# Patient Record
Sex: MALE | URBAN - METROPOLITAN AREA
[De-identification: names, ages, dates, MRNs, and addresses within clinical notes are randomized per-mention and may not be internally consistent; named-entity substitution may affect disease eponyms.]

---

## 2018-12-09 VITALS
OXYGEN SATURATION: 99 % | TEMPERATURE: 98 F | DIASTOLIC BLOOD PRESSURE: 87 MMHG | RESPIRATION RATE: 18 BRPM | SYSTOLIC BLOOD PRESSURE: 143 MMHG | HEART RATE: 96 BPM

## 2018-12-09 LAB
ANION GAP SERPL CALC-SCNC: 6 MMOL/L — LOW (ref 9–16)
APAP SERPL-MCNC: <2 UG/ML — LOW (ref 10–30)
APPEARANCE UR: CLEAR — SIGNIFICANT CHANGE UP
BASOPHILS NFR BLD AUTO: 0.3 % — SIGNIFICANT CHANGE UP (ref 0–2)
BILIRUB UR-MCNC: NEGATIVE — SIGNIFICANT CHANGE UP
BUN SERPL-MCNC: 19 MG/DL — SIGNIFICANT CHANGE UP (ref 7–23)
CALCIUM SERPL-MCNC: 9.5 MG/DL — SIGNIFICANT CHANGE UP (ref 8.5–10.5)
CHLORIDE SERPL-SCNC: 101 MMOL/L — SIGNIFICANT CHANGE UP (ref 96–108)
CO2 SERPL-SCNC: 32 MMOL/L — HIGH (ref 22–31)
COLOR SPEC: YELLOW — SIGNIFICANT CHANGE UP
CREAT SERPL-MCNC: 0.99 MG/DL — SIGNIFICANT CHANGE UP (ref 0.5–1.3)
DIFF PNL FLD: NEGATIVE — SIGNIFICANT CHANGE UP
EOSINOPHIL NFR BLD AUTO: 0.3 % — SIGNIFICANT CHANGE UP (ref 0–6)
ETHANOL SERPL-MCNC: <3 MG/DL — SIGNIFICANT CHANGE UP
GLUCOSE SERPL-MCNC: 110 MG/DL — HIGH (ref 70–99)
GLUCOSE UR QL: NEGATIVE — SIGNIFICANT CHANGE UP
HCT VFR BLD CALC: 40.4 % — SIGNIFICANT CHANGE UP (ref 39–50)
HGB BLD-MCNC: 13.6 G/DL — SIGNIFICANT CHANGE UP (ref 13–17)
IMM GRANULOCYTES NFR BLD AUTO: 0.2 % — SIGNIFICANT CHANGE UP (ref 0–1.5)
KETONES UR-MCNC: NEGATIVE — SIGNIFICANT CHANGE UP
LEUKOCYTE ESTERASE UR-ACNC: NEGATIVE — SIGNIFICANT CHANGE UP
LYMPHOCYTES # BLD AUTO: 20.4 % — SIGNIFICANT CHANGE UP (ref 13–44)
MCHC RBC-ENTMCNC: 27.6 PG — SIGNIFICANT CHANGE UP (ref 27–34)
MCHC RBC-ENTMCNC: 33.7 G/DL — SIGNIFICANT CHANGE UP (ref 32–36)
MCV RBC AUTO: 81.9 FL — SIGNIFICANT CHANGE UP (ref 80–100)
MONOCYTES NFR BLD AUTO: 6.1 % — SIGNIFICANT CHANGE UP (ref 2–14)
NEUTROPHILS NFR BLD AUTO: 72.7 % — SIGNIFICANT CHANGE UP (ref 43–77)
NITRITE UR-MCNC: NEGATIVE — SIGNIFICANT CHANGE UP
PCP SPEC-MCNC: SIGNIFICANT CHANGE UP
PH UR: 6 — SIGNIFICANT CHANGE UP (ref 5–8)
PLATELET # BLD AUTO: 326 K/UL — SIGNIFICANT CHANGE UP (ref 150–400)
POTASSIUM SERPL-MCNC: 3.6 MMOL/L — SIGNIFICANT CHANGE UP (ref 3.5–5.3)
POTASSIUM SERPL-SCNC: 3.6 MMOL/L — SIGNIFICANT CHANGE UP (ref 3.5–5.3)
PROT UR-MCNC: NEGATIVE MG/DL — SIGNIFICANT CHANGE UP
RBC # BLD: 4.93 M/UL — SIGNIFICANT CHANGE UP (ref 4.2–5.8)
RBC # FLD: 13.6 % — SIGNIFICANT CHANGE UP (ref 10.3–14.5)
SALICYLATES SERPL-MCNC: <0.2 MG/DL — LOW (ref 2.8–20)
SODIUM SERPL-SCNC: 139 MMOL/L — SIGNIFICANT CHANGE UP (ref 132–145)
SP GR SPEC: >=1.03 — SIGNIFICANT CHANGE UP (ref 1–1.03)
TSH SERPL-MCNC: 1.51 UIU/ML — SIGNIFICANT CHANGE UP (ref 0.36–3.74)
UROBILINOGEN FLD QL: 0.2 E.U./DL — SIGNIFICANT CHANGE UP
WBC # BLD: 9.6 K/UL — SIGNIFICANT CHANGE UP (ref 3.8–10.5)
WBC # FLD AUTO: 9.6 K/UL — SIGNIFICANT CHANGE UP (ref 3.8–10.5)

## 2018-12-09 PROCEDURE — 90792 PSYCH DIAG EVAL W/MED SRVCS: CPT | Mod: GT

## 2018-12-09 PROCEDURE — 71045 X-RAY EXAM CHEST 1 VIEW: CPT | Mod: 26

## 2018-12-09 NOTE — ED BEHAVIORAL HEALTH ASSESSMENT NOTE - SUBSTANCE ISSUES AND PLAN (INCLUDE STANDING AND PRN MEDICATION)
cannabis use, no other recent use disorders identified, denies recent use of benzodiazepines or alcohol
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
## 2018-12-09 NOTE — ED PROVIDER NOTE - MEDICAL DECISION MAKING DETAILS
Pt with psych history, recently discharged from [place] this morning coming in with persistent SI with plan. VS stable. Will do 1:1 psych work- up and re-evaluate. Pt with psych history, recently discharged from [place] this morning coming in with persistent SI with plan. VS stable. Will do 1:1 psych work- up and re-evaluate. see progress note

## 2018-12-09 NOTE — ED BEHAVIORAL HEALTH ASSESSMENT NOTE - PSYCHIATRIC ISSUES AND PLAN (INCLUDE STANDING AND PRN MEDICATION)
no PRN medications recommended at this time as patient's symptomatic presentation is inconsistent with report

## 2018-12-09 NOTE — ED PROVIDER NOTE - DIAGNOSTIC INTERPRETATION
Interpreted by nori DURAND chest  x-ray, 2  views  Lungs clear, heart shadow normal, bony structures normal, no free air under diaphragm, no PTX
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
## 2018-12-09 NOTE — ED BEHAVIORAL HEALTH ASSESSMENT NOTE - DIFFERENTIAL
adjustment disorder with disturbance of anxiety  cannabis use disorder adjustment disorder with disturbance of anxiety  cannabis use disorder  antisocial pers d/o
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
## 2018-12-09 NOTE — ED PROVIDER NOTE - ATTENDING CONTRIBUTION TO CARE
47 yo M hx of schizophrenia not compliant with medications, s/p recent discharge from inpt psych at [facility] presenting to ED for eval of SI, feels like jumping in front of train.  No drug use.  Denies attempt.  Pt aox3, VSS, looks well, stable mood, nl exam otherwise.  Plan med clearance, psych consult and assess need for inpt care.  Pt is voluntarily here.
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
## 2018-12-09 NOTE — ED BEHAVIORAL HEALTH ASSESSMENT NOTE - DESCRIPTION (FIRST USE, LAST USE, QUANTITY, FREQUENCY, DURATION)
per HPI maintained on suboxone high risk prescribing, likely history of abusing high risk prescribing pattern, stimulants
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
## 2018-12-09 NOTE — ED PROVIDER NOTE - PROGRESS NOTE DETAILS
note: pt. witness by staff/myself  attempting to hang himself with sheets. pt. moved to different room, discussed psych. will admit.

## 2018-12-09 NOTE — ED BEHAVIORAL HEALTH ASSESSMENT NOTE - RISK ASSESSMENT
Patient has several risk factors which are nonmodifiable by inpatient psychiatric admission such as male gender, single relationship status, unemployed, dependence social welfare for support, chronic substance use, and likely underlying anxiety or personality disorders. He presents reporting some psychiatric symptoms such as depressed mood, hallucinations and paranoia which are not consistent with his clinical presentation and arouse suspicion for malingering, given his  complaint that he is 'done with' the shelter system.

## 2018-12-10 ENCOUNTER — INPATIENT (INPATIENT)
Facility: HOSPITAL | Age: 48
LOS: 100 days | Discharge: NURSING, REHAB OR LONG-TERM CARE | DRG: 885 | End: 2019-03-21
Attending: PSYCHIATRY & NEUROLOGY | Admitting: PSYCHIATRY & NEUROLOGY
Payer: MEDICAID

## 2018-12-10 DIAGNOSIS — F13.20 SEDATIVE, HYPNOTIC OR ANXIOLYTIC DEPENDENCE, UNCOMPLICATED: ICD-10-CM

## 2018-12-10 DIAGNOSIS — F25.9 SCHIZOAFFECTIVE DISORDER, UNSPECIFIED: ICD-10-CM

## 2018-12-10 DIAGNOSIS — F32.89 OTHER SPECIFIED DEPRESSIVE EPISODES: ICD-10-CM

## 2018-12-10 DIAGNOSIS — T14.91XA SUICIDE ATTEMPT, INITIAL ENCOUNTER: ICD-10-CM

## 2018-12-10 DIAGNOSIS — F11.229 OPIOID DEPENDENCE WITH INTOXICATION, UNSPECIFIED: ICD-10-CM

## 2018-12-10 DIAGNOSIS — F12.10 CANNABIS ABUSE, UNCOMPLICATED: ICD-10-CM

## 2018-12-10 DIAGNOSIS — F19.10 OTHER PSYCHOACTIVE SUBSTANCE ABUSE, UNCOMPLICATED: ICD-10-CM

## 2018-12-10 DIAGNOSIS — R45.851 SUICIDAL IDEATIONS: ICD-10-CM

## 2018-12-10 DIAGNOSIS — F43.22 ADJUSTMENT DISORDER WITH ANXIETY: ICD-10-CM

## 2018-12-10 DIAGNOSIS — F15.90 OTHER STIMULANT USE, UNSPECIFIED, UNCOMPLICATED: ICD-10-CM

## 2018-12-10 LAB
CHOLEST SERPL-MCNC: 182 MG/DL — SIGNIFICANT CHANGE UP (ref 10–199)
HDLC SERPL-MCNC: 94 MG/DL — SIGNIFICANT CHANGE UP
LIPID PNL WITH DIRECT LDL SERPL: 77 MG/DL — SIGNIFICANT CHANGE UP
TOTAL CHOLESTEROL/HDL RATIO MEASUREMENT: 1.9 RATIO — LOW (ref 3.4–9.6)
TRIGL SERPL-MCNC: 57 MG/DL — SIGNIFICANT CHANGE UP (ref 10–149)

## 2018-12-10 PROCEDURE — 99233 SBSQ HOSP IP/OBS HIGH 50: CPT

## 2018-12-10 PROCEDURE — 71046 X-RAY EXAM CHEST 2 VIEWS: CPT | Mod: 26

## 2018-12-10 PROCEDURE — 99285 EMERGENCY DEPT VISIT HI MDM: CPT | Mod: 25

## 2018-12-10 PROCEDURE — 93010 ELECTROCARDIOGRAM REPORT: CPT

## 2018-12-10 RX ORDER — METHADONE HYDROCHLORIDE 40 MG/1
10 TABLET ORAL DAILY
Qty: 0 | Refills: 0 | Status: DISCONTINUED | OUTPATIENT
Start: 2018-12-10 | End: 2018-12-14

## 2018-12-10 RX ORDER — QUETIAPINE FUMARATE 200 MG/1
100 TABLET, FILM COATED ORAL AT BEDTIME
Qty: 0 | Refills: 0 | Status: DISCONTINUED | OUTPATIENT
Start: 2018-12-10 | End: 2018-12-11

## 2018-12-10 RX ORDER — HYDROXYZINE HCL 10 MG
50 TABLET ORAL AT BEDTIME
Qty: 0 | Refills: 0 | Status: DISCONTINUED | OUTPATIENT
Start: 2018-12-10 | End: 2018-12-13

## 2018-12-10 RX ORDER — DIPHENHYDRAMINE HCL 50 MG
50 CAPSULE ORAL EVERY 6 HOURS
Qty: 0 | Refills: 0 | Status: DISCONTINUED | OUTPATIENT
Start: 2018-12-10 | End: 2019-01-10

## 2018-12-10 RX ORDER — HALOPERIDOL DECANOATE 100 MG/ML
5 INJECTION INTRAMUSCULAR EVERY 6 HOURS
Qty: 0 | Refills: 0 | Status: DISCONTINUED | OUTPATIENT
Start: 2018-12-10 | End: 2019-01-11

## 2018-12-10 RX ADMIN — METHADONE HYDROCHLORIDE 10 MILLIGRAM(S): 40 TABLET ORAL at 15:13

## 2018-12-10 RX ADMIN — Medication 50 MILLIGRAM(S): at 03:37

## 2018-12-10 RX ADMIN — QUETIAPINE FUMARATE 100 MILLIGRAM(S): 200 TABLET, FILM COATED ORAL at 21:37
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
## 2018-12-10 NOTE — BEHAVIORAL HEALTH ASSESSMENT NOTE - NSBHCHARTREVIEWVS_PSY_A_CORE FT
Vital Signs Last 24 Hrs  T(C): 37.4 (10 Dec 2018 09:46), Max: 37.4 (10 Dec 2018 09:46)  T(F): 99.3 (10 Dec 2018 09:46), Max: 99.3 (10 Dec 2018 09:46)  HR: 76 (10 Dec 2018 09:46) (62 - 96)  BP: 144/92 (10 Dec 2018 09:46) (120/76 - 146/90)  BP(mean): --  RR: 20 (10 Dec 2018 09:46) (16 - 20)  SpO2: 100% (10 Dec 2018 01:25) (97% - 100%)

## 2018-12-10 NOTE — BEHAVIORAL HEALTH ASSESSMENT NOTE - RISK ASSESSMENT
Pt is high risk given multiple prior SA and Hx of substance abuse   Static: male gender, single relationship status, unemployed, dependence social welfare for support, substance use, underlying anxiety or personality disorder, Undomiciled, FmHx of substance abuse and SA (sister)   Modifiable: Substance use, non-compliance with meds, SI
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
## 2018-12-10 NOTE — BEHAVIORAL HEALTH ASSESSMENT NOTE - NSBHADMITIPSTRENGTH_PSY_A_CORE
Motivated and ready for change/Cooperative with treatment/In good physical health/Good impulse control/Intelligent/Able to set and pursue goals/Able to exercise self-direction

## 2018-12-10 NOTE — BEHAVIORAL HEALTH ASSESSMENT NOTE - NSBHSUICRISKFACTOR_PSY_A_CORE
Perceived burden on family and others/Global insomnia/History of abuse/trauma/Substance abuse/dependence/Chronic pain or acute medical issue/Hopelessness/Command hallucinations to hurt self/Family history of suicide

## 2018-12-10 NOTE — BEHAVIORAL HEALTH ASSESSMENT NOTE - NSBHVIOLRISKFACTORS_PSY_A_CORE
History of being victimized/traumatized/History of violation of a legal mandate (e.g., parole, probation, AOT)/Substance abuse

## 2018-12-11 PROCEDURE — 99232 SBSQ HOSP IP/OBS MODERATE 35: CPT

## 2018-12-11 RX ORDER — QUETIAPINE FUMARATE 200 MG/1
200 TABLET, FILM COATED ORAL AT BEDTIME
Qty: 0 | Refills: 0 | Status: DISCONTINUED | OUTPATIENT
Start: 2018-12-12 | End: 2018-12-13

## 2018-12-11 RX ADMIN — Medication 50 MILLIGRAM(S): at 21:22

## 2018-12-11 RX ADMIN — METHADONE HYDROCHLORIDE 10 MILLIGRAM(S): 40 TABLET ORAL at 09:38

## 2018-12-11 RX ADMIN — Medication 50 MILLIGRAM(S): at 03:50

## 2018-12-11 RX ADMIN — QUETIAPINE FUMARATE 100 MILLIGRAM(S): 200 TABLET, FILM COATED ORAL at 21:22

## 2018-12-11 RX ADMIN — Medication 50 MILLIGRAM(S): at 15:54

## 2018-12-11 NOTE — PROGRESS NOTE BEHAVIORAL HEALTH - NSBHCHARTREVIEWVS_PSY_A_CORE FT
Vital Signs Last 24 Hrs  T(C): 36.8 (11 Dec 2018 06:24), Max: 37.4 (10 Dec 2018 09:46)  T(F): 98.2 (11 Dec 2018 06:24), Max: 99.3 (10 Dec 2018 09:46)  HR: 58 (11 Dec 2018 06:24) (58 - 76)  BP: 127/84 (11 Dec 2018 06:24) (120/88 - 146/91)  BP(mean): --  RR: 16 (11 Dec 2018 06:24) (16 - 20)  SpO2: --

## 2018-12-11 NOTE — PROGRESS NOTE BEHAVIORAL HEALTH - NSBHFUPINTERVALHXFT_PSY_A_CORE
noted to be somewhat disorganized in groups but no behavioral issues; less anxious   MSE: attends group therapy.  in the dayroom sitting with others; clean.  denies AVH or s/h i/i/p.  speech soft; not spontaneous; unclear at times.   demonstrates normal gait;  no tremor or rigidity;   preoccupied and blocked; mood sad; affect sad and constricted; guarded and evasive; i&j: fair to poor; accepts treatment; however not reflective on sxs or situation.
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
## 2018-12-11 NOTE — PROGRESS NOTE BEHAVIORAL HEALTH - NSBHFUPINTERVALCCFT_PSY_A_CORE
willing  to take more Seroquel and not asking about Suboxone.  No sleep appetite or pain issues.   Noted to be odd in statements by other staff members. wants treatment in the hospital; no complaints of pain.
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
## 2018-12-12 DIAGNOSIS — F41.9 ANXIETY DISORDER, UNSPECIFIED: ICD-10-CM

## 2018-12-12 DIAGNOSIS — F11.20 OPIOID DEPENDENCE, UNCOMPLICATED: ICD-10-CM

## 2018-12-12 DIAGNOSIS — F22 DELUSIONAL DISORDERS: ICD-10-CM

## 2018-12-12 PROCEDURE — 99232 SBSQ HOSP IP/OBS MODERATE 35: CPT

## 2018-12-12 RX ADMIN — Medication 50 MILLIGRAM(S): at 00:04

## 2018-12-12 RX ADMIN — METHADONE HYDROCHLORIDE 10 MILLIGRAM(S): 40 TABLET ORAL at 10:00

## 2018-12-12 RX ADMIN — Medication 50 MILLIGRAM(S): at 09:59

## 2018-12-12 RX ADMIN — QUETIAPINE FUMARATE 200 MILLIGRAM(S): 200 TABLET, FILM COATED ORAL at 21:35

## 2018-12-12 RX ADMIN — Medication 50 MILLIGRAM(S): at 06:44

## 2018-12-12 NOTE — PROGRESS NOTE BEHAVIORAL HEALTH - NSBHFUPINTERVALHXFT_PSY_A_CORE
Pt approached in room and immediately appeared anxious and agitated. Pt also appears very fidgety and unable to sit still. Pt reports he woke up with a mild headache and requesting ibuprofen. Pt reports he called his  to set up a visit upon discharge and is worried that he will be charged with a violation because he has not maintained visits with him. Pt asks "why wont anyone tell me what's going on?", pt is clearly paranoid, also reports he feels that staff and other pts are "talking about me and laughing at me", also questions interviewer's sincerity during interview regarding pt's well-being. Reports feeling anxious and "my nerves are shot", requesting Atarax and reports he does not want Haldol. Pt denies SI, HI, AH, and VH at this time.
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
## 2018-12-12 NOTE — PROGRESS NOTE BEHAVIORAL HEALTH - NSBHCHARTREVIEWVS_PSY_A_CORE FT
Vital Signs Last 24 Hrs  T(C): 36.6 (12 Dec 2018 10:00), Max: 37.1 (12 Dec 2018 06:41)  T(F): 97.8 (12 Dec 2018 10:00), Max: 98.7 (12 Dec 2018 06:41)  HR: 65 (12 Dec 2018 10:00) (61 - 71)  BP: 142/84 (12 Dec 2018 10:00) (129/78 - 146/91)  BP(mean): --  RR: 18 (12 Dec 2018 10:00) (16 - 18)  SpO2: --

## 2018-12-13 DIAGNOSIS — F22 DELUSIONAL DISORDERS: ICD-10-CM

## 2018-12-13 PROCEDURE — 99232 SBSQ HOSP IP/OBS MODERATE 35: CPT

## 2018-12-13 RX ORDER — QUETIAPINE FUMARATE 200 MG/1
300 TABLET, FILM COATED ORAL AT BEDTIME
Qty: 0 | Refills: 0 | Status: DISCONTINUED | OUTPATIENT
Start: 2018-12-13 | End: 2018-12-18

## 2018-12-13 RX ORDER — NICOTINE POLACRILEX 2 MG
2 GUM BUCCAL
Qty: 0 | Refills: 0 | Status: DISCONTINUED | OUTPATIENT
Start: 2018-12-13 | End: 2019-01-14

## 2018-12-13 RX ORDER — HYDROXYZINE HCL 10 MG
50 TABLET ORAL EVERY 6 HOURS
Qty: 0 | Refills: 0 | Status: DISCONTINUED | OUTPATIENT
Start: 2018-12-13 | End: 2019-01-14

## 2018-12-13 RX ADMIN — QUETIAPINE FUMARATE 300 MILLIGRAM(S): 200 TABLET, FILM COATED ORAL at 21:25

## 2018-12-13 RX ADMIN — Medication 2 MILLIGRAM(S): at 12:35

## 2018-12-13 RX ADMIN — Medication 2 MILLIGRAM(S): at 21:25

## 2018-12-13 RX ADMIN — METHADONE HYDROCHLORIDE 10 MILLIGRAM(S): 40 TABLET ORAL at 10:31

## 2018-12-13 RX ADMIN — Medication 2 MILLIGRAM(S): at 18:37

## 2018-12-13 RX ADMIN — Medication 50 MILLIGRAM(S): at 12:35

## 2018-12-13 RX ADMIN — Medication 50 MILLIGRAM(S): at 18:37
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
## 2018-12-13 NOTE — PROGRESS NOTE BEHAVIORAL HEALTH - NSBHFUPINTERVALHXFT_PSY_A_CORE
Pt interviewed in room, Pt states "why do I feel like I can't trust anyone?" and then asks interviewer "are you related to me?". Pt still reports feeling anxious and that the Atarax is helping. Pt also reports that he feels his stay here is "a test to see if I'm violent" and when asked if pt has ever been violent, pt denies. Pt also requesting nicotine gum. Pt expresses interest in rehab and states "maybe when I leave, wherever I end up, maybe they will restart me on my suboxone, but I really would like to get off all medications". Also reports "Maybe I can change my life", and when interviewer expressed encouragement, pt questioned sincerity. Pt even expressed interest in possibility of returning to school to study linguistics. Pt reports "I cant blame anyone else for what's going on and I can't point my finger at anyone else, I have to fix myself". Additionally, pt reports believing in a "higher power" and "God who created everything".

## 2018-12-13 NOTE — PROGRESS NOTE BEHAVIORAL HEALTH - THOUGHT CONTENT
Other/Delusions/Preoccupations/Hopelessness Delusions/Ideas of reference/Hopelessness/Preoccupations/Other

## 2018-12-13 NOTE — PROGRESS NOTE BEHAVIORAL HEALTH - NSBHCHARTREVIEWVS_PSY_A_CORE FT
Vital Signs Last 24 Hrs  T(C): 37.6 (13 Dec 2018 06:06), Max: 37.6 (13 Dec 2018 06:06)  T(F): 99.6 (13 Dec 2018 06:06), Max: 99.6 (13 Dec 2018 06:06)  HR: 88 (13 Dec 2018 06:06) (65 - 88)  BP: 119/81 (13 Dec 2018 06:06) (119/81 - 142/84)  BP(mean): --  RR: 18 (13 Dec 2018 06:06) (18 - 18)  SpO2: --

## 2018-12-14 DIAGNOSIS — F17.210 NICOTINE DEPENDENCE, CIGARETTES, UNCOMPLICATED: ICD-10-CM

## 2018-12-14 PROCEDURE — 99232 SBSQ HOSP IP/OBS MODERATE 35: CPT

## 2018-12-14 RX ORDER — METHADONE HYDROCHLORIDE 40 MG/1
10 TABLET ORAL DAILY
Qty: 0 | Refills: 0 | Status: DISCONTINUED | OUTPATIENT
Start: 2018-12-14 | End: 2018-12-19

## 2018-12-14 RX ADMIN — Medication 50 MILLIGRAM(S): at 18:11

## 2018-12-14 RX ADMIN — Medication 2 MILLIGRAM(S): at 18:11

## 2018-12-14 RX ADMIN — QUETIAPINE FUMARATE 300 MILLIGRAM(S): 200 TABLET, FILM COATED ORAL at 22:09

## 2018-12-14 RX ADMIN — Medication 50 MILLIGRAM(S): at 10:11

## 2018-12-14 RX ADMIN — Medication 2 MILLIGRAM(S): at 12:02

## 2018-12-14 RX ADMIN — METHADONE HYDROCHLORIDE 10 MILLIGRAM(S): 40 TABLET ORAL at 10:09

## 2018-12-14 RX ADMIN — Medication 2 MILLIGRAM(S): at 22:08

## 2018-12-14 RX ADMIN — Medication 2 MILLIGRAM(S): at 10:11

## 2018-12-14 NOTE — PROGRESS NOTE BEHAVIORAL HEALTH - NSBHFUPINTERVALCCFT_PSY_A_CORE
"what is this ruse that's going on?" very paranoid response when asked about plans regarding leaving hospitaol and reports that interested in seeing ;  "I can'T make phone calls!"  "you psychiartis know minds!"  other staff also reports thought disorder and paranoid thinking.  "What about Suboxone"  (hadn't mentioned Suboxone for days and no return  call from clinic which patient provided phone number.  )

## 2018-12-14 NOTE — PROGRESS NOTE BEHAVIORAL HEALTH - NSBHCHARTREVIEWVS_PSY_A_CORE FT
Vital Signs Last 24 Hrs  T(C): 36.9 (14 Dec 2018 06:20), Max: 37.5 (13 Dec 2018 17:00)  T(F): 98.5 (14 Dec 2018 06:20), Max: 99.5 (13 Dec 2018 17:00)  HR: 75 (14 Dec 2018 06:20) (70 - 84)  BP: 123/80 (14 Dec 2018 06:20) (123/80 - 131/87)  BP(mean): --  RR: 18 (14 Dec 2018 06:20) (18 - 18)  SpO2: --
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
## 2018-12-14 NOTE — PROGRESS NOTE BEHAVIORAL HEALTH - NSBHFUPINTERVALHXFT_PSY_A_CORE
Pt interviewed in room. Pt reports he is "hanging in there" and then very quickly transitions into paranoid ideations, pt asks "what is this ruse that's going on here and why can't I get through to my ?". Pt tells interviewer that he remembers him but refused to elaborate how or from where. Pt also states "I don't want any problems" and "I won't act out so you guys can medicate me and kill me and transfer me to another units for medical observation". Pt repetitively responds with "It doesn't matter" when asked to elaborate about ideas of reference and paranoia. Pt also states "I don't know what they are trying to make me out to be" and reports hearing pts and staff making comments about him but refuses to elaborate or specify what he hears them saying. Pt reports he is sleeping well and his appetite is improving, also that nicotine gum is helping. Denies SI,HI,AH,VH at this time. MSE:  anxous spending much of time near the telephone ;  ; clean.  denies AVH or s/h i/i/p.  speech soft; not spontaneous; unclear at times.   demonstrates normal gait;  no tremor or rigidity;   preoccupied and blocked; mood sad; affect sad and constricted; guarded and evasive; i&j: fair to poor; accepts treatment; however not reflective on sxs or situation.

## 2018-12-15 RX ADMIN — Medication 50 MILLIGRAM(S): at 18:37

## 2018-12-15 RX ADMIN — METHADONE HYDROCHLORIDE 10 MILLIGRAM(S): 40 TABLET ORAL at 08:01

## 2018-12-15 RX ADMIN — Medication 2 MILLIGRAM(S): at 21:32

## 2018-12-15 RX ADMIN — Medication 2 MILLIGRAM(S): at 08:02

## 2018-12-15 RX ADMIN — Medication 50 MILLIGRAM(S): at 08:02

## 2018-12-15 RX ADMIN — QUETIAPINE FUMARATE 300 MILLIGRAM(S): 200 TABLET, FILM COATED ORAL at 21:33

## 2018-12-15 RX ADMIN — Medication 2 MILLIGRAM(S): at 10:30

## 2018-12-15 RX ADMIN — Medication 50 MILLIGRAM(S): at 03:45

## 2018-12-15 RX ADMIN — Medication 2 MILLIGRAM(S): at 18:38

## 2018-12-16 RX ADMIN — METHADONE HYDROCHLORIDE 10 MILLIGRAM(S): 40 TABLET ORAL at 09:32

## 2018-12-16 RX ADMIN — Medication 50 MILLIGRAM(S): at 16:01

## 2018-12-16 RX ADMIN — Medication 2 MILLIGRAM(S): at 09:32

## 2018-12-16 RX ADMIN — Medication 50 MILLIGRAM(S): at 08:06

## 2018-12-16 RX ADMIN — Medication 2 MILLIGRAM(S): at 16:01

## 2018-12-16 RX ADMIN — Medication 2 MILLIGRAM(S): at 21:06

## 2018-12-16 RX ADMIN — QUETIAPINE FUMARATE 300 MILLIGRAM(S): 200 TABLET, FILM COATED ORAL at 21:05

## 2018-12-17 PROCEDURE — 90853 GROUP PSYCHOTHERAPY: CPT

## 2018-12-17 PROCEDURE — 99232 SBSQ HOSP IP/OBS MODERATE 35: CPT

## 2018-12-17 RX ORDER — OLANZAPINE 15 MG/1
5 TABLET, FILM COATED ORAL ONCE
Qty: 0 | Refills: 0 | Status: COMPLETED | OUTPATIENT
Start: 2018-12-17 | End: 2018-12-17

## 2018-12-17 RX ADMIN — Medication 2 MILLIGRAM(S): at 10:24

## 2018-12-17 RX ADMIN — METHADONE HYDROCHLORIDE 10 MILLIGRAM(S): 40 TABLET ORAL at 10:21

## 2018-12-17 RX ADMIN — Medication 2 MILLIGRAM(S): at 12:45

## 2018-12-17 RX ADMIN — Medication 2 MILLIGRAM(S): at 22:10

## 2018-12-17 RX ADMIN — Medication 50 MILLIGRAM(S): at 12:44

## 2018-12-17 RX ADMIN — QUETIAPINE FUMARATE 300 MILLIGRAM(S): 200 TABLET, FILM COATED ORAL at 22:10

## 2018-12-17 RX ADMIN — Medication 50 MILLIGRAM(S): at 10:23

## 2018-12-17 NOTE — PROGRESS NOTE BEHAVIORAL HEALTH - NSBHFUPINTERVALCCFT_PSY_A_CORE
irene very paranoid  "What's wrong with you?"   wants to go to rehab.  Not able to tolerate structured interview.
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
## 2018-12-17 NOTE — CHART NOTE - NSCHARTNOTEFT_GEN_A_CORE
Group Name: Self-Other Group	  DOCUMENTATION OF PARTICIPATION AND RESPONSE OF INDIVIDUAL TO GROUP TREATMENT  Behavior in Group (check all that apply):  ? Showed insight             ? Active in discussion            ? Offered constructive input         ? No apparent interest  X Showed interest           X Quietly engaged                ? Supportive to others                  ? Appeared distracted  ? Showed leadership        Withdrawn                       ?Not supportive to others            ? Disruptive  Individual’s Mood:    ?Stable     XDepressed/Sad     ?Anxious     ?Angry    XOther Paranoid aboutbeing poisoned  Risk Assessment  XNone Reported or Observed   ? Danger to Self:  ? Ideation ? Plan  ? Intent  ? Attempt       ? Danger to Others:  ? Ideation ? Plan  ? Intent  ? Attempt   Describe in detail (cont. below as needed):      Goal(s)/Objective(s) Addressed  Positive and negative aspects of relationships    Intervention(s) / Method(s) Provided:   Self and Others Group  Relationship worksheet    Response to Intervention(s) and Progress Toward Goals and Objectives: PT engaged and discussed positive and negative aspects of relationships    Plan/Additional Information (any recommendations or determinations for continued or revised treatment): Continue attending group   Completed By - Print Staff Name/Credentials:  Lev Atwood, PhD / Staff Psychologist	  CPT Code 96577 	  Start Time 10:45	  Stop Time 11:30	  Duration in Minutes 45
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
## 2018-12-17 NOTE — PROGRESS NOTE BEHAVIORAL HEALTH - NSBHCHARTREVIEWVS_PSY_A_CORE FT
Vital Signs Last 24 Hrs  T(C): 37.1 (16 Dec 2018 17:00), Max: 37.2 (16 Dec 2018 09:39)  T(F): 98.7 (16 Dec 2018 17:00), Max: 99 (16 Dec 2018 09:39)  HR: 84 (16 Dec 2018 17:00) (80 - 84)  BP: 128/78 (16 Dec 2018 17:00) (116/79 - 128/78)  BP(mean): --  RR: 18 (16 Dec 2018 17:00) (18 - 20)  SpO2: --

## 2018-12-18 DIAGNOSIS — F20.0 PARANOID SCHIZOPHRENIA: ICD-10-CM

## 2018-12-18 PROCEDURE — 99232 SBSQ HOSP IP/OBS MODERATE 35: CPT

## 2018-12-18 RX ORDER — CLONAZEPAM 1 MG
1 TABLET ORAL EVERY 6 HOURS
Qty: 0 | Refills: 0 | Status: DISCONTINUED | OUTPATIENT
Start: 2018-12-18 | End: 2018-12-25

## 2018-12-18 RX ORDER — OLANZAPINE 15 MG/1
5 TABLET, FILM COATED ORAL
Qty: 0 | Refills: 0 | Status: DISCONTINUED | OUTPATIENT
Start: 2018-12-18 | End: 2018-12-21

## 2018-12-18 RX ORDER — QUETIAPINE FUMARATE 200 MG/1
100 TABLET, FILM COATED ORAL AT BEDTIME
Qty: 0 | Refills: 0 | Status: DISCONTINUED | OUTPATIENT
Start: 2018-12-18 | End: 2018-12-24

## 2018-12-18 RX ADMIN — OLANZAPINE 5 MILLIGRAM(S): 15 TABLET, FILM COATED ORAL at 21:36

## 2018-12-18 RX ADMIN — METHADONE HYDROCHLORIDE 10 MILLIGRAM(S): 40 TABLET ORAL at 09:41

## 2018-12-18 RX ADMIN — Medication 50 MILLIGRAM(S): at 09:41

## 2018-12-18 RX ADMIN — Medication 1 MILLIGRAM(S): at 13:26

## 2018-12-18 RX ADMIN — QUETIAPINE FUMARATE 100 MILLIGRAM(S): 200 TABLET, FILM COATED ORAL at 21:36

## 2018-12-18 RX ADMIN — Medication 50 MILLIGRAM(S): at 11:02

## 2018-12-18 RX ADMIN — Medication 2 MILLIGRAM(S): at 09:40

## 2018-12-18 RX ADMIN — Medication 2 MILLIGRAM(S): at 21:37
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
## 2018-12-18 NOTE — PROGRESS NOTE BEHAVIORAL HEALTH - THOUGHT CONTENT
Delusions/Other/Ideas of reference/Preoccupations/Hopelessness Delusions/Other/Ideas of reference/Preoccupations/Suicidality/Hopelessness

## 2018-12-18 NOTE — PROGRESS NOTE BEHAVIORAL HEALTH - OTHER
fidgety, lays down, sits up, stands up and then lays down again paranoid disorganized Aggressive paranoid, aggressive

## 2018-12-18 NOTE — PROGRESS NOTE BEHAVIORAL HEALTH - NSBHFUPINTERVALHXFT_PSY_A_CORE
Pt approached for interview in room. Reports he is not doing well. Pt appears severely paranoid and psychotic. Pt states "why is pressure being put on my family?, this is not a joke, I can tell by their voices it seems like they are being choked". Pt also reports he believes his phone calls are "being manipulated and recorded". Pt then states "why is the lady who works for my aunt here?" but refused to elaborate. When asked regarding SI pt replied "you think I don't have the balls to do it?" and then gestured in the bathroom and stated "I could just put this around my neck" and walked back to bed. Reports current meds are making his arm feel like it's burning.

## 2018-12-18 NOTE — PROGRESS NOTE BEHAVIORAL HEALTH - NSBHCHARTREVIEWVS_PSY_A_CORE FT
Vital Signs Last 24 Hrs  T(C): 36.6 (17 Dec 2018 17:00), Max: 37.4 (17 Dec 2018 09:27)  T(F): 97.9 (17 Dec 2018 17:00), Max: 99.3 (17 Dec 2018 09:27)  HR: 64 (17 Dec 2018 17:00) (64 - 84)  BP: 111/77 (17 Dec 2018 17:00) (111/77 - 115/81)  BP(mean): --  RR: 17 (17 Dec 2018 17:00) (17 - 20)  SpO2: --

## 2018-12-19 DIAGNOSIS — F60.2 ANTISOCIAL PERSONALITY DISORDER: ICD-10-CM

## 2018-12-19 PROCEDURE — 99232 SBSQ HOSP IP/OBS MODERATE 35: CPT

## 2018-12-19 RX ORDER — METHADONE HYDROCHLORIDE 40 MG/1
10 TABLET ORAL DAILY
Qty: 0 | Refills: 0 | Status: DISCONTINUED | OUTPATIENT
Start: 2018-12-19 | End: 2018-12-25

## 2018-12-19 RX ADMIN — Medication 50 MILLIGRAM(S): at 21:27

## 2018-12-19 RX ADMIN — Medication 2 MILLIGRAM(S): at 13:09

## 2018-12-19 RX ADMIN — Medication 2 MILLIGRAM(S): at 21:27

## 2018-12-19 RX ADMIN — METHADONE HYDROCHLORIDE 10 MILLIGRAM(S): 40 TABLET ORAL at 11:15

## 2018-12-19 RX ADMIN — OLANZAPINE 5 MILLIGRAM(S): 15 TABLET, FILM COATED ORAL at 21:26

## 2018-12-19 RX ADMIN — QUETIAPINE FUMARATE 100 MILLIGRAM(S): 200 TABLET, FILM COATED ORAL at 21:26

## 2018-12-19 RX ADMIN — OLANZAPINE 5 MILLIGRAM(S): 15 TABLET, FILM COATED ORAL at 11:13

## 2018-12-19 RX ADMIN — Medication 50 MILLIGRAM(S): at 13:09

## 2018-12-19 NOTE — PROGRESS NOTE BEHAVIORAL HEALTH - PROBLEM SELECTOR PLAN 1
Olanzapine 5mg BID  Quetiapine 100 HS  Klonopin 1mg PRN Q6 hours  Diphenhydramine 50mg PRN Q6 hours  Atarax 50mg PRN Q6 hours

## 2018-12-19 NOTE — PROGRESS NOTE BEHAVIORAL HEALTH - NSBHFUPINTERVALHXFT_PSY_A_CORE
Pt approached while in bed, under constant observation since making threat of suicide yesterday with plan and intent. Pt refuses to communicate much but appears less aggressive than previous encounter. Pt still endorses paranoid ideation, reports he believes that his phone calls are being monitored and manipulated. Also reports he cannot get in contact with his . Requesting to being transferred to "[facility]". Pt approached while in bed, under constant observation since making threat of suicide yesterday with plan and intent. Pt refuses to communicate much but appears less aggressive than previous encounter. Pt still endorses paranoid ideation, reports he believes that his phone calls are being monitored and manipulated. Also reports he cannot get in contact with his . Requesting to being transferred to "[facility]". Pt later observed sitting in glass next to phone and appears visibly agitated, when asked why, pt responded that his phone calls are not going through and refused to elaborate.
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
## 2018-12-19 NOTE — PROGRESS NOTE BEHAVIORAL HEALTH - NSBHCHARTREVIEWVS_PSY_A_CORE FT
Vital Signs Last 24 Hrs  T(C): 36.8 (18 Dec 2018 16:16), Max: 37.2 (18 Dec 2018 10:00)  T(F): 98.2 (18 Dec 2018 16:16), Max: 98.9 (18 Dec 2018 10:00)  HR: 71 (18 Dec 2018 16:16) (71 - 82)  BP: 128/80 (18 Dec 2018 16:16) (120/78 - 128/80)  BP(mean): --  RR: 20 (18 Dec 2018 16:16) (20 - 20)  SpO2: --

## 2018-12-19 NOTE — PROGRESS NOTE BEHAVIORAL HEALTH - PRN MEDS
Atarax 50mg for anxiety  Diphenhydramine 50mg for agitation  Klonopin 1mg for panic/anxiety  Nicotine Gum 2mg for nicotine dependence

## 2018-12-20 PROCEDURE — 99232 SBSQ HOSP IP/OBS MODERATE 35: CPT

## 2018-12-20 RX ADMIN — OLANZAPINE 5 MILLIGRAM(S): 15 TABLET, FILM COATED ORAL at 21:49

## 2018-12-20 RX ADMIN — OLANZAPINE 5 MILLIGRAM(S): 15 TABLET, FILM COATED ORAL at 10:59

## 2018-12-20 RX ADMIN — Medication 2 MILLIGRAM(S): at 11:02

## 2018-12-20 RX ADMIN — Medication 2 MILLIGRAM(S): at 21:49

## 2018-12-20 RX ADMIN — Medication 50 MILLIGRAM(S): at 21:49

## 2018-12-20 RX ADMIN — QUETIAPINE FUMARATE 100 MILLIGRAM(S): 200 TABLET, FILM COATED ORAL at 21:49

## 2018-12-20 RX ADMIN — METHADONE HYDROCHLORIDE 10 MILLIGRAM(S): 40 TABLET ORAL at 10:59

## 2018-12-20 RX ADMIN — Medication 1 MILLIGRAM(S): at 22:35
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
## 2018-12-20 NOTE — PROGRESS NOTE BEHAVIORAL HEALTH - NSBHFUPINTERVALHXFT_PSY_A_CORE
Pt approached in room, under constant observation, observed laying down in bed, awake. Pt reports feeling depressed, slept approximately 4-5 hours and appetite is ok. Pt also appears anxious and irritable, but much less aggressive and agitated than the previous 2 days. Pt continues to complain that he feels his arms are on fire. Pt also requesting transfer to [facility]. Pt still reporting paranoid ideation and reports it has not improved yet.

## 2018-12-20 NOTE — PROGRESS NOTE BEHAVIORAL HEALTH - PROBLEM SELECTOR PLAN 2
Zyprexa 5mg BID  Seroquel 100mg HS  Benadryl 50mg PRN Q6 hours  Klonopin 1mg PRN Q6 hours  Haldol 5mg PRN Q6 hours  Atarax 50mg PRN Q6 hours

## 2018-12-20 NOTE — PROGRESS NOTE BEHAVIORAL HEALTH - NSBHCHARTREVIEWVS_PSY_A_CORE FT
Vital Signs Last 24 Hrs  T(C): 36.9 (20 Dec 2018 08:40), Max: 36.9 (20 Dec 2018 08:40)  T(F): 98.4 (20 Dec 2018 08:40), Max: 98.4 (20 Dec 2018 08:40)  HR: 81 (20 Dec 2018 08:40) (69 - 81)  BP: 116/75 (20 Dec 2018 08:40) (112/74 - 116/75)  BP(mean): --  RR: 20 (20 Dec 2018 08:40) (17 - 20)  SpO2: --

## 2018-12-20 NOTE — PROGRESS NOTE BEHAVIORAL HEALTH - NSBHFUPINTERVALCCFT_PSY_A_CORE
"I'm not doing too well" "I'm not doing too well";  however told the writer that he wants to go to long term rehab.  Sleep is ok.  His arms feel "funny";  Still has SI but appears less intense;  still suspicious and makes challenging statements "What do you mean ... ?" but less so.

## 2018-12-21 PROCEDURE — 99232 SBSQ HOSP IP/OBS MODERATE 35: CPT

## 2018-12-21 RX ORDER — OLANZAPINE 15 MG/1
10 TABLET, FILM COATED ORAL AT BEDTIME
Qty: 0 | Refills: 0 | Status: DISCONTINUED | OUTPATIENT
Start: 2018-12-21 | End: 2018-12-26

## 2018-12-21 RX ORDER — OLANZAPINE 15 MG/1
5 TABLET, FILM COATED ORAL DAILY
Qty: 0 | Refills: 0 | Status: DISCONTINUED | OUTPATIENT
Start: 2018-12-21 | End: 2018-12-24

## 2018-12-21 RX ADMIN — Medication 2 MILLIGRAM(S): at 12:18

## 2018-12-21 RX ADMIN — Medication 2 MILLIGRAM(S): at 10:40

## 2018-12-21 RX ADMIN — HALOPERIDOL DECANOATE 5 MILLIGRAM(S): 100 INJECTION INTRAMUSCULAR at 19:59

## 2018-12-21 RX ADMIN — OLANZAPINE 10 MILLIGRAM(S): 15 TABLET, FILM COATED ORAL at 21:23

## 2018-12-21 RX ADMIN — Medication 50 MILLIGRAM(S): at 19:59

## 2018-12-21 RX ADMIN — Medication 50 MILLIGRAM(S): at 11:21

## 2018-12-21 RX ADMIN — METHADONE HYDROCHLORIDE 10 MILLIGRAM(S): 40 TABLET ORAL at 10:38

## 2018-12-21 RX ADMIN — Medication 1 MILLIGRAM(S): at 12:16

## 2018-12-21 RX ADMIN — Medication 2 MILLIGRAM(S): at 21:23

## 2018-12-21 RX ADMIN — QUETIAPINE FUMARATE 100 MILLIGRAM(S): 200 TABLET, FILM COATED ORAL at 21:22

## 2018-12-21 RX ADMIN — OLANZAPINE 5 MILLIGRAM(S): 15 TABLET, FILM COATED ORAL at 10:38

## 2018-12-21 NOTE — PROGRESS NOTE BEHAVIORAL HEALTH - NSBHCHARTREVIEWVS_PSY_A_CORE FT
Vital Signs Last 24 Hrs  T(C): 37.2 (20 Dec 2018 16:29), Max: 37.2 (20 Dec 2018 16:29)  T(F): 98.9 (20 Dec 2018 16:29), Max: 98.9 (20 Dec 2018 16:29)  HR: 66 (20 Dec 2018 16:29) (66 - 81)  BP: 117/75 (20 Dec 2018 16:29) (116/75 - 117/75)  BP(mean): --  RR: 18 (20 Dec 2018 16:29) (18 - 20)  SpO2: --
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
## 2018-12-21 NOTE — PROGRESS NOTE BEHAVIORAL HEALTH - PROBLEM SELECTOR PLAN 2
Zyprexa 5mg BID  Seroquel 100mg HS  Benadryl 50mg PRN Q6 hours  Klonopin 1mg PRN Q6 hours  Haldol 5mg PRN Q6 hours  Atarax 50mg PRN Q6 hours.

## 2018-12-21 NOTE — PROGRESS NOTE BEHAVIORAL HEALTH - NSBHFUPINTERVALCCFT_PSY_A_CORE
"Not feeling too good" "Why are you afraid of me?"  "You are reading my mind"   Sleep is ok.  Not responsive to other questions.

## 2018-12-22 LAB — HIV 1+2 AB+HIV1 P24 AG SERPL QL IA: SIGNIFICANT CHANGE UP

## 2018-12-22 RX ADMIN — Medication 50 MILLIGRAM(S): at 10:06

## 2018-12-22 RX ADMIN — OLANZAPINE 5 MILLIGRAM(S): 15 TABLET, FILM COATED ORAL at 09:40

## 2018-12-22 RX ADMIN — Medication 2 MILLIGRAM(S): at 21:54

## 2018-12-22 RX ADMIN — Medication 50 MILLIGRAM(S): at 21:54

## 2018-12-22 RX ADMIN — HALOPERIDOL DECANOATE 5 MILLIGRAM(S): 100 INJECTION INTRAMUSCULAR at 13:08

## 2018-12-22 RX ADMIN — Medication 2 MILLIGRAM(S): at 11:22

## 2018-12-22 RX ADMIN — OLANZAPINE 10 MILLIGRAM(S): 15 TABLET, FILM COATED ORAL at 21:53

## 2018-12-22 RX ADMIN — QUETIAPINE FUMARATE 100 MILLIGRAM(S): 200 TABLET, FILM COATED ORAL at 21:53

## 2018-12-22 RX ADMIN — Medication 1 MILLIGRAM(S): at 11:21

## 2018-12-22 RX ADMIN — Medication 50 MILLIGRAM(S): at 13:08

## 2018-12-22 RX ADMIN — METHADONE HYDROCHLORIDE 10 MILLIGRAM(S): 40 TABLET ORAL at 09:40

## 2018-12-23 RX ADMIN — Medication 2 MILLIGRAM(S): at 16:15

## 2018-12-23 RX ADMIN — Medication 2 MILLIGRAM(S): at 11:31

## 2018-12-23 RX ADMIN — Medication 50 MILLIGRAM(S): at 11:29

## 2018-12-23 RX ADMIN — OLANZAPINE 5 MILLIGRAM(S): 15 TABLET, FILM COATED ORAL at 10:10

## 2018-12-23 RX ADMIN — OLANZAPINE 10 MILLIGRAM(S): 15 TABLET, FILM COATED ORAL at 21:36

## 2018-12-23 RX ADMIN — Medication 50 MILLIGRAM(S): at 16:14

## 2018-12-23 RX ADMIN — Medication 50 MILLIGRAM(S): at 17:29

## 2018-12-23 RX ADMIN — HALOPERIDOL DECANOATE 5 MILLIGRAM(S): 100 INJECTION INTRAMUSCULAR at 22:18

## 2018-12-23 RX ADMIN — Medication 1 MILLIGRAM(S): at 21:36

## 2018-12-23 RX ADMIN — QUETIAPINE FUMARATE 100 MILLIGRAM(S): 200 TABLET, FILM COATED ORAL at 21:36

## 2018-12-23 RX ADMIN — METHADONE HYDROCHLORIDE 10 MILLIGRAM(S): 40 TABLET ORAL at 10:10

## 2018-12-23 RX ADMIN — HALOPERIDOL DECANOATE 5 MILLIGRAM(S): 100 INJECTION INTRAMUSCULAR at 11:29

## 2018-12-24 PROCEDURE — 99232 SBSQ HOSP IP/OBS MODERATE 35: CPT

## 2018-12-24 RX ORDER — OLANZAPINE 15 MG/1
10 TABLET, FILM COATED ORAL DAILY
Qty: 0 | Refills: 0 | Status: DISCONTINUED | OUTPATIENT
Start: 2018-12-24 | End: 2018-12-27

## 2018-12-24 RX ORDER — QUETIAPINE FUMARATE 200 MG/1
50 TABLET, FILM COATED ORAL AT BEDTIME
Qty: 0 | Refills: 0 | Status: DISCONTINUED | OUTPATIENT
Start: 2018-12-24 | End: 2019-01-04

## 2018-12-24 RX ADMIN — OLANZAPINE 5 MILLIGRAM(S): 15 TABLET, FILM COATED ORAL at 10:24

## 2018-12-24 RX ADMIN — Medication 2 MILLIGRAM(S): at 21:15

## 2018-12-24 RX ADMIN — QUETIAPINE FUMARATE 50 MILLIGRAM(S): 200 TABLET, FILM COATED ORAL at 21:15

## 2018-12-24 RX ADMIN — HALOPERIDOL DECANOATE 5 MILLIGRAM(S): 100 INJECTION INTRAMUSCULAR at 17:37

## 2018-12-24 RX ADMIN — METHADONE HYDROCHLORIDE 10 MILLIGRAM(S): 40 TABLET ORAL at 10:24

## 2018-12-24 RX ADMIN — Medication 50 MILLIGRAM(S): at 17:37

## 2018-12-24 RX ADMIN — Medication 1 MILLIGRAM(S): at 21:15

## 2018-12-24 RX ADMIN — OLANZAPINE 10 MILLIGRAM(S): 15 TABLET, FILM COATED ORAL at 21:15

## 2018-12-24 RX ADMIN — Medication 2 MILLIGRAM(S): at 17:37

## 2018-12-24 NOTE — PROGRESS NOTE BEHAVIORAL HEALTH - NSBHFUPINTERVALHXFT_PSY_A_CORE
Pt approached and interviewed in room, pt still under constant observation for continuing active SI and reports "of course I would act on it". Pt also reports paranoid thinking and reports it is always there. Reports sleeping 5-6 hours last night and feeling depressed over the weekend. Also reports thinking about "after care" and brings up the possibility of attending rehab. Appetite and sleep continually improving. Reports hearing a voice in his head and when asked whose voice it is, pt responded "it must be mine, who else's voice could it be?". Pt also supports that meds are helping for the first time since admission. Pt denies HI and VH at this time. Pt observed on unit, mostly isolated, attends some groups and compliant with meds. Per staff, pt is compliant with meds, pacing, coopearaitve, paranoid, anxious, received haldol/benadryl as prns.  Pt approached and interviewed in room, pt still under constant observation for continuing active SI and reports "of course I would act on it". Pt also reports paranoid thinking and reports it is always there. Reports sleeping 5-6 hours last night and feeling depressed over the weekend. Also reports thinking about "after care" and brings up the possibility of attending rehab. Appetite and sleep continually improving. Reports hearing a voice in his head and when asked whose voice it is, pt responded "it must be mine, who else's voice could it be?". Pt also supports that meds are helping for the first time since admission. Pt denies HI and VH at this time. +paranoia about being followed, phone calls monitored, and cameras on the unit.  Pt observed on unit, mostly isolated, attends some groups and compliant with meds. uninterested in changing meds at this time.

## 2018-12-24 NOTE — PROGRESS NOTE BEHAVIORAL HEALTH - PRN MEDS
Klonopin 1mg x 2 doses  Benadryl 50mg x 3 doses  Haldol 5mg x 3 doses  Atarax 50mg x 3 doses  Nicotine Gum 2 mg haldol, benadryl, atarax

## 2018-12-24 NOTE — PROGRESS NOTE BEHAVIORAL HEALTH - PROBLEM SELECTOR PLAN 2
Zyprexa 5mg BID  *considering changing to haldol instead of zyprexa  Seroquel 100mg HS  Benadryl 50mg PRN Q6 hours  Klonopin 1mg PRN Q6 hours  Haldol 5mg PRN Q6 hours  Atarax 50mg PRN Q6 hours.

## 2018-12-24 NOTE — PROGRESS NOTE BEHAVIORAL HEALTH - NSBHCHARTREVIEWVS_PSY_A_CORE FT
Vital Signs Last 24 Hrs  T(C): 36.9 (24 Dec 2018 10:21), Max: 36.9 (24 Dec 2018 10:21)  T(F): 98.4 (24 Dec 2018 10:21), Max: 98.4 (24 Dec 2018 10:21)  HR: 83 (24 Dec 2018 10:21) (83 - 83)  BP: 123/84 (24 Dec 2018 10:21) (123/84 - 125/80)  BP(mean): --  RR: 18 (23 Dec 2018 16:27) (18 - 18)  SpO2: --
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
## 2018-12-24 NOTE — PROGRESS NOTE BEHAVIORAL HEALTH - PROBLEM SELECTOR PLAN 1
Zyprexa 5mg BID  *considering changing to haldol instead of zyprexa  Seroquel 100mg HS  Benadryl 50mg PRN Q6 hours  Klonopin 1mg PRN Q6 hours  Haldol 5mg PRN Q6 hours  Atarax 50mg PRN Q6 hours. will increase zyprexa to 10mg bid to address continued psychosis.   decrease to 50mg HS with plan to taper off.  Benadryl 50mg PRN Q6 hours  Klonopin 1mg PRN Q6 hours  Haldol 5mg PRN Q6 hours  Atarax 50mg PRN Q6 hours.

## 2018-12-25 RX ORDER — CLONAZEPAM 1 MG
1 TABLET ORAL EVERY 6 HOURS
Qty: 0 | Refills: 0 | Status: DISCONTINUED | OUTPATIENT
Start: 2018-12-25 | End: 2018-12-31

## 2018-12-25 RX ORDER — METHADONE HYDROCHLORIDE 40 MG/1
10 TABLET ORAL DAILY
Qty: 0 | Refills: 0 | Status: DISCONTINUED | OUTPATIENT
Start: 2018-12-25 | End: 2018-12-31

## 2018-12-25 RX ORDER — BACITRACIN ZINC 500 UNIT/G
1 OINTMENT IN PACKET (EA) TOPICAL
Qty: 0 | Refills: 0 | Status: DISCONTINUED | OUTPATIENT
Start: 2018-12-25 | End: 2019-01-02

## 2018-12-25 RX ADMIN — Medication 50 MILLIGRAM(S): at 00:59

## 2018-12-25 RX ADMIN — Medication 1 MILLIGRAM(S): at 12:00

## 2018-12-25 RX ADMIN — HALOPERIDOL DECANOATE 5 MILLIGRAM(S): 100 INJECTION INTRAMUSCULAR at 19:32

## 2018-12-25 RX ADMIN — QUETIAPINE FUMARATE 50 MILLIGRAM(S): 200 TABLET, FILM COATED ORAL at 22:22

## 2018-12-25 RX ADMIN — Medication 1 APPLICATION(S): at 22:22

## 2018-12-25 RX ADMIN — OLANZAPINE 10 MILLIGRAM(S): 15 TABLET, FILM COATED ORAL at 09:37

## 2018-12-25 RX ADMIN — HALOPERIDOL DECANOATE 5 MILLIGRAM(S): 100 INJECTION INTRAMUSCULAR at 00:59

## 2018-12-25 RX ADMIN — OLANZAPINE 10 MILLIGRAM(S): 15 TABLET, FILM COATED ORAL at 22:22

## 2018-12-25 RX ADMIN — Medication 50 MILLIGRAM(S): at 19:32

## 2018-12-25 RX ADMIN — METHADONE HYDROCHLORIDE 10 MILLIGRAM(S): 40 TABLET ORAL at 09:37

## 2018-12-25 RX ADMIN — Medication 1 MILLIGRAM(S): at 22:23
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
## 2018-12-26 PROCEDURE — 99232 SBSQ HOSP IP/OBS MODERATE 35: CPT

## 2018-12-26 RX ORDER — OLANZAPINE 15 MG/1
15 TABLET, FILM COATED ORAL AT BEDTIME
Qty: 0 | Refills: 0 | Status: DISCONTINUED | OUTPATIENT
Start: 2018-12-26 | End: 2019-01-25

## 2018-12-26 RX ADMIN — Medication 1 APPLICATION(S): at 10:17

## 2018-12-26 RX ADMIN — Medication 2 MILLIGRAM(S): at 10:10

## 2018-12-26 RX ADMIN — Medication 1 MILLIGRAM(S): at 21:46

## 2018-12-26 RX ADMIN — METHADONE HYDROCHLORIDE 10 MILLIGRAM(S): 40 TABLET ORAL at 10:10

## 2018-12-26 RX ADMIN — Medication 50 MILLIGRAM(S): at 10:09

## 2018-12-26 RX ADMIN — Medication 2 MILLIGRAM(S): at 21:46

## 2018-12-26 RX ADMIN — HALOPERIDOL DECANOATE 5 MILLIGRAM(S): 100 INJECTION INTRAMUSCULAR at 19:34

## 2018-12-26 RX ADMIN — OLANZAPINE 10 MILLIGRAM(S): 15 TABLET, FILM COATED ORAL at 10:10

## 2018-12-26 RX ADMIN — Medication 50 MILLIGRAM(S): at 19:34

## 2018-12-26 RX ADMIN — QUETIAPINE FUMARATE 50 MILLIGRAM(S): 200 TABLET, FILM COATED ORAL at 21:45

## 2018-12-26 RX ADMIN — OLANZAPINE 15 MILLIGRAM(S): 15 TABLET, FILM COATED ORAL at 21:45

## 2018-12-26 NOTE — PROGRESS NOTE BEHAVIORAL HEALTH - PROBLEM SELECTOR PLAN 2
will increase zyprexa to 10mg QD and 15 QHS to address continued psychosis.   Seroquel 50mg HS  *considering changing to haldol instead of zyprexa  Benadryl 50mg PRN Q6 hours  Klonopin 1mg PRN Q6 hours  Haldol 5mg PRN Q6 hours  Atarax 50mg PRN Q6 hours. will increase zyprexa to 10mg QDaily and 15 QHS to address continued psychosis.   Seroquel 50mg HS  *considering changing to haldol instead of zyprexa  Benadryl 50mg PRN Q6 hours  Klonopin 1mg PRN Q6 hours  Haldol 5mg PRN Q6 hours  Atarax 50mg PRN Q6 hours.

## 2018-12-26 NOTE — PROGRESS NOTE BEHAVIORAL HEALTH - NSBHCHARTREVIEWVS_PSY_A_CORE FT
Vital Signs Last 24 Hrs  T(C): 37 (25 Dec 2018 17:00), Max: 37 (25 Dec 2018 17:00)  T(F): 98.6 (25 Dec 2018 17:00), Max: 98.6 (25 Dec 2018 17:00)  HR: 78 (25 Dec 2018 17:00) (78 - 78)  BP: 109/72 (26 Dec 2018 09:31) (106/66 - 109/72)  BP(mean): --  RR: 17 (25 Dec 2018 17:00) (17 - 17)  SpO2: -- Vital Signs Last 24 Hrs  T(C): 37.1 (26 Dec 2018 16:18), Max: 37.1 (26 Dec 2018 16:18)  T(F): 98.7 (26 Dec 2018 16:18), Max: 98.7 (26 Dec 2018 16:18)  HR: 85 (26 Dec 2018 16:18) (85 - 85)  BP: 114/73 (26 Dec 2018 16:18) (109/72 - 114/73)  BP(mean): --  RR: 18 (26 Dec 2018 16:18) (18 - 18)  SpO2: --
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
## 2018-12-26 NOTE — PROGRESS NOTE BEHAVIORAL HEALTH - PROBLEM SELECTOR PLAN 1
will increase zyprexa to 10mg QD and 15 QHS to address continued psychosis.   Seroquel 50mg HS  Benadryl 50mg PRN Q6 hours  Klonopin 1mg PRN Q6 hours  Haldol 5mg PRN Q6 hours  Atarax 50mg PRN Q6 hours. continue 1:1 observation at this juliana.e  will increase zyprexa to 10mg QDaily and 15 QHS to address continued psychosis.   Seroquel 50mg HS  Benadryl 50mg PRN Q6 hours  Klonopin 1mg PRN Q6 hours  Haldol 5mg PRN Q6 hours  Atarax 50mg PRN Q6 hours.

## 2018-12-27 PROCEDURE — 99233 SBSQ HOSP IP/OBS HIGH 50: CPT

## 2018-12-27 RX ORDER — OLANZAPINE 15 MG/1
15 TABLET, FILM COATED ORAL DAILY
Qty: 0 | Refills: 0 | Status: DISCONTINUED | OUTPATIENT
Start: 2018-12-27 | End: 2019-01-25

## 2018-12-27 RX ADMIN — HALOPERIDOL DECANOATE 5 MILLIGRAM(S): 100 INJECTION INTRAMUSCULAR at 17:06

## 2018-12-27 RX ADMIN — Medication 1 APPLICATION(S): at 11:08

## 2018-12-27 RX ADMIN — OLANZAPINE 15 MILLIGRAM(S): 15 TABLET, FILM COATED ORAL at 21:37

## 2018-12-27 RX ADMIN — QUETIAPINE FUMARATE 50 MILLIGRAM(S): 200 TABLET, FILM COATED ORAL at 21:38

## 2018-12-27 RX ADMIN — Medication 2 MILLIGRAM(S): at 17:06

## 2018-12-27 RX ADMIN — Medication 1 APPLICATION(S): at 21:38

## 2018-12-27 RX ADMIN — METHADONE HYDROCHLORIDE 10 MILLIGRAM(S): 40 TABLET ORAL at 11:08

## 2018-12-27 RX ADMIN — Medication 50 MILLIGRAM(S): at 17:06

## 2018-12-27 RX ADMIN — OLANZAPINE 10 MILLIGRAM(S): 15 TABLET, FILM COATED ORAL at 11:07
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
## 2018-12-27 NOTE — PROGRESS NOTE BEHAVIORAL HEALTH - PROBLEM SELECTOR PLAN 2
will increase zyprexa to 15mg BID to address continued psychosis.   Seroquel 50mg HS  *considering changing to haldol instead of zyprexa  Benadryl 50mg PRN Q6 hours  Klonopin 1mg PRN Q6 hours  Haldol 5mg PRN Q6 hours  Atarax 50mg PRN Q6 hours.

## 2018-12-27 NOTE — PROGRESS NOTE BEHAVIORAL HEALTH - PROBLEM SELECTOR PLAN 1
continue 1:1 observation at this juliana.e  will increase zyprexa to 15mg BID to address continued psychosis.   Seroquel 50mg HS  Benadryl 50mg PRN Q6 hours  Klonopin 1mg PRN Q6 hours  Haldol 5mg PRN Q6 hours  Atarax 50mg PRN Q6 hours.

## 2018-12-27 NOTE — PROGRESS NOTE BEHAVIORAL HEALTH - OTHER
fidgety, lays down, sits up, stands up and then lays down again paranoid disorganized Own voice telling him to "go through with it" referring to committing suicide

## 2018-12-27 NOTE — PROGRESS NOTE BEHAVIORAL HEALTH - NSBHCHARTREVIEWVS_PSY_A_CORE FT
Vital Signs Last 24 Hrs  T(C): 36.7 (27 Dec 2018 09:14), Max: 37.1 (26 Dec 2018 16:18)  T(F): 98.1 (27 Dec 2018 09:14), Max: 98.7 (26 Dec 2018 16:18)  HR: 78 (27 Dec 2018 09:14) (78 - 85)  BP: 119/83 (27 Dec 2018 09:14) (114/73 - 119/83)  BP(mean): --  RR: 18 (26 Dec 2018 16:18) (18 - 18)  SpO2: --
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
## 2018-12-28 PROCEDURE — 99233 SBSQ HOSP IP/OBS HIGH 50: CPT

## 2018-12-28 RX ADMIN — Medication 1 MILLIGRAM(S): at 21:33

## 2018-12-28 RX ADMIN — Medication 50 MILLIGRAM(S): at 22:56

## 2018-12-28 RX ADMIN — OLANZAPINE 15 MILLIGRAM(S): 15 TABLET, FILM COATED ORAL at 21:33

## 2018-12-28 RX ADMIN — Medication 1 APPLICATION(S): at 07:13

## 2018-12-28 RX ADMIN — METHADONE HYDROCHLORIDE 10 MILLIGRAM(S): 40 TABLET ORAL at 10:13

## 2018-12-28 RX ADMIN — Medication 1 MILLIGRAM(S): at 12:24

## 2018-12-28 RX ADMIN — OLANZAPINE 15 MILLIGRAM(S): 15 TABLET, FILM COATED ORAL at 10:13

## 2018-12-28 RX ADMIN — HALOPERIDOL DECANOATE 5 MILLIGRAM(S): 100 INJECTION INTRAMUSCULAR at 22:57

## 2018-12-28 RX ADMIN — Medication 2 MILLIGRAM(S): at 19:06

## 2018-12-28 RX ADMIN — Medication 2 MILLIGRAM(S): at 21:33

## 2018-12-28 RX ADMIN — QUETIAPINE FUMARATE 50 MILLIGRAM(S): 200 TABLET, FILM COATED ORAL at 21:32

## 2018-12-28 RX ADMIN — Medication 50 MILLIGRAM(S): at 19:06
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
## 2018-12-28 NOTE — PROGRESS NOTE BEHAVIORAL HEALTH - NSBHCHARTREVIEWVS_PSY_A_CORE FT
Vital Signs Last 24 Hrs  T(C): 36.9 (28 Dec 2018 09:43), Max: 36.9 (28 Dec 2018 09:43)  T(F): 98.5 (28 Dec 2018 09:43), Max: 98.5 (28 Dec 2018 09:43)  HR: 73 (28 Dec 2018 09:43) (73 - 75)  BP: 122/79 (28 Dec 2018 09:43) (112/74 - 122/79)  BP(mean): --  RR: 16 (27 Dec 2018 16:42) (16 - 16)  SpO2: --

## 2018-12-28 NOTE — PROGRESS NOTE BEHAVIORAL HEALTH - PROBLEM SELECTOR PLAN 1
continue 1:1 observation at this juliana.e  will increase zyprexa to 15mg BID to address continued psychosis.   Seroquel 50mg HS  Benadryl 50mg PRN Q6 hours  Klonopin 1mg PRN Q6 hours  Haldol 5mg PRN Q6 hours  Atarax 50mg PRN Q6 hours.  Considering Clozaril trial and state hospitalization continue 1:1 observation at this time  will cont zyprexa to 15mg BID to address continued psychosis.   Seroquel 50mg HS  Benadryl 50mg PRN Q6 hours  Klonopin 1mg PRN Q6 hours  Haldol 5mg PRN Q6 hours  Atarax 50mg PRN Q6 hours.  Considering Clozaril trial, ECT or state hospitalization

## 2018-12-28 NOTE — PROGRESS NOTE BEHAVIORAL HEALTH - NSBHFUPINTERVALHXFT_PSY_A_CORE
Pt interviewed in room, remains under constant observation for continuing suicidal ideation with intent and plan. Pt reports overall he feels minimally better but still appears very constricted and irritable. Pt also continues to be highly paranoid, "theres a plot against me", "why can't I get in touch with my ?", pt reassured that no one is tampering with phones. Pt also states "I know people think Im acting, but Im not". When asked about suicidality, pt endorses active suicidality and says "I'd rather cut myself than let someone else hurt me". Still hears his own voice in his own head. Denies HI, VH. Pt remains on CO.  Per staff, pt remains depressed with suicidal ideation and slept well last night.  This morning, pt endorses paranoid ideation of being watched, he reports voices within his head now, and remains in behavioral control.  Pt interviewed in room, remains under constant observation for continuing suicidal ideation with intent and plan. Pt reports overall he feels minimally better but still appears very constricted and irritable. Pt also continues to be highly paranoid, "theres a plot against me", "why can't I get in touch with my ?", pt reassured that no one is tampering with phones. Pt also states "I know people think Im acting, but Im not". When asked about suicidality, pt endorses active suicidality and says "I'd rather cut myself than let someone else hurt me". Still hears his own voice in his own head. Denies DAVIN LAZO.

## 2018-12-28 NOTE — PROGRESS NOTE BEHAVIORAL HEALTH - PROBLEM SELECTOR PLAN 2
will increase zyprexa to 15mg BID to address continued psychosis.   Seroquel 50mg HS  *considering changing to haldol instead of zyprexa  Benadryl 50mg PRN Q6 hours  Klonopin 1mg PRN Q6 hours  Haldol 5mg PRN Q6 hours  Atarax 50mg PRN Q6 hours.  Considering Clozaril trial and state hospitalization will cont zyprexa to 15mg BID to address continued psychosis.   Seroquel 50mg HS  *considering changing to haldol instead of zyprexa  Benadryl 50mg PRN Q6 hours  Klonopin 1mg PRN Q6 hours  Haldol 5mg PRN Q6 hours  Atarax 50mg PRN Q6 hours.  Considering Clozaril trial and state hospitalization
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
## 2018-12-28 NOTE — PROGRESS NOTE BEHAVIORAL HEALTH - THOUGHT PROCESS
Tangential/Impaired reasoning/Disorganized/Illogical/Perseverative/Other Tangential/Impaired reasoning/Other/Illogical/Perseverative

## 2018-12-29 RX ADMIN — OLANZAPINE 15 MILLIGRAM(S): 15 TABLET, FILM COATED ORAL at 21:27

## 2018-12-29 RX ADMIN — OLANZAPINE 15 MILLIGRAM(S): 15 TABLET, FILM COATED ORAL at 09:37

## 2018-12-29 RX ADMIN — METHADONE HYDROCHLORIDE 10 MILLIGRAM(S): 40 TABLET ORAL at 09:37

## 2018-12-29 RX ADMIN — HALOPERIDOL DECANOATE 5 MILLIGRAM(S): 100 INJECTION INTRAMUSCULAR at 14:30

## 2018-12-29 RX ADMIN — Medication 1 MILLIGRAM(S): at 21:27

## 2018-12-29 RX ADMIN — QUETIAPINE FUMARATE 50 MILLIGRAM(S): 200 TABLET, FILM COATED ORAL at 21:27

## 2018-12-29 RX ADMIN — Medication 2 MILLIGRAM(S): at 21:27

## 2018-12-29 RX ADMIN — Medication 50 MILLIGRAM(S): at 14:30

## 2018-12-29 NOTE — PROGRESS NOTE BEHAVIORAL HEALTH - NSBHFUPINTERVALHXFT_PSY_A_CORE
No acute events overnight. Patient assessed at bedside with 1:1 present. Patient says he finally had a good night sleep last night. He feels ''much better'' than yesterday, although still feels depressed and anxious. Denies hearing any voices currently. Says he still has suicidal thoughts but does not plan or intend to act on them currently. Ate breakfast this morning, going to groups, compliant with all medications. Says he thinks he needs longterm state hospitalization and does not feel he has had all questions answered. Medications help him relax.

## 2018-12-29 NOTE — PROGRESS NOTE BEHAVIORAL HEALTH - PROBLEM SELECTOR PLAN 1
continue 1:1 observation at this time  will cont zyprexa to 15mg BID to address continued psychosis.   Seroquel 50mg HS  Benadryl 50mg PRN Q6 hours  Klonopin 1mg PRN Q6 hours  Haldol 5mg PRN Q6 hours  Atarax 50mg PRN Q6 hours.  Considering Clozaril trial, ECT or state hospitalization
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
## 2018-12-29 NOTE — PROGRESS NOTE BEHAVIORAL HEALTH - NSBHCHARTREVIEWVS_PSY_A_CORE FT
Vital Signs Last 24 Hrs  T(C): 36.4 (29 Dec 2018 08:35), Max: 37.2 (28 Dec 2018 17:00)  T(F): 97.6 (29 Dec 2018 08:35), Max: 98.9 (28 Dec 2018 17:00)  HR: 71 (29 Dec 2018 08:35) (71 - 75)  BP: 134/86 (29 Dec 2018 08:35) (104/71 - 134/86)  BP(mean): --  RR: 20 (29 Dec 2018 08:35) (17 - 20)  SpO2: --

## 2018-12-29 NOTE — PROGRESS NOTE BEHAVIORAL HEALTH - PROBLEM SELECTOR PLAN 2
will cont zyprexa to 15mg BID to address continued psychosis.   Seroquel 50mg HS  *considering changing to haldol instead of zyprexa  Benadryl 50mg PRN Q6 hours  Klonopin 1mg PRN Q6 hours  Haldol 5mg PRN Q6 hours  Atarax 50mg PRN Q6 hours.  Considering Clozaril trial and state hospitalization

## 2018-12-30 RX ORDER — ACETAMINOPHEN 500 MG
650 TABLET ORAL EVERY 6 HOURS
Qty: 0 | Refills: 0 | Status: DISCONTINUED | OUTPATIENT
Start: 2018-12-30 | End: 2019-01-29

## 2018-12-30 RX ADMIN — Medication 650 MILLIGRAM(S): at 22:31

## 2018-12-30 RX ADMIN — Medication 1 MILLIGRAM(S): at 10:41

## 2018-12-30 RX ADMIN — Medication 650 MILLIGRAM(S): at 23:30

## 2018-12-30 RX ADMIN — Medication 50 MILLIGRAM(S): at 10:40

## 2018-12-30 RX ADMIN — METHADONE HYDROCHLORIDE 10 MILLIGRAM(S): 40 TABLET ORAL at 09:43

## 2018-12-30 RX ADMIN — HALOPERIDOL DECANOATE 5 MILLIGRAM(S): 100 INJECTION INTRAMUSCULAR at 16:42

## 2018-12-30 RX ADMIN — OLANZAPINE 15 MILLIGRAM(S): 15 TABLET, FILM COATED ORAL at 20:59

## 2018-12-30 RX ADMIN — Medication 1 APPLICATION(S): at 10:43

## 2018-12-30 RX ADMIN — Medication 1 APPLICATION(S): at 20:57

## 2018-12-30 RX ADMIN — Medication 1 APPLICATION(S): at 09:43

## 2018-12-30 RX ADMIN — Medication 2 MILLIGRAM(S): at 20:57

## 2018-12-30 RX ADMIN — Medication 50 MILLIGRAM(S): at 16:42

## 2018-12-30 RX ADMIN — OLANZAPINE 15 MILLIGRAM(S): 15 TABLET, FILM COATED ORAL at 09:43

## 2018-12-30 RX ADMIN — QUETIAPINE FUMARATE 50 MILLIGRAM(S): 200 TABLET, FILM COATED ORAL at 20:56
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
## 2018-12-30 NOTE — PROGRESS NOTE BEHAVIORAL HEALTH - NSBHFUPINTERVALHXFT_PSY_A_CORE
No acute events overnight. Patient assessed at bedside with 1:1 present. Patient reports that he has been feeling a little better, however still reports feeling depressed and reports having discussed with team about possibly going to another psychiatric facility after leaving [facility]. He denies HI/AVH, however still endorse some PI of feeling that people are always taking about him. He is attending groups, sleep is improved, and eating meals without issues. He is compliant with medications, denies medication side effects. He still endorses SI and doesn't contract for safety and thus would benefit from continued 1:1 observation by hospital staff.    Patient says he finally had a good night sleep last night. He feels ''much better'' than yesterday, although still feels depressed and anxious. Denies hearing any voices currently. Says he still has suicidal thoughts but does not plan or intend to act on them currently. Ate breakfast this morning, going to groups, compliant with all medications. Says he thinks he needs longterm state hospitalization and does not feel he has had all questions answered. Medications help him relax.

## 2018-12-31 PROCEDURE — 99232 SBSQ HOSP IP/OBS MODERATE 35: CPT

## 2018-12-31 RX ORDER — METHADONE HYDROCHLORIDE 40 MG/1
10 TABLET ORAL DAILY
Qty: 0 | Refills: 0 | Status: DISCONTINUED | OUTPATIENT
Start: 2018-12-31 | End: 2019-01-07

## 2018-12-31 RX ORDER — CLONAZEPAM 1 MG
1 TABLET ORAL EVERY 6 HOURS
Qty: 0 | Refills: 0 | Status: DISCONTINUED | OUTPATIENT
Start: 2018-12-31 | End: 2019-01-07

## 2018-12-31 RX ADMIN — Medication 1 MILLIGRAM(S): at 21:34

## 2018-12-31 RX ADMIN — HALOPERIDOL DECANOATE 5 MILLIGRAM(S): 100 INJECTION INTRAMUSCULAR at 22:33

## 2018-12-31 RX ADMIN — HALOPERIDOL DECANOATE 5 MILLIGRAM(S): 100 INJECTION INTRAMUSCULAR at 14:06

## 2018-12-31 RX ADMIN — Medication 1 APPLICATION(S): at 09:57

## 2018-12-31 RX ADMIN — OLANZAPINE 15 MILLIGRAM(S): 15 TABLET, FILM COATED ORAL at 09:54

## 2018-12-31 RX ADMIN — Medication 2 MILLIGRAM(S): at 21:35

## 2018-12-31 RX ADMIN — Medication 50 MILLIGRAM(S): at 14:06

## 2018-12-31 RX ADMIN — METHADONE HYDROCHLORIDE 10 MILLIGRAM(S): 40 TABLET ORAL at 09:54

## 2018-12-31 RX ADMIN — Medication 50 MILLIGRAM(S): at 22:33

## 2018-12-31 RX ADMIN — Medication 50 MILLIGRAM(S): at 17:37

## 2018-12-31 RX ADMIN — Medication 2 MILLIGRAM(S): at 17:37

## 2018-12-31 RX ADMIN — OLANZAPINE 15 MILLIGRAM(S): 15 TABLET, FILM COATED ORAL at 21:34

## 2018-12-31 RX ADMIN — QUETIAPINE FUMARATE 50 MILLIGRAM(S): 200 TABLET, FILM COATED ORAL at 21:34
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
## 2018-12-31 NOTE — PROGRESS NOTE BEHAVIORAL HEALTH - NSBHCHARTREVIEWVS_PSY_A_CORE FT
Vital Signs Last 24 Hrs  T(C): 36.4 (31 Dec 2018 08:53), Max: 36.9 (30 Dec 2018 16:26)  T(F): 97.5 (31 Dec 2018 08:53), Max: 98.5 (30 Dec 2018 16:26)  HR: 71 (31 Dec 2018 08:53) (71 - 77)  BP: 117/79 (31 Dec 2018 08:53) (104/85 - 123/86)  BP(mean): --  RR: 20 (31 Dec 2018 08:53) (18 - 20)  SpO2: --

## 2018-12-31 NOTE — PROGRESS NOTE BEHAVIORAL HEALTH - NSBHFUPINTERVALHXFT_PSY_A_CORE
Pt interviewed in room. Reports feeling very anxious, and brings up the idea of a long term care facility, requesting more info. Still reports he cannot get in touch with  and certain family members such as his uncle. Interviewer offered to contact uncle on behalf of pt and pt declined. Pt reassured that phones are not being tampered with or manipulated in any way besides the off-hours. Continues to reports paranoid thoughts and ideations, reports "why are there always people listening?". Pt also reports that his suicidality is not improving much but reports the meds are helping a little overall. Reports sleeping well over the weekend and a good appetite. Continues to report that he hears voices in his head, however now they are offering positive words such as "try to stay focused" and "don't lose control" as opposed to negative commands to harm himself in previous days. Denies HI and VH. Pt interviewed in room. Reports feeling very anxious, and brings up with staff  the idea of a long term care facility, requesting more info. Still reports to staff that he cannot get in touch with  and certain family members such as his uncle. Interviewer offered to contact uncle on behalf of pt and pt declined. Pt reassured that phones are not being tampered with or manipulated in any way besides the off-hours. Continues to reports paranoid thoughts and ideations, reports "why are there always people listening?". Pt also reports that his suicidality is not improving much but reports the meds are helping a little overall. Reports sleeping well over the weekend and a good appetite. Continues to report that he hears voices in his head, however now they are offering positive words such as "try to stay focused" and "don't lose control" as opposed to negative commands to harm himself in previous days. Denies HI and VH.

## 2018-12-31 NOTE — PROGRESS NOTE BEHAVIORAL HEALTH - THOUGHT PROCESS
Tangential/Perseverative/Linear/Impaired reasoning Linear/Impaired reasoning/Tangential/Other/Perseverative

## 2018-12-31 NOTE — PROGRESS NOTE BEHAVIORAL HEALTH - NSBHFUPINTERVALCCFT_PSY_A_CORE
"I feel like shit" Tells the writer (RR) that he feels a little better but tells others he feels awful.  Almost smiles when speaking to writer.

## 2019-01-01 RX ADMIN — METHADONE HYDROCHLORIDE 10 MILLIGRAM(S): 40 TABLET ORAL at 10:34

## 2019-01-01 RX ADMIN — OLANZAPINE 15 MILLIGRAM(S): 15 TABLET, FILM COATED ORAL at 20:53

## 2019-01-01 RX ADMIN — Medication 1 APPLICATION(S): at 20:53

## 2019-01-01 RX ADMIN — Medication 50 MILLIGRAM(S): at 22:17

## 2019-01-01 RX ADMIN — Medication 2 MILLIGRAM(S): at 15:53

## 2019-01-01 RX ADMIN — Medication 50 MILLIGRAM(S): at 20:53

## 2019-01-01 RX ADMIN — OLANZAPINE 15 MILLIGRAM(S): 15 TABLET, FILM COATED ORAL at 10:33

## 2019-01-01 RX ADMIN — HALOPERIDOL DECANOATE 5 MILLIGRAM(S): 100 INJECTION INTRAMUSCULAR at 22:17

## 2019-01-01 RX ADMIN — QUETIAPINE FUMARATE 50 MILLIGRAM(S): 200 TABLET, FILM COATED ORAL at 20:53

## 2019-01-01 RX ADMIN — Medication 1 MILLIGRAM(S): at 23:45

## 2019-01-01 RX ADMIN — Medication 1 MILLIGRAM(S): at 10:34

## 2019-01-02 PROCEDURE — 99232 SBSQ HOSP IP/OBS MODERATE 35: CPT

## 2019-01-02 RX ORDER — BACITRACIN ZINC 500 UNIT/G
1 OINTMENT IN PACKET (EA) TOPICAL
Qty: 0 | Refills: 0 | Status: DISCONTINUED | OUTPATIENT
Start: 2019-01-02 | End: 2019-01-10

## 2019-01-02 RX ADMIN — Medication 2 MILLIGRAM(S): at 10:13

## 2019-01-02 RX ADMIN — Medication 1 MILLIGRAM(S): at 21:15

## 2019-01-02 RX ADMIN — Medication 1 APPLICATION(S): at 21:17

## 2019-01-02 RX ADMIN — QUETIAPINE FUMARATE 50 MILLIGRAM(S): 200 TABLET, FILM COATED ORAL at 21:14

## 2019-01-02 RX ADMIN — Medication 1 APPLICATION(S): at 10:11

## 2019-01-02 RX ADMIN — OLANZAPINE 15 MILLIGRAM(S): 15 TABLET, FILM COATED ORAL at 10:11

## 2019-01-02 RX ADMIN — HALOPERIDOL DECANOATE 5 MILLIGRAM(S): 100 INJECTION INTRAMUSCULAR at 19:13

## 2019-01-02 RX ADMIN — OLANZAPINE 15 MILLIGRAM(S): 15 TABLET, FILM COATED ORAL at 21:17

## 2019-01-02 RX ADMIN — Medication 2 MILLIGRAM(S): at 21:15

## 2019-01-02 RX ADMIN — METHADONE HYDROCHLORIDE 10 MILLIGRAM(S): 40 TABLET ORAL at 10:12

## 2019-01-02 RX ADMIN — Medication 50 MILLIGRAM(S): at 19:13
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
## 2019-01-02 NOTE — PROGRESS NOTE BEHAVIORAL HEALTH - NSBHFUPINTERVALCCFT_PSY_A_CORE
Continues to endorse feeling better and will allow trial of d/c of constant observation;  superficial replies ; not endorsing SI at this time.

## 2019-01-02 NOTE — PROGRESS NOTE BEHAVIORAL HEALTH - NSBHFUPINTERVALHXFT_PSY_A_CORE
Had visitors;  staff notes some slight improvement but still rather guraeded.  Interviewer offered to contact uncle on behalf of pt and pt declined. Pt reassured that phones are not being tampered with or manipulated in any way besides the off-hours. Continues to reports paranoid thoughts and ideations, reports "why are there always people listening?". Pt also reports that his suicidality is not improving much but reports the meds are helping a little overall. Reports sleeping well over the weekend and a good appetite. Continues to report that he hears voices in his head, however now they are offering positive words such as "try to stay focused" and "don't lose control" as opposed to negative commands to harm himself in previous days. Denies HI and VH.

## 2019-01-03 PROCEDURE — 99232 SBSQ HOSP IP/OBS MODERATE 35: CPT

## 2019-01-03 RX ADMIN — Medication 2 MILLIGRAM(S): at 17:33

## 2019-01-03 RX ADMIN — OLANZAPINE 15 MILLIGRAM(S): 15 TABLET, FILM COATED ORAL at 10:00

## 2019-01-03 RX ADMIN — Medication 2 MILLIGRAM(S): at 10:00

## 2019-01-03 RX ADMIN — METHADONE HYDROCHLORIDE 10 MILLIGRAM(S): 40 TABLET ORAL at 10:00

## 2019-01-03 RX ADMIN — HALOPERIDOL DECANOATE 5 MILLIGRAM(S): 100 INJECTION INTRAMUSCULAR at 17:33

## 2019-01-03 RX ADMIN — Medication 2 MILLIGRAM(S): at 22:13

## 2019-01-03 RX ADMIN — Medication 1 MILLIGRAM(S): at 10:34

## 2019-01-03 RX ADMIN — Medication 1 MILLIGRAM(S): at 22:13

## 2019-01-03 RX ADMIN — Medication 1 APPLICATION(S): at 10:00

## 2019-01-03 RX ADMIN — Medication 50 MILLIGRAM(S): at 23:47

## 2019-01-03 RX ADMIN — QUETIAPINE FUMARATE 50 MILLIGRAM(S): 200 TABLET, FILM COATED ORAL at 22:13

## 2019-01-03 RX ADMIN — OLANZAPINE 15 MILLIGRAM(S): 15 TABLET, FILM COATED ORAL at 22:13

## 2019-01-03 RX ADMIN — Medication 50 MILLIGRAM(S): at 17:33

## 2019-01-03 NOTE — PROGRESS NOTE BEHAVIORAL HEALTH - NSBHFUPINTERVALCCFT_PSY_A_CORE
More verbal but stands very close;  wants to know why people are spying on him and why they have cameras ;  interested in long term hospitalization.
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
## 2019-01-03 NOTE — PROGRESS NOTE BEHAVIORAL HEALTH - NSBHCHARTREVIEWVS_PSY_A_CORE FT
Vital Signs Last 24 Hrs  T(C): 36.5 (02 Jan 2019 16:41), Max: 36.5 (02 Jan 2019 16:41)  T(F): 97.7 (02 Jan 2019 16:41), Max: 97.7 (02 Jan 2019 16:41)  HR: 90 (02 Jan 2019 16:41) (90 - 90)  BP: 115/76 (02 Jan 2019 16:41) (115/76 - 115/76)  BP(mean): --  RR: 18 (02 Jan 2019 16:41) (18 - 18)  SpO2: -- Vital Signs Last 24 Hrs  T(C): 36.9 (03 Jan 2019 16:01), Max: 36.9 (03 Jan 2019 16:01)  T(F): 98.4 (03 Jan 2019 16:01), Max: 98.4 (03 Jan 2019 16:01)  HR: 86 (03 Jan 2019 16:01) (81 - 90)  BP: 126/79 (03 Jan 2019 16:01) (111/76 - 126/79)  BP(mean): --  RR: 18 (03 Jan 2019 16:01) (18 - 18)  SpO2: --

## 2019-01-03 NOTE — PROGRESS NOTE BEHAVIORAL HEALTH - NSBHFUPINTERVALHXFT_PSY_A_CORE
continues to have paranoid delusions; MSE: alert; anxious; spends much time on the phone; no tremor; normal gait; fair eye contact; stands to close and whispers; internally  preoccupied; paranoid delusions about staff and cameras etc.  speech soft spontaneous; ij: poor but accepts treatement .  cognition intact.

## 2019-01-04 PROCEDURE — 90853 GROUP PSYCHOTHERAPY: CPT

## 2019-01-04 PROCEDURE — 99232 SBSQ HOSP IP/OBS MODERATE 35: CPT | Mod: 25

## 2019-01-04 RX ORDER — OMEGA-3 ACID ETHYL ESTERS 1 G
2 CAPSULE ORAL
Qty: 0 | Refills: 0 | Status: DISCONTINUED | OUTPATIENT
Start: 2019-01-04 | End: 2019-02-04

## 2019-01-04 RX ADMIN — Medication 50 MILLIGRAM(S): at 07:47

## 2019-01-04 RX ADMIN — Medication 1 APPLICATION(S): at 21:46

## 2019-01-04 RX ADMIN — METHADONE HYDROCHLORIDE 10 MILLIGRAM(S): 40 TABLET ORAL at 10:35

## 2019-01-04 RX ADMIN — Medication 1 MILLIGRAM(S): at 21:43

## 2019-01-04 RX ADMIN — Medication 1 GRAM(S): at 21:43

## 2019-01-04 RX ADMIN — Medication 1 APPLICATION(S): at 21:43

## 2019-01-04 RX ADMIN — Medication 2 MILLIGRAM(S): at 14:11

## 2019-01-04 RX ADMIN — Medication 2 MILLIGRAM(S): at 21:44

## 2019-01-04 RX ADMIN — HALOPERIDOL DECANOATE 5 MILLIGRAM(S): 100 INJECTION INTRAMUSCULAR at 21:43

## 2019-01-04 RX ADMIN — Medication 1 MILLIGRAM(S): at 14:11

## 2019-01-04 RX ADMIN — OLANZAPINE 15 MILLIGRAM(S): 15 TABLET, FILM COATED ORAL at 21:42

## 2019-01-04 RX ADMIN — HALOPERIDOL DECANOATE 5 MILLIGRAM(S): 100 INJECTION INTRAMUSCULAR at 07:43

## 2019-01-04 RX ADMIN — OLANZAPINE 15 MILLIGRAM(S): 15 TABLET, FILM COATED ORAL at 10:34

## 2019-01-04 RX ADMIN — Medication 50 MILLIGRAM(S): at 21:44
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
## 2019-01-04 NOTE — PROGRESS NOTE BEHAVIORAL HEALTH - NSBHFUPINTERVALCCFT_PSY_A_CORE
willing to take fish oil for mood stabilization ; willing to have addition lab work;  able to converse with writer without making suspcioius or paranoid remark.  no sleep appetite or pain issues.

## 2019-01-04 NOTE — CHART NOTE - NSCHARTNOTEFT_GEN_A_CORE
Jose Sánchez  Date of Service  4/1/2019  Group Name: Self-Other Group                     	Number of Attendees: 6  Duration 45 minutes  Diagnosis Code: F22  DOCUMENTATION OF PARTICIPATION AND RESPONSE OF INDIVIDUAL TO GROUP TREATMENT  Behavior in Group (check all that apply):  ?X Showed insight             ? XActive in discussion            ? Offered constructive input         ? No apparent interest  ?X Showed interest           ? Quietly engaged                ? Supportive to others                  ? Appeared distracted  ? Showed leadership        ? Withdrawn                       ?Not supportive to others            ? Disruptive  Individual’s Mood:    ?Stable     ?XDepressed/Sad     ?Anxious     ?Angry     ?Other:   Risk Assessment  	X? None Reported or Observed   ? Danger to Self:  ? Ideation ? Plan  ? Intent  ? Attempt       ? Danger to Others:  ? Ideation ? Plan  ? Intent  ? Attempt   Describe in detail (cont. below as needed):    Goal(s)/Objective(s) Addressed:  Assessment, Decrease symptoms, Improve level of independent functioning, improve social/vocational/coping skills, Improve interpersonal relationships  Intervention(s) / Method(s) Provided: Stress management and coping skills, social skills was addressed  Self and Others Group:  “How I see myself vs how others see me” activity  Response to Intervention(s) and Progress Toward Goals and Objectives: Pt engaged and discussed that he has a family, however, inside, he feels like a failure, and relies on external objects for comfort, either healthy like exercise or unhealthy like heroin.   Plan/Additional Information (any recommendations or determinations for continued or revised treatment): Continue attending group on Mondays and Fridays   Completed By - Print Staff Name/Credentials:  Lev Atwood, PhD / Staff Psychologist	CPT Code 33074  Start Time 10:45		  Stop Time  11:30	   Duration in Minutes 45 minutes

## 2019-01-04 NOTE — PROGRESS NOTE BEHAVIORAL HEALTH - NSBHFUPINTERVALHXFT_PSY_A_CORE
able to participate in a meaningful way in psycchodrama therapy;  continues to have paranoid delusions; MSE: alert; anxious; spends attends art group;  no tremor; normal gait; fair eye contact; stands to close and whispers; internally  preoccupied; paranoid delusions about staff and cameras etc.  speech soft spontaneous; ij: poor but accepts treatement .  cognition intact.

## 2019-01-04 NOTE — PROGRESS NOTE BEHAVIORAL HEALTH - NSBHCHARTREVIEWVS_PSY_A_CORE FT
Vital Signs Last 24 Hrs  T(C): 36.9 (03 Jan 2019 16:01), Max: 36.9 (03 Jan 2019 16:01)  T(F): 98.4 (03 Jan 2019 16:01), Max: 98.4 (03 Jan 2019 16:01)  HR: 86 (03 Jan 2019 16:01) (86 - 86)  BP: 126/79 (03 Jan 2019 16:01) (126/79 - 126/79)  BP(mean): --  RR: 18 (03 Jan 2019 16:01) (18 - 18)  SpO2: -- Vital Signs Last 24 Hrs  T(C): 36.9 (04 Jan 2019 16:33), Max: 36.9 (04 Jan 2019 16:33)  T(F): 98.5 (04 Jan 2019 16:33), Max: 98.5 (04 Jan 2019 16:33)  HR: 80 (04 Jan 2019 16:33) (78 - 80)  BP: 127/78 (04 Jan 2019 16:33) (113/75 - 127/78)  BP(mean): --  RR: 18 (04 Jan 2019 16:33) (18 - 20)  SpO2: --

## 2019-01-04 NOTE — PROGRESS NOTE BEHAVIORAL HEALTH - PROBLEM SELECTOR PLAN 1
continue 1:1 observation at this time  will cont zyprexa to 15mg BID to address continued psychosis.   Seroquel 50mg HS  Benadryl 50mg PRN Q6 hours  Constant Observation at night for suicidal thinking.

## 2019-01-05 LAB
FOLATE SERPL-MCNC: 16.1 NG/ML — SIGNIFICANT CHANGE UP
VIT B12 SERPL-MCNC: 559 PG/ML — SIGNIFICANT CHANGE UP (ref 232–1245)

## 2019-01-05 RX ADMIN — Medication 2 MILLIGRAM(S): at 21:35

## 2019-01-05 RX ADMIN — METHADONE HYDROCHLORIDE 10 MILLIGRAM(S): 40 TABLET ORAL at 10:43

## 2019-01-05 RX ADMIN — Medication 1 MILLIGRAM(S): at 21:34

## 2019-01-05 RX ADMIN — Medication 50 MILLIGRAM(S): at 12:52

## 2019-01-05 RX ADMIN — OLANZAPINE 15 MILLIGRAM(S): 15 TABLET, FILM COATED ORAL at 21:34

## 2019-01-05 RX ADMIN — HALOPERIDOL DECANOATE 5 MILLIGRAM(S): 100 INJECTION INTRAMUSCULAR at 21:34

## 2019-01-05 RX ADMIN — Medication 2 GRAM(S): at 21:34

## 2019-01-05 RX ADMIN — Medication 2 GRAM(S): at 10:40

## 2019-01-05 RX ADMIN — Medication 50 MILLIGRAM(S): at 21:34

## 2019-01-05 RX ADMIN — HALOPERIDOL DECANOATE 5 MILLIGRAM(S): 100 INJECTION INTRAMUSCULAR at 12:52

## 2019-01-05 RX ADMIN — OLANZAPINE 15 MILLIGRAM(S): 15 TABLET, FILM COATED ORAL at 10:43

## 2019-01-06 LAB
T PALLIDUM AB TITR SER: NEGATIVE — SIGNIFICANT CHANGE UP
VIT D25+D1,25 OH+D1,25 PNL SERPL-MCNC: 25.5 PG/ML — SIGNIFICANT CHANGE UP (ref 19.9–79.3)

## 2019-01-06 RX ADMIN — Medication 2 GRAM(S): at 21:36

## 2019-01-06 RX ADMIN — Medication 1 MILLIGRAM(S): at 15:48

## 2019-01-06 RX ADMIN — METHADONE HYDROCHLORIDE 10 MILLIGRAM(S): 40 TABLET ORAL at 10:15

## 2019-01-06 RX ADMIN — OLANZAPINE 15 MILLIGRAM(S): 15 TABLET, FILM COATED ORAL at 10:16

## 2019-01-06 RX ADMIN — Medication 2 GRAM(S): at 10:16

## 2019-01-06 RX ADMIN — OLANZAPINE 15 MILLIGRAM(S): 15 TABLET, FILM COATED ORAL at 21:36

## 2019-01-06 RX ADMIN — Medication 2 MILLIGRAM(S): at 15:49

## 2019-01-06 RX ADMIN — Medication 50 MILLIGRAM(S): at 21:38

## 2019-01-06 RX ADMIN — Medication 1 APPLICATION(S): at 12:38

## 2019-01-06 RX ADMIN — Medication 2 MILLIGRAM(S): at 21:37

## 2019-01-06 RX ADMIN — HALOPERIDOL DECANOATE 5 MILLIGRAM(S): 100 INJECTION INTRAMUSCULAR at 12:49

## 2019-01-07 PROCEDURE — 90853 GROUP PSYCHOTHERAPY: CPT

## 2019-01-07 PROCEDURE — 99232 SBSQ HOSP IP/OBS MODERATE 35: CPT

## 2019-01-07 RX ORDER — METHADONE HYDROCHLORIDE 40 MG/1
10 TABLET ORAL DAILY
Qty: 0 | Refills: 0 | Status: DISCONTINUED | OUTPATIENT
Start: 2019-01-07 | End: 2019-01-11

## 2019-01-07 RX ADMIN — Medication 1 MILLIGRAM(S): at 14:00

## 2019-01-07 RX ADMIN — Medication 2 GRAM(S): at 10:44

## 2019-01-07 RX ADMIN — HALOPERIDOL DECANOATE 5 MILLIGRAM(S): 100 INJECTION INTRAMUSCULAR at 00:02

## 2019-01-07 RX ADMIN — OLANZAPINE 15 MILLIGRAM(S): 15 TABLET, FILM COATED ORAL at 10:42

## 2019-01-07 RX ADMIN — OLANZAPINE 15 MILLIGRAM(S): 15 TABLET, FILM COATED ORAL at 20:59

## 2019-01-07 RX ADMIN — Medication 50 MILLIGRAM(S): at 00:02

## 2019-01-07 RX ADMIN — Medication 1 MILLIGRAM(S): at 20:59

## 2019-01-07 RX ADMIN — Medication 2 GRAM(S): at 20:59

## 2019-01-07 RX ADMIN — Medication 2 MILLIGRAM(S): at 14:00

## 2019-01-07 RX ADMIN — Medication 2 MILLIGRAM(S): at 21:01

## 2019-01-07 RX ADMIN — Medication 1 MILLIGRAM(S): at 00:02

## 2019-01-07 RX ADMIN — Medication 50 MILLIGRAM(S): at 20:59

## 2019-01-07 RX ADMIN — METHADONE HYDROCHLORIDE 10 MILLIGRAM(S): 40 TABLET ORAL at 10:42
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
## 2019-01-07 NOTE — PROGRESS NOTE BEHAVIORAL HEALTH - NSBHFUPINTERVALHXFT_PSY_A_CORE
per staff, pt is hopeful, attends groups, took klonopin amongst other prns and slept last night.  this am, he states that continues to feel depressed, he denies si on the unit and feels safe on the unit. +paranoid ideation.  no sleep appetite or pain issues.  able to participate in a meaningful way in psychodrama therapy;  continues to have paranoid delusions; MSE: alert; anxious; spends attends art group;  no tremor; normal gait; fair eye contact; internally  preoccupied; paranoid delusions about staff and cameras etc.  speech soft spontaneous; ij: poor but accepts treatement .  cognition intact.

## 2019-01-07 NOTE — CHART NOTE - NSCHARTNOTEFT_GEN_A_CORE
Name: oJse Sánchez  Date of Service  1/8/2019  Group Name: Self-Other Group                     	Number of Attendees: 10  Duration 45 minutes  Diagnosis Code: F25.1  DOCUMENTATION OF PARTICIPATION AND RESPONSE OF INDIVIDUAL TO GROUP TREATMENT  Behavior in Group (check all that apply):  ? XShowed insight             ? XActive in discussion            ? XOffered constructive input         ? No apparent interest  ?X Showed interest           ? Quietly engaged                ? XSupportive to others                  ? Appeared distracted  ? Showed leadership        ? Withdrawn                       ?Not supportive to others            ? Disruptive  Individual’s Mood:    ?Stable     ?XDepressed/Sad     ?XAnxious     ?Angry     ?Other  Risk Assessment  	X? None Reported or Observed   ? Danger to Self:  ? Ideation ? Plan  ? Intent  ? Attempt       ? Danger to Others:  ? Ideation ? Plan  ? Intent  ? Attempt   Describe in detail (cont. below as needed):    Goal(s)/Objective(s) Addressed:  Assessment, Decrease symptoms, Improve level of independent functioning, improve social/vocational/coping skills, Improve interpersonal relationships  Intervention(s) / Method(s) Provided: Stress management and coping skills, interpersonal effectiveness skills were addressed  Self and Others Group:  “Positive and Negative relationships” activity.  Response to Intervention(s) and Progress Toward Goals and Objectives: Pt engaged and discussed two relationships with self and sister and reflected on how he contributed to the positive and negative aspects of the relationship. Pt discussed his relationship with his sister as supportive while discussed his struggles with drug addiction.   Plan/Additional Information (any recommendations or determinations for continued or revised treatment): Continue attending group on Mondays and Fridays   Completed By - Print Staff Name/Credentials:  Lev Atwood, PhD / Staff Psychologist	CPT Code 77918  Start Time 10:45		  Stop Time  11:30	   Duration in Minutes 45 minutes

## 2019-01-07 NOTE — PROGRESS NOTE BEHAVIORAL HEALTH - NSBHCHARTREVIEWVS_PSY_A_CORE FT
Vital Signs Last 24 Hrs  T(C): 36.3 (07 Jan 2019 08:40), Max: 37.2 (06 Jan 2019 16:20)  T(F): 97.3 (07 Jan 2019 08:40), Max: 98.9 (06 Jan 2019 16:20)  HR: 80 (07 Jan 2019 08:40) (80 - 90)  BP: 115/77 (07 Jan 2019 08:40) (115/77 - 118/75)  BP(mean): --  RR: 20 (07 Jan 2019 08:40) (18 - 20)  SpO2: --

## 2019-01-08 PROCEDURE — 99232 SBSQ HOSP IP/OBS MODERATE 35: CPT

## 2019-01-08 RX ORDER — CLONAZEPAM 1 MG
1 TABLET ORAL EVERY 6 HOURS
Qty: 0 | Refills: 0 | Status: DISCONTINUED | OUTPATIENT
Start: 2019-01-08 | End: 2019-01-08

## 2019-01-08 RX ORDER — SERTRALINE 25 MG/1
25 TABLET, FILM COATED ORAL DAILY
Qty: 0 | Refills: 0 | Status: DISCONTINUED | OUTPATIENT
Start: 2019-01-08 | End: 2019-01-10

## 2019-01-08 RX ORDER — CLONAZEPAM 1 MG
1 TABLET ORAL EVERY 8 HOURS
Qty: 0 | Refills: 0 | Status: DISCONTINUED | OUTPATIENT
Start: 2019-01-08 | End: 2019-01-11

## 2019-01-08 RX ADMIN — Medication 2 MILLIGRAM(S): at 21:45

## 2019-01-08 RX ADMIN — Medication 50 MILLIGRAM(S): at 21:45

## 2019-01-08 RX ADMIN — Medication 1 MILLIGRAM(S): at 21:45

## 2019-01-08 RX ADMIN — HALOPERIDOL DECANOATE 5 MILLIGRAM(S): 100 INJECTION INTRAMUSCULAR at 10:22

## 2019-01-08 RX ADMIN — OLANZAPINE 15 MILLIGRAM(S): 15 TABLET, FILM COATED ORAL at 21:45

## 2019-01-08 RX ADMIN — METHADONE HYDROCHLORIDE 10 MILLIGRAM(S): 40 TABLET ORAL at 10:13

## 2019-01-08 RX ADMIN — Medication 2 GRAM(S): at 21:45

## 2019-01-08 RX ADMIN — Medication 1 MILLIGRAM(S): at 13:14

## 2019-01-08 RX ADMIN — Medication 1 APPLICATION(S): at 10:14

## 2019-01-08 RX ADMIN — OLANZAPINE 15 MILLIGRAM(S): 15 TABLET, FILM COATED ORAL at 11:03

## 2019-01-08 RX ADMIN — Medication 2 GRAM(S): at 10:13

## 2019-01-08 RX ADMIN — Medication 50 MILLIGRAM(S): at 10:22

## 2019-01-08 NOTE — PROGRESS NOTE BEHAVIORAL HEALTH - NSBHFUPINTERVALHXFT_PSY_A_CORE
staff reports some improved relatedness ; however outpatient plan complicated by patient being on CO at night for SI   MSE: more conversational but focused on drugs and meds.  speech clear; gait wnl; no tremor; unclear about SI or AH but better eye contact; more fluent conversation with less blocking.  oriented; cog intact. ij: poor drug focused  ; vague and evasive about sxs.

## 2019-01-08 NOTE — PROGRESS NOTE BEHAVIORAL HEALTH - NSBHCHARTREVIEWVS_PSY_A_CORE FT
Vital Signs Last 24 Hrs  T(C): 37.2 (07 Jan 2019 17:00), Max: 37.2 (07 Jan 2019 17:00)  T(F): 98.9 (07 Jan 2019 17:00), Max: 98.9 (07 Jan 2019 17:00)  HR: 81 (07 Jan 2019 17:00) (81 - 81)  BP: 114/71 (07 Jan 2019 17:00) (114/71 - 114/71)  BP(mean): --  RR: 18 (07 Jan 2019 17:00) (18 - 18)  SpO2: -- Vital Signs Last 24 Hrs  T(C): 37.4 (08 Jan 2019 09:34), Max: 37.4 (08 Jan 2019 09:34)  T(F): 99.4 (08 Jan 2019 09:34), Max: 99.4 (08 Jan 2019 09:34)  HR: 88 (08 Jan 2019 09:34) (81 - 88)  BP: 124/75 (08 Jan 2019 09:34) (114/71 - 124/75)  BP(mean): --  RR: 20 (08 Jan 2019 09:34) (18 - 20)  SpO2: --

## 2019-01-08 NOTE — PROGRESS NOTE BEHAVIORAL HEALTH - NSBHFUPSUICINTERVALFT_PSY_A_CORE
"choke on paper or marbles in the game room"
"I would rather cut myself than let someone else hurt me"
When asked regarding SI pt replied "you think I don't have the balls to do it?" and then gestured in the bathroom and stated "I could just put this around my neck" and walked back to bed.
"I would rather cut myself than let someone else hurt me"
Own voice telling him to "go through with it" referring to committing suicide
"I would rather cut myself than let someone else hurt me"

## 2019-01-08 NOTE — PROGRESS NOTE BEHAVIORAL HEALTH - NSBHFUPINTERVALCCFT_PSY_A_CORE
"Can I have something for my ADHD?"  Feels too drowsy.  "How about Welbutrin?"  discussed how that medication can have SEs of psychosis.  "How about Suboxone?"
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
## 2019-01-09 PROCEDURE — 99231 SBSQ HOSP IP/OBS SF/LOW 25: CPT

## 2019-01-09 RX ADMIN — Medication 1 MILLIGRAM(S): at 22:09

## 2019-01-09 RX ADMIN — Medication 2 GRAM(S): at 10:37

## 2019-01-09 RX ADMIN — Medication 1 MILLIGRAM(S): at 14:14

## 2019-01-09 RX ADMIN — OLANZAPINE 15 MILLIGRAM(S): 15 TABLET, FILM COATED ORAL at 10:36

## 2019-01-09 RX ADMIN — SERTRALINE 25 MILLIGRAM(S): 25 TABLET, FILM COATED ORAL at 10:36

## 2019-01-09 RX ADMIN — OLANZAPINE 15 MILLIGRAM(S): 15 TABLET, FILM COATED ORAL at 22:09

## 2019-01-09 RX ADMIN — Medication 2 MILLIGRAM(S): at 22:08

## 2019-01-09 RX ADMIN — Medication 50 MILLIGRAM(S): at 22:09

## 2019-01-09 RX ADMIN — Medication 50 MILLIGRAM(S): at 17:35

## 2019-01-09 RX ADMIN — Medication 2 GRAM(S): at 22:09

## 2019-01-09 RX ADMIN — METHADONE HYDROCHLORIDE 10 MILLIGRAM(S): 40 TABLET ORAL at 10:36

## 2019-01-09 RX ADMIN — HALOPERIDOL DECANOATE 5 MILLIGRAM(S): 100 INJECTION INTRAMUSCULAR at 17:35

## 2019-01-09 NOTE — PROGRESS NOTE BEHAVIORAL HEALTH - NSBHCHARTREVIEWVS_PSY_A_CORE FT
Vital Signs Last 24 Hrs  T(C): 36.7 (09 Jan 2019 08:53), Max: 36.7 (09 Jan 2019 08:53)  T(F): 98.1 (09 Jan 2019 08:53), Max: 98.1 (09 Jan 2019 08:53)  HR: 981 (09 Jan 2019 08:53) (981 - 981)  BP: 124/82 (09 Jan 2019 08:53) (124/82 - 124/82)  BP(mean): --  RR: --  SpO2: --
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
## 2019-01-09 NOTE — PROGRESS NOTE BEHAVIORAL HEALTH - NSBHFUPINTERVALHXFT_PSY_A_CORE
per staff, pt is focused on discharge bc he cfontinues to worry that staff is listening to his conversations.  he is paranoiced with constricted affect.  he adds to udnersigned that he is interested in going to state hospitalization so that he can feel safe there.  denies si on the unit and feels safe on the unit. +paranoid ideation.  no sleep appetite or pain issues.  cooperates with individual sessions with therapists.  continues to have paranoid delusions; MSE: alert; anxious; spends attends art group;  no tremor; normal gait; fair eye contact; internally  preoccupied; paranoid delusions about staff and cameras etc.  speech soft spontaneous; ij: poor but accepts treatement .  cognition intact.

## 2019-01-10 PROCEDURE — 99232 SBSQ HOSP IP/OBS MODERATE 35: CPT

## 2019-01-10 RX ORDER — SERTRALINE 25 MG/1
50 TABLET, FILM COATED ORAL DAILY
Qty: 0 | Refills: 0 | Status: DISCONTINUED | OUTPATIENT
Start: 2019-01-10 | End: 2019-02-12

## 2019-01-10 RX ORDER — BACITRACIN ZINC 500 UNIT/G
1 OINTMENT IN PACKET (EA) TOPICAL
Qty: 0 | Refills: 0 | Status: DISCONTINUED | OUTPATIENT
Start: 2019-01-10 | End: 2019-01-17

## 2019-01-10 RX ORDER — DIPHENHYDRAMINE HCL 50 MG
50 CAPSULE ORAL EVERY 6 HOURS
Qty: 0 | Refills: 0 | Status: DISCONTINUED | OUTPATIENT
Start: 2019-01-10 | End: 2019-02-12

## 2019-01-10 RX ADMIN — Medication 1 MILLIGRAM(S): at 22:14

## 2019-01-10 RX ADMIN — OLANZAPINE 15 MILLIGRAM(S): 15 TABLET, FILM COATED ORAL at 22:13

## 2019-01-10 RX ADMIN — Medication 50 MILLIGRAM(S): at 15:41

## 2019-01-10 RX ADMIN — METHADONE HYDROCHLORIDE 10 MILLIGRAM(S): 40 TABLET ORAL at 10:47

## 2019-01-10 RX ADMIN — OLANZAPINE 15 MILLIGRAM(S): 15 TABLET, FILM COATED ORAL at 10:47

## 2019-01-10 RX ADMIN — Medication 2 GRAM(S): at 10:47

## 2019-01-10 RX ADMIN — Medication 1 APPLICATION(S): at 10:49

## 2019-01-10 RX ADMIN — HALOPERIDOL DECANOATE 5 MILLIGRAM(S): 100 INJECTION INTRAMUSCULAR at 15:41

## 2019-01-10 RX ADMIN — SERTRALINE 25 MILLIGRAM(S): 25 TABLET, FILM COATED ORAL at 10:47

## 2019-01-10 RX ADMIN — Medication 2 MILLIGRAM(S): at 22:17

## 2019-01-10 RX ADMIN — Medication 1 MILLIGRAM(S): at 10:46

## 2019-01-10 RX ADMIN — Medication 2 MILLIGRAM(S): at 10:49

## 2019-01-10 RX ADMIN — Medication 2 GRAM(S): at 22:13
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
## 2019-01-10 NOTE — PROGRESS NOTE BEHAVIORAL HEALTH - NSBHFUPINTERVALHXFT_PSY_A_CORE
per staff, pt is focused on discharge bc he continues to worry that staff is listening to his conversations.  mood is less constricted and dialogue is now possible without eruption of paranoid thinking.    he adds to some staff that he is interested in going to state hospitalization so that he can feel safe there.  denies si on the unit and feels safe on the unit. +paranoid ideation.  no sleep appetite or pain issues.  cooperates with individual sessions with therapists.  continues to have paranoid delusions; MSE: alert; anxious; spends attends art group;  no tremor; normal gait; fair eye contact; internally  preoccupied; paranoid delusions about staff and cameras etc.  speech soft spontaneous; ij: poor but accepts treatement .  cognition intact.

## 2019-01-10 NOTE — PROGRESS NOTE BEHAVIORAL HEALTH - NSBHFUPINTERVALCCFT_PSY_A_CORE
focused on Suboxone;  discussed issue of CO as a problem regarding aftercare planning yet does not endorse not needing CO for suicide.  Med focused.  No sleep appetite or pain issues.

## 2019-01-10 NOTE — PROGRESS NOTE BEHAVIORAL HEALTH - NSBHCHARTREVIEWVS_PSY_A_CORE FT
Vital Signs Last 24 Hrs  T(C): 36.8 (09 Jan 2019 16:25), Max: 36.8 (09 Jan 2019 16:25)  T(F): 98.3 (09 Jan 2019 16:25), Max: 98.3 (09 Jan 2019 16:25)  HR: 84 (09 Jan 2019 16:25) (84 - 981)  BP: 119/81 (09 Jan 2019 16:25) (119/81 - 124/82)  BP(mean): --  RR: 18 (09 Jan 2019 16:25) (18 - 18)  SpO2: --

## 2019-01-11 PROCEDURE — 90853 GROUP PSYCHOTHERAPY: CPT

## 2019-01-11 PROCEDURE — 99232 SBSQ HOSP IP/OBS MODERATE 35: CPT | Mod: 25

## 2019-01-11 RX ORDER — CLONAZEPAM 1 MG
0.5 TABLET ORAL EVERY 8 HOURS
Qty: 0 | Refills: 0 | Status: DISCONTINUED | OUTPATIENT
Start: 2019-01-11 | End: 2019-01-16

## 2019-01-11 RX ORDER — HALOPERIDOL DECANOATE 100 MG/ML
5 INJECTION INTRAMUSCULAR EVERY 6 HOURS
Qty: 0 | Refills: 0 | Status: DISCONTINUED | OUTPATIENT
Start: 2019-01-11 | End: 2019-01-17

## 2019-01-11 RX ORDER — METHADONE HYDROCHLORIDE 40 MG/1
20 TABLET ORAL DAILY
Qty: 0 | Refills: 0 | Status: DISCONTINUED | OUTPATIENT
Start: 2019-01-11 | End: 2019-01-16

## 2019-01-11 RX ADMIN — Medication 2 MILLIGRAM(S): at 13:30

## 2019-01-11 RX ADMIN — Medication 2 GRAM(S): at 11:12

## 2019-01-11 RX ADMIN — Medication 0.5 MILLIGRAM(S): at 13:30

## 2019-01-11 RX ADMIN — OLANZAPINE 15 MILLIGRAM(S): 15 TABLET, FILM COATED ORAL at 22:07

## 2019-01-11 RX ADMIN — Medication 0.5 MILLIGRAM(S): at 21:58

## 2019-01-11 RX ADMIN — Medication 2 GRAM(S): at 21:59

## 2019-01-11 RX ADMIN — OLANZAPINE 15 MILLIGRAM(S): 15 TABLET, FILM COATED ORAL at 11:11

## 2019-01-11 RX ADMIN — METHADONE HYDROCHLORIDE 20 MILLIGRAM(S): 40 TABLET ORAL at 13:30

## 2019-01-11 RX ADMIN — SERTRALINE 50 MILLIGRAM(S): 25 TABLET, FILM COATED ORAL at 11:12

## 2019-01-11 RX ADMIN — HALOPERIDOL DECANOATE 5 MILLIGRAM(S): 100 INJECTION INTRAMUSCULAR at 18:20

## 2019-01-11 RX ADMIN — Medication 1 APPLICATION(S): at 21:59

## 2019-01-11 NOTE — PROGRESS NOTE BEHAVIORAL HEALTH - NSBHCHARTREVIEWVS_PSY_A_CORE FT
Vital Signs Last 24 Hrs  T(C): 36.7 (11 Jan 2019 09:33), Max: 36.8 (10 Garry 2019 16:02)  T(F): 98.1 (11 Jan 2019 09:33), Max: 98.3 (10 Garry 2019 16:02)  HR: 86 (11 Jan 2019 09:33) (86 - 91)  BP: 131/81 (11 Jan 2019 09:33) (118/77 - 131/81)  BP(mean): --  RR: 18 (10 Garry 2019 16:02) (18 - 18)  SpO2: --

## 2019-01-11 NOTE — PROGRESS NOTE BEHAVIORAL HEALTH - NSBHFUPINTERVALCCFT_PSY_A_CORE
wants Suboxone;  states categorically that he is no longer suicidal.  Contacted Steph Smyth counsellor (consented) at [facility]; patient gets Suboxone 8/2 because of poor attendance and co-abuse of benzos  (Partial agonist is safer).    Patient continues to make paranoid statekents but wants more Methadone for "pain" (spine and neck).   Claims he sought treatment for sub abuse to get opioids for pain (but counsellor states patient used to shoot heroin).

## 2019-01-11 NOTE — CHART NOTE - NSCHARTNOTEFT_GEN_A_CORE
Name: Jose Sánchez  Date of Service  1/11/2019  Group Name: Self-Other Group                     	Number of Attendees: 6  Duration 55 minutes  Diagnosis Code: F25.1  DOCUMENTATION OF PARTICIPATION AND RESPONSE OF INDIVIDUAL TO GROUP TREATMENT  Behavior in Group (check all that apply):  ? Showed insight             ? XActive in discussion            ? Offered constructive input         ? No apparent interest  ?X Showed interest           ? Quietly engaged                ?X Supportive to others                  ? Appeared distracted  ? Showed leadership        ? Withdrawn                       ?Not supportive to others            ? Disruptive  Individual’s Mood:    ?Stable     ?XDepressed/Sad     ?Anxious     ?Angry     ?Other  Risk Assessment  	X? None Reported or Observed   ? Danger to Self:  ? Ideation ? Plan  ? Intent  ? Attempt       ? Danger to Others:  ? Ideation ? Plan  ? Intent  ? Attempt   Describe in detail (cont. below as needed):    Goal(s)/Objective(s) Addressed:  Assessment, Decrease symptoms, Improve level of independent functioning, improve social/vocational/coping skills, Improve interpersonal relationships  Intervention(s) / Method(s) Provided: Stress management and coping skills, interpersonal effectiveness skills were addressed  Self and Others Group:  “What makes a relationship healthy?” activity.  Response to Intervention(s) and Progress Toward Goals and Objectives: Pt engaged and discussed his views on what constitutes a healthy relationship, and what are some of his non-negotiables. Including how he at times was unable to see that a rltn was unhealthy for him until his doctor commented on his relationship.   Plan/Additional Information (any recommendations or determinations for continued or revised treatment): Continue attending group on Mondays and Fridays   Completed By - Print Staff Name/Credentials:  Lev Atwood, PhD / Staff Psychologist	CPT Code 73812  Start Time 10:45		  Stop Time  11:40	   Duration in Minutes 45 minutes Name: Jose Sánchez  Date of Service  1/11/2019  Group Name: Self-Other Group                     	Number of Attendees: 6  Duration 45 minutes  Diagnosis Code: F25.1  DOCUMENTATION OF PARTICIPATION AND RESPONSE OF INDIVIDUAL TO GROUP TREATMENT  Behavior in Group (check all that apply):  ? Showed insight             ? XActive in discussion            ? Offered constructive input         ? No apparent interest  ?X Showed interest           ? Quietly engaged                ?X Supportive to others                  ? Appeared distracted  ? Showed leadership        ? Withdrawn                       ?Not supportive to others            ? Disruptive  Individual’s Mood:    ?Stable     ?XDepressed/Sad     ?Anxious     ?Angry     ?Other  Risk Assessment  	X? None Reported or Observed   ? Danger to Self:  ? Ideation ? Plan  ? Intent  ? Attempt       ? Danger to Others:  ? Ideation ? Plan  ? Intent  ? Attempt   Describe in detail (cont. below as needed):    Goal(s)/Objective(s) Addressed:  Assessment, Decrease symptoms, Improve level of independent functioning, improve social/vocational/coping skills, Improve interpersonal relationships  Intervention(s) / Method(s) Provided: Stress management and coping skills, interpersonal effectiveness skills were addressed  Self and Others Group:  “What makes a relationship healthy?” activity.  Response to Intervention(s) and Progress Toward Goals and Objectives: Pt engaged and discussed his views on what constitutes a healthy relationship, and what are some of his non-negotiables. Including how he at times was unable to see that a rltn was unhealthy for him until his doctor commented on his relationship.   Plan/Additional Information (any recommendations or determinations for continued or revised treatment): Continue attending group on Mondays and Fridays   Completed By - Print Staff Name/Credentials:  Lev Atwood, PhD / Staff Psychologist	CPT Code 68560  Start Time 10:45		  Stop Time  11:30	   Duration in Minutes 45 minutes
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
## 2019-01-12 RX ADMIN — Medication 1 APPLICATION(S): at 12:50

## 2019-01-12 RX ADMIN — OLANZAPINE 15 MILLIGRAM(S): 15 TABLET, FILM COATED ORAL at 21:42

## 2019-01-12 RX ADMIN — HALOPERIDOL DECANOATE 5 MILLIGRAM(S): 100 INJECTION INTRAMUSCULAR at 11:48

## 2019-01-12 RX ADMIN — Medication 2 GRAM(S): at 10:08

## 2019-01-12 RX ADMIN — OLANZAPINE 15 MILLIGRAM(S): 15 TABLET, FILM COATED ORAL at 10:08

## 2019-01-12 RX ADMIN — Medication 2 GRAM(S): at 21:41

## 2019-01-12 RX ADMIN — METHADONE HYDROCHLORIDE 20 MILLIGRAM(S): 40 TABLET ORAL at 10:08

## 2019-01-12 RX ADMIN — SERTRALINE 50 MILLIGRAM(S): 25 TABLET, FILM COATED ORAL at 10:09

## 2019-01-12 RX ADMIN — Medication 2 MILLIGRAM(S): at 21:41

## 2019-01-12 RX ADMIN — Medication 0.5 MILLIGRAM(S): at 23:39

## 2019-01-12 RX ADMIN — Medication 0.5 MILLIGRAM(S): at 07:42

## 2019-01-12 RX ADMIN — Medication 50 MILLIGRAM(S): at 11:48

## 2019-01-13 RX ADMIN — HALOPERIDOL DECANOATE 5 MILLIGRAM(S): 100 INJECTION INTRAMUSCULAR at 11:06

## 2019-01-13 RX ADMIN — SERTRALINE 50 MILLIGRAM(S): 25 TABLET, FILM COATED ORAL at 10:16

## 2019-01-13 RX ADMIN — Medication 2 GRAM(S): at 10:15

## 2019-01-13 RX ADMIN — OLANZAPINE 15 MILLIGRAM(S): 15 TABLET, FILM COATED ORAL at 22:01

## 2019-01-13 RX ADMIN — Medication 2 MILLIGRAM(S): at 10:17

## 2019-01-13 RX ADMIN — Medication 0.5 MILLIGRAM(S): at 22:01

## 2019-01-13 RX ADMIN — OLANZAPINE 15 MILLIGRAM(S): 15 TABLET, FILM COATED ORAL at 10:16

## 2019-01-13 RX ADMIN — Medication 0.5 MILLIGRAM(S): at 14:36

## 2019-01-13 RX ADMIN — Medication 2 GRAM(S): at 22:01

## 2019-01-13 RX ADMIN — METHADONE HYDROCHLORIDE 20 MILLIGRAM(S): 40 TABLET ORAL at 10:16

## 2019-01-13 RX ADMIN — Medication 0.5 MILLIGRAM(S): at 07:16

## 2019-01-13 RX ADMIN — HALOPERIDOL DECANOATE 5 MILLIGRAM(S): 100 INJECTION INTRAMUSCULAR at 22:36

## 2019-01-13 RX ADMIN — Medication 2 MILLIGRAM(S): at 22:03

## 2019-01-14 PROCEDURE — 99232 SBSQ HOSP IP/OBS MODERATE 35: CPT

## 2019-01-14 PROCEDURE — 90853 GROUP PSYCHOTHERAPY: CPT

## 2019-01-14 RX ORDER — HYDROXYZINE HCL 10 MG
50 TABLET ORAL EVERY 6 HOURS
Qty: 0 | Refills: 0 | Status: DISCONTINUED | OUTPATIENT
Start: 2019-01-14 | End: 2019-02-13

## 2019-01-14 RX ORDER — NICOTINE POLACRILEX 2 MG
2 GUM BUCCAL
Qty: 0 | Refills: 0 | Status: DISCONTINUED | OUTPATIENT
Start: 2019-01-14 | End: 2019-02-12

## 2019-01-14 RX ADMIN — METHADONE HYDROCHLORIDE 20 MILLIGRAM(S): 40 TABLET ORAL at 11:14

## 2019-01-14 RX ADMIN — HALOPERIDOL DECANOATE 5 MILLIGRAM(S): 100 INJECTION INTRAMUSCULAR at 18:32

## 2019-01-14 RX ADMIN — Medication 2 GRAM(S): at 11:15

## 2019-01-14 RX ADMIN — OLANZAPINE 15 MILLIGRAM(S): 15 TABLET, FILM COATED ORAL at 11:14

## 2019-01-14 RX ADMIN — Medication 2 MILLIGRAM(S): at 06:37

## 2019-01-14 RX ADMIN — Medication 0.5 MILLIGRAM(S): at 16:49

## 2019-01-14 RX ADMIN — Medication 2 GRAM(S): at 21:25

## 2019-01-14 RX ADMIN — Medication 0.5 MILLIGRAM(S): at 21:39

## 2019-01-14 RX ADMIN — SERTRALINE 50 MILLIGRAM(S): 25 TABLET, FILM COATED ORAL at 11:14

## 2019-01-14 RX ADMIN — Medication 0.5 MILLIGRAM(S): at 06:36

## 2019-01-14 RX ADMIN — Medication 50 MILLIGRAM(S): at 18:33

## 2019-01-14 RX ADMIN — Medication 50 MILLIGRAM(S): at 21:25

## 2019-01-14 RX ADMIN — HALOPERIDOL DECANOATE 5 MILLIGRAM(S): 100 INJECTION INTRAMUSCULAR at 11:15

## 2019-01-14 RX ADMIN — OLANZAPINE 15 MILLIGRAM(S): 15 TABLET, FILM COATED ORAL at 21:25

## 2019-01-14 RX ADMIN — Medication 2 MILLIGRAM(S): at 16:49

## 2019-01-14 RX ADMIN — Medication 2 MILLIGRAM(S): at 21:25
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
## 2019-01-14 NOTE — PROGRESS NOTE BEHAVIORAL HEALTH - NSBHCHARTREVIEWVS_PSY_A_CORE FT
Vital Signs Last 24 Hrs  T(C): 36.7 (14 Jan 2019 09:24), Max: 36.7 (14 Jan 2019 09:24)  T(F): 98 (14 Jan 2019 09:24), Max: 98 (14 Jan 2019 09:24)  HR: 76 (14 Jan 2019 09:24) (76 - 99)  BP: 116/68 (14 Jan 2019 09:24) (116/68 - 125/76)  BP(mean): --  RR: 17 (13 Jan 2019 16:15) (17 - 17)  SpO2: --

## 2019-01-14 NOTE — PROGRESS NOTE BEHAVIORAL HEALTH - NSBHFUPINTERVALCCFT_PSY_A_CORE
continues to state that Suboxone is needed for pain despite clinic's assertion that it has to do with benzo abuse.   limited conversation; suspicious and guarded but endorses long term care.

## 2019-01-15 PROCEDURE — 99232 SBSQ HOSP IP/OBS MODERATE 35: CPT

## 2019-01-15 RX ADMIN — Medication 0.5 MILLIGRAM(S): at 14:56

## 2019-01-15 RX ADMIN — Medication 2 MILLIGRAM(S): at 06:10

## 2019-01-15 RX ADMIN — METHADONE HYDROCHLORIDE 20 MILLIGRAM(S): 40 TABLET ORAL at 09:36

## 2019-01-15 RX ADMIN — OLANZAPINE 15 MILLIGRAM(S): 15 TABLET, FILM COATED ORAL at 09:36

## 2019-01-15 RX ADMIN — HALOPERIDOL DECANOATE 5 MILLIGRAM(S): 100 INJECTION INTRAMUSCULAR at 18:30

## 2019-01-15 RX ADMIN — Medication 2 GRAM(S): at 21:32

## 2019-01-15 RX ADMIN — OLANZAPINE 15 MILLIGRAM(S): 15 TABLET, FILM COATED ORAL at 21:32

## 2019-01-15 RX ADMIN — Medication 0.5 MILLIGRAM(S): at 22:07

## 2019-01-15 RX ADMIN — Medication 2 GRAM(S): at 09:36

## 2019-01-15 RX ADMIN — Medication 2 MILLIGRAM(S): at 18:29

## 2019-01-15 RX ADMIN — Medication 2 MILLIGRAM(S): at 21:32

## 2019-01-15 RX ADMIN — Medication 2 MILLIGRAM(S): at 09:40

## 2019-01-15 RX ADMIN — Medication 0.5 MILLIGRAM(S): at 06:10

## 2019-01-15 RX ADMIN — HALOPERIDOL DECANOATE 5 MILLIGRAM(S): 100 INJECTION INTRAMUSCULAR at 11:38

## 2019-01-15 RX ADMIN — SERTRALINE 50 MILLIGRAM(S): 25 TABLET, FILM COATED ORAL at 09:36

## 2019-01-15 NOTE — PROGRESS NOTE BEHAVIORAL HEALTH - NSBHFUPINTERVALHXFT_PSY_A_CORE
patient continues with patanoid themes to other staff but only says few items to the writer (RR) but indicates need for more treatment; off of CO ; not acutely suicidal.  patient off of CO; mood slightly improved but anxious and paranoid;  Liat Ro MSW at [place] confirms dx of Schizoaffective illness and Multiple Substance Addictions.   Patient  continues paranoid but at times more focused.  Plan for longterm hospitlaization in view of fragility and chronic paranoid ideation and depressed mood  with significant anxiety.

## 2019-01-15 NOTE — PROGRESS NOTE BEHAVIORAL HEALTH - NSBHFUPINTERVALCCFT_PSY_A_CORE
Makes little comments to the writer; does attend some groups; anxious and suspicious when talking to the writer.  However no sleep appetite or pain issues.

## 2019-01-15 NOTE — PROGRESS NOTE BEHAVIORAL HEALTH - NSBHCHARTREVIEWVS_PSY_A_CORE FT
Vital Signs Last 24 Hrs  T(C): 37.1 (14 Jan 2019 17:00), Max: 37.1 (14 Jan 2019 17:00)  T(F): 98.7 (14 Jan 2019 17:00), Max: 98.7 (14 Jan 2019 17:00)  HR: 84 (14 Jan 2019 17:00) (76 - 84)  BP: 14/76 (14 Jan 2019 17:00) (14/76 - 116/68)  BP(mean): --  RR: 18 (14 Jan 2019 17:00) (18 - 18)  SpO2: --
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
## 2019-01-16 PROCEDURE — 99232 SBSQ HOSP IP/OBS MODERATE 35: CPT

## 2019-01-16 RX ORDER — METHADONE HYDROCHLORIDE 40 MG/1
20 TABLET ORAL DAILY
Qty: 0 | Refills: 0 | Status: DISCONTINUED | OUTPATIENT
Start: 2019-01-16 | End: 2019-01-22

## 2019-01-16 RX ORDER — CLONAZEPAM 1 MG
0.5 TABLET ORAL EVERY 8 HOURS
Qty: 0 | Refills: 0 | Status: DISCONTINUED | OUTPATIENT
Start: 2019-01-16 | End: 2019-01-22

## 2019-01-16 RX ADMIN — OLANZAPINE 15 MILLIGRAM(S): 15 TABLET, FILM COATED ORAL at 21:43

## 2019-01-16 RX ADMIN — Medication 0.5 MILLIGRAM(S): at 21:42

## 2019-01-16 RX ADMIN — Medication 2 MILLIGRAM(S): at 09:54

## 2019-01-16 RX ADMIN — Medication 0.5 MILLIGRAM(S): at 07:31

## 2019-01-16 RX ADMIN — Medication 50 MILLIGRAM(S): at 17:28

## 2019-01-16 RX ADMIN — METHADONE HYDROCHLORIDE 20 MILLIGRAM(S): 40 TABLET ORAL at 09:54

## 2019-01-16 RX ADMIN — OLANZAPINE 15 MILLIGRAM(S): 15 TABLET, FILM COATED ORAL at 09:54

## 2019-01-16 RX ADMIN — HALOPERIDOL DECANOATE 5 MILLIGRAM(S): 100 INJECTION INTRAMUSCULAR at 12:10

## 2019-01-16 RX ADMIN — Medication 0.5 MILLIGRAM(S): at 15:16

## 2019-01-16 RX ADMIN — METHADONE HYDROCHLORIDE 20 MILLIGRAM(S): 40 TABLET ORAL at 21:43

## 2019-01-16 RX ADMIN — Medication 2 GRAM(S): at 21:42

## 2019-01-16 RX ADMIN — Medication 2 GRAM(S): at 09:54

## 2019-01-16 RX ADMIN — Medication 50 MILLIGRAM(S): at 23:46

## 2019-01-16 RX ADMIN — SERTRALINE 50 MILLIGRAM(S): 25 TABLET, FILM COATED ORAL at 09:54

## 2019-01-16 RX ADMIN — Medication 1 APPLICATION(S): at 21:43

## 2019-01-16 RX ADMIN — Medication 2 MILLIGRAM(S): at 21:42

## 2019-01-16 RX ADMIN — Medication 1 APPLICATION(S): at 21:42

## 2019-01-16 NOTE — PROGRESS NOTE BEHAVIORAL HEALTH - NSBHFUPINTERVALCCFT_PSY_A_CORE
approaches the writer leaving group;  "What about my Suboxone";  not responsive to discussion that Suboxone used as a Methadone substitute by clinic because of codependence on benzodiazepines;  "Will I get it where I am going?"   Despite med seeking focus continues to be disorganized and paranoid but mood is better; difficult to discuss issues with.
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
## 2019-01-16 NOTE — PROGRESS NOTE BEHAVIORAL HEALTH - NSBHCHARTREVIEWVS_PSY_A_CORE FT
Vital Signs Last 24 Hrs  T(C): 36.4 (15 Garry 2019 09:41), Max: 36.4 (15 Garry 2019 09:41)  T(F): 97.5 (15 Garry 2019 09:41), Max: 97.5 (15 Garry 2019 09:41)  HR: 69 (15 Garry 2019 09:41) (69 - 69)  BP: 109/73 (15 Garry 2019 09:41) (109/73 - 109/73)  BP(mean): --  RR: --  SpO2: --

## 2019-01-16 NOTE — CHART NOTE - NSCHARTNOTEFT_GEN_A_CORE
Name: Jose Sánchez  Date of Service  1/14/2019  Group Name: Self-Other Group                     	Number of Attendees: 8  Duration 45 minutes  Diagnosis Code: F25.1  DOCUMENTATION OF PARTICIPATION AND RESPONSE OF INDIVIDUAL TO GROUP TREATMENT  Behavior in Group (check all that apply):  ? Showed insight             ? XActive in discussion            ? XOffered constructive input         ? No apparent interest  ? Showed interest           ? Quietly engaged                ? Supportive to others                  ? Appeared distracted  ? Showed leadership        ? Withdrawn                       ?Not supportive to others            ? Disruptive  Individual’s Mood:    ?Stable     ?Depressed/Sad     ?Anxious     ?Angry     ?XOther Elevated  Risk Assessment  	X? None Reported or Observed   ? Danger to Self:  ? Ideation ? Plan  ? Intent  ? Attempt       ? Danger to Others:  ? Ideation ? Plan  ? Intent  ? Attempt   Describe in detail (cont. below as needed):    Goal(s)/Objective(s) Addressed:  Assessment, Decrease symptoms, Improve level of independent functioning, improve social/vocational/coping skills, Improve interpersonal relationships  Intervention(s) / Method(s) Provided: Stress management and coping skills, interpersonal effectiveness skills were addressed  Self and Others Group:  “Getting to know your partner” activity in pairs and then as a whole group.   Response to Intervention(s) and Progress Toward Goals and Objectives: Pt engaged with his partner and discussed that he likes solitude.     Plan/Additional Information (any recommendations or determinations for continued or revised treatment): Continue attending group on Mondays and Fridays   Completed By - Print Staff Name/Credentials:  Lev Atwood, PhD / Staff Psychologist	CPT Code 26167  Start Time 10:45		  Stop Time  11:30	   Duration in Minutes 45 minutes

## 2019-01-17 PROCEDURE — 99232 SBSQ HOSP IP/OBS MODERATE 35: CPT

## 2019-01-17 RX ORDER — HALOPERIDOL DECANOATE 100 MG/ML
5 INJECTION INTRAMUSCULAR EVERY 6 HOURS
Qty: 0 | Refills: 0 | Status: DISCONTINUED | OUTPATIENT
Start: 2019-01-17 | End: 2019-02-19

## 2019-01-17 RX ADMIN — Medication 50 MILLIGRAM(S): at 12:26

## 2019-01-17 RX ADMIN — OLANZAPINE 15 MILLIGRAM(S): 15 TABLET, FILM COATED ORAL at 10:38

## 2019-01-17 RX ADMIN — Medication 2 MILLIGRAM(S): at 10:36

## 2019-01-17 RX ADMIN — Medication 0.5 MILLIGRAM(S): at 07:29

## 2019-01-17 RX ADMIN — OLANZAPINE 15 MILLIGRAM(S): 15 TABLET, FILM COATED ORAL at 22:28

## 2019-01-17 RX ADMIN — Medication 50 MILLIGRAM(S): at 22:00

## 2019-01-17 RX ADMIN — HALOPERIDOL DECANOATE 5 MILLIGRAM(S): 100 INJECTION INTRAMUSCULAR at 12:26

## 2019-01-17 RX ADMIN — Medication 2 GRAM(S): at 22:28

## 2019-01-17 RX ADMIN — HALOPERIDOL DECANOATE 5 MILLIGRAM(S): 100 INJECTION INTRAMUSCULAR at 01:50

## 2019-01-17 RX ADMIN — Medication 2 GRAM(S): at 10:37

## 2019-01-17 RX ADMIN — Medication 0.5 MILLIGRAM(S): at 15:03

## 2019-01-17 RX ADMIN — HALOPERIDOL DECANOATE 5 MILLIGRAM(S): 100 INJECTION INTRAMUSCULAR at 22:30

## 2019-01-17 RX ADMIN — METHADONE HYDROCHLORIDE 20 MILLIGRAM(S): 40 TABLET ORAL at 10:38

## 2019-01-17 RX ADMIN — Medication 0.5 MILLIGRAM(S): at 22:30

## 2019-01-17 RX ADMIN — Medication 2 MILLIGRAM(S): at 12:28

## 2019-01-17 RX ADMIN — SERTRALINE 50 MILLIGRAM(S): 25 TABLET, FILM COATED ORAL at 10:37
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
## 2019-01-17 NOTE — PROGRESS NOTE BEHAVIORAL HEALTH - NSBHFUPINTERVALCCFT_PSY_A_CORE
"Okay" when mentioning that writer would take direction from long term care facility.   Not conversational.  smiles and walks away.

## 2019-01-17 NOTE — PROGRESS NOTE BEHAVIORAL HEALTH - NSBHFUPINTERVALHXFT_PSY_A_CORE
patient continues with paranoid themes to other staff but only says few items to the writer (RR) but indicates need for more treatment; off of CO ; not acutely suicidal.  patient off of CO; mood slightly improved but anxious and paranoid;  .   Patient  continues paranoid but at times more focused.  Plan for long-term hospitalization in view of fragility and chronic paranoid ideation and depressed mood  with significant anxiety.

## 2019-01-18 PROCEDURE — 99232 SBSQ HOSP IP/OBS MODERATE 35: CPT

## 2019-01-18 RX ADMIN — OLANZAPINE 15 MILLIGRAM(S): 15 TABLET, FILM COATED ORAL at 10:21

## 2019-01-18 RX ADMIN — METHADONE HYDROCHLORIDE 20 MILLIGRAM(S): 40 TABLET ORAL at 10:21

## 2019-01-18 RX ADMIN — Medication 2 GRAM(S): at 22:20

## 2019-01-18 RX ADMIN — Medication 2 GRAM(S): at 10:22

## 2019-01-18 RX ADMIN — Medication 2 MILLIGRAM(S): at 22:20

## 2019-01-18 RX ADMIN — HALOPERIDOL DECANOATE 5 MILLIGRAM(S): 100 INJECTION INTRAMUSCULAR at 14:09

## 2019-01-18 RX ADMIN — Medication 2 MILLIGRAM(S): at 10:22

## 2019-01-18 RX ADMIN — OLANZAPINE 15 MILLIGRAM(S): 15 TABLET, FILM COATED ORAL at 22:20

## 2019-01-18 RX ADMIN — Medication 0.5 MILLIGRAM(S): at 22:20

## 2019-01-18 RX ADMIN — SERTRALINE 50 MILLIGRAM(S): 25 TABLET, FILM COATED ORAL at 10:21

## 2019-01-18 RX ADMIN — HALOPERIDOL DECANOATE 5 MILLIGRAM(S): 100 INJECTION INTRAMUSCULAR at 22:21

## 2019-01-18 RX ADMIN — Medication 0.5 MILLIGRAM(S): at 06:24

## 2019-01-18 NOTE — PROGRESS NOTE BEHAVIORAL HEALTH - NSBHCHARTREVIEWVS_PSY_A_CORE FT
Vital Signs Last 24 Hrs  T(C): 37.1 (17 Jan 2019 16:22), Max: 37.1 (17 Jan 2019 16:22)  T(F): 98.8 (17 Jan 2019 16:22), Max: 98.8 (17 Jan 2019 16:22)  HR: 82 (17 Jan 2019 16:22) (67 - 82)  BP: 115/75 (17 Jan 2019 16:22) (113/81 - 115/75)  BP(mean): --  RR: 18 (17 Jan 2019 16:22) (18 - 18)  SpO2: --

## 2019-01-18 NOTE — PROGRESS NOTE BEHAVIORAL HEALTH - NSBHFUPINTERVALCCFT_PSY_A_CORE
"When you talked about my 'legal problems' are you talking about my parole?"  "What do you know?"  "I am going to get killed here."  .
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
## 2019-01-19 RX ADMIN — OLANZAPINE 15 MILLIGRAM(S): 15 TABLET, FILM COATED ORAL at 09:32

## 2019-01-19 RX ADMIN — HALOPERIDOL DECANOATE 5 MILLIGRAM(S): 100 INJECTION INTRAMUSCULAR at 17:57

## 2019-01-19 RX ADMIN — OLANZAPINE 15 MILLIGRAM(S): 15 TABLET, FILM COATED ORAL at 22:06

## 2019-01-19 RX ADMIN — Medication 0.5 MILLIGRAM(S): at 06:38

## 2019-01-19 RX ADMIN — Medication 0.5 MILLIGRAM(S): at 12:31

## 2019-01-19 RX ADMIN — Medication 2 GRAM(S): at 09:32

## 2019-01-19 RX ADMIN — Medication 2 MILLIGRAM(S): at 09:34

## 2019-01-19 RX ADMIN — Medication 2 MILLIGRAM(S): at 22:06

## 2019-01-19 RX ADMIN — METHADONE HYDROCHLORIDE 20 MILLIGRAM(S): 40 TABLET ORAL at 09:32

## 2019-01-19 RX ADMIN — Medication 2 MILLIGRAM(S): at 16:15

## 2019-01-19 RX ADMIN — Medication 0.5 MILLIGRAM(S): at 22:06

## 2019-01-19 RX ADMIN — Medication 2 GRAM(S): at 22:06

## 2019-01-19 RX ADMIN — Medication 50 MILLIGRAM(S): at 17:58

## 2019-01-19 RX ADMIN — Medication 2 MILLIGRAM(S): at 12:32

## 2019-01-19 RX ADMIN — SERTRALINE 50 MILLIGRAM(S): 25 TABLET, FILM COATED ORAL at 09:32

## 2019-01-19 RX ADMIN — Medication 50 MILLIGRAM(S): at 16:14

## 2019-01-20 RX ADMIN — OLANZAPINE 15 MILLIGRAM(S): 15 TABLET, FILM COATED ORAL at 22:49

## 2019-01-20 RX ADMIN — Medication 2 MILLIGRAM(S): at 10:02

## 2019-01-20 RX ADMIN — Medication 2 MILLIGRAM(S): at 22:49

## 2019-01-20 RX ADMIN — OLANZAPINE 15 MILLIGRAM(S): 15 TABLET, FILM COATED ORAL at 10:01

## 2019-01-20 RX ADMIN — Medication 0.5 MILLIGRAM(S): at 22:49

## 2019-01-20 RX ADMIN — Medication 0.5 MILLIGRAM(S): at 06:16

## 2019-01-20 RX ADMIN — Medication 50 MILLIGRAM(S): at 22:49

## 2019-01-20 RX ADMIN — Medication 2 GRAM(S): at 22:49

## 2019-01-20 RX ADMIN — Medication 2 GRAM(S): at 10:01

## 2019-01-20 RX ADMIN — Medication 50 MILLIGRAM(S): at 16:45

## 2019-01-20 RX ADMIN — HALOPERIDOL DECANOATE 5 MILLIGRAM(S): 100 INJECTION INTRAMUSCULAR at 11:52

## 2019-01-20 RX ADMIN — METHADONE HYDROCHLORIDE 20 MILLIGRAM(S): 40 TABLET ORAL at 10:01

## 2019-01-20 RX ADMIN — Medication 0.5 MILLIGRAM(S): at 13:40

## 2019-01-20 RX ADMIN — SERTRALINE 50 MILLIGRAM(S): 25 TABLET, FILM COATED ORAL at 10:01

## 2019-01-20 RX ADMIN — Medication 2 MILLIGRAM(S): at 16:45

## 2019-01-21 RX ADMIN — Medication 2 GRAM(S): at 10:24

## 2019-01-21 RX ADMIN — Medication 2 MILLIGRAM(S): at 10:31

## 2019-01-21 RX ADMIN — Medication 2 MILLIGRAM(S): at 22:37

## 2019-01-21 RX ADMIN — Medication 0.5 MILLIGRAM(S): at 22:35

## 2019-01-21 RX ADMIN — Medication 0.5 MILLIGRAM(S): at 07:51

## 2019-01-21 RX ADMIN — Medication 2 GRAM(S): at 22:35

## 2019-01-21 RX ADMIN — Medication 0.5 MILLIGRAM(S): at 16:14

## 2019-01-21 RX ADMIN — SERTRALINE 50 MILLIGRAM(S): 25 TABLET, FILM COATED ORAL at 10:25

## 2019-01-21 RX ADMIN — HALOPERIDOL DECANOATE 5 MILLIGRAM(S): 100 INJECTION INTRAMUSCULAR at 11:04

## 2019-01-21 RX ADMIN — OLANZAPINE 15 MILLIGRAM(S): 15 TABLET, FILM COATED ORAL at 10:25

## 2019-01-21 RX ADMIN — OLANZAPINE 15 MILLIGRAM(S): 15 TABLET, FILM COATED ORAL at 22:36

## 2019-01-21 RX ADMIN — METHADONE HYDROCHLORIDE 20 MILLIGRAM(S): 40 TABLET ORAL at 10:25

## 2019-01-22 PROCEDURE — 99232 SBSQ HOSP IP/OBS MODERATE 35: CPT

## 2019-01-22 RX ORDER — METHADONE HYDROCHLORIDE 40 MG/1
20 TABLET ORAL DAILY
Qty: 0 | Refills: 0 | Status: DISCONTINUED | OUTPATIENT
Start: 2019-01-22 | End: 2019-01-29

## 2019-01-22 RX ORDER — CLONAZEPAM 1 MG
0.5 TABLET ORAL EVERY 8 HOURS
Qty: 0 | Refills: 0 | Status: DISCONTINUED | OUTPATIENT
Start: 2019-01-22 | End: 2019-01-29

## 2019-01-22 RX ADMIN — Medication 0.5 MILLIGRAM(S): at 06:32

## 2019-01-22 RX ADMIN — HALOPERIDOL DECANOATE 5 MILLIGRAM(S): 100 INJECTION INTRAMUSCULAR at 19:28

## 2019-01-22 RX ADMIN — Medication 2 MILLIGRAM(S): at 19:29

## 2019-01-22 RX ADMIN — Medication 50 MILLIGRAM(S): at 22:45

## 2019-01-22 RX ADMIN — OLANZAPINE 15 MILLIGRAM(S): 15 TABLET, FILM COATED ORAL at 22:44

## 2019-01-22 RX ADMIN — Medication 2 GRAM(S): at 22:44

## 2019-01-22 RX ADMIN — Medication 50 MILLIGRAM(S): at 19:28

## 2019-01-22 RX ADMIN — Medication 2 MILLIGRAM(S): at 11:02

## 2019-01-22 RX ADMIN — Medication 2 GRAM(S): at 10:59

## 2019-01-22 RX ADMIN — Medication 0.5 MILLIGRAM(S): at 22:44

## 2019-01-22 RX ADMIN — OLANZAPINE 15 MILLIGRAM(S): 15 TABLET, FILM COATED ORAL at 10:59

## 2019-01-22 RX ADMIN — Medication 0.5 MILLIGRAM(S): at 14:20

## 2019-01-22 RX ADMIN — SERTRALINE 50 MILLIGRAM(S): 25 TABLET, FILM COATED ORAL at 11:00

## 2019-01-22 RX ADMIN — HALOPERIDOL DECANOATE 5 MILLIGRAM(S): 100 INJECTION INTRAMUSCULAR at 00:54

## 2019-01-22 RX ADMIN — METHADONE HYDROCHLORIDE 20 MILLIGRAM(S): 40 TABLET ORAL at 10:59

## 2019-01-22 RX ADMIN — Medication 2 MILLIGRAM(S): at 22:45

## 2019-01-22 RX ADMIN — Medication 50 MILLIGRAM(S): at 00:54
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
## 2019-01-22 NOTE — PROGRESS NOTE BEHAVIORAL HEALTH - NSBHFUPINTERVALCCFT_PSY_A_CORE
Now talks about his left knee bothering him and swollen but continues to walk and run on both legs with no issues; later asks about Suboxone;  a little giddy but somewhat inappropriate.  .

## 2019-01-22 NOTE — PROGRESS NOTE BEHAVIORAL HEALTH - NSBHCHARTREVIEWVS_PSY_A_CORE FT
Vital Signs Last 24 Hrs  T(C): 36.3 (22 Jan 2019 09:00), Max: 36.5 (21 Jan 2019 16:16)  T(F): 97.3 (22 Jan 2019 09:00), Max: 97.7 (21 Jan 2019 16:16)  HR: 71 (22 Jan 2019 09:00) (71 - 73)  BP: 137/89 (22 Jan 2019 09:00) (119/75 - 137/89)  BP(mean): --  RR: 18 (22 Jan 2019 09:00) (16 - 18)  SpO2: --

## 2019-01-23 PROCEDURE — 99231 SBSQ HOSP IP/OBS SF/LOW 25: CPT

## 2019-01-23 RX ADMIN — Medication 2 MILLIGRAM(S): at 11:22

## 2019-01-23 RX ADMIN — HALOPERIDOL DECANOATE 5 MILLIGRAM(S): 100 INJECTION INTRAMUSCULAR at 17:23

## 2019-01-23 RX ADMIN — METHADONE HYDROCHLORIDE 20 MILLIGRAM(S): 40 TABLET ORAL at 10:09

## 2019-01-23 RX ADMIN — HALOPERIDOL DECANOATE 5 MILLIGRAM(S): 100 INJECTION INTRAMUSCULAR at 11:21

## 2019-01-23 RX ADMIN — Medication 50 MILLIGRAM(S): at 11:20

## 2019-01-23 RX ADMIN — Medication 0.5 MILLIGRAM(S): at 07:11

## 2019-01-23 RX ADMIN — SERTRALINE 50 MILLIGRAM(S): 25 TABLET, FILM COATED ORAL at 10:09

## 2019-01-23 RX ADMIN — OLANZAPINE 15 MILLIGRAM(S): 15 TABLET, FILM COATED ORAL at 10:09

## 2019-01-23 RX ADMIN — Medication 0.5 MILLIGRAM(S): at 14:18

## 2019-01-23 RX ADMIN — Medication 2 GRAM(S): at 10:09
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
## 2019-01-23 NOTE — PROGRESS NOTE BEHAVIORAL HEALTH - NSBHCHARTREVIEWVS_PSY_A_CORE FT
Vital Signs Last 24 Hrs  T(C): 36.8 (23 Jan 2019 09:50), Max: 37 (22 Jan 2019 16:16)  T(F): 98.2 (23 Jan 2019 09:50), Max: 98.6 (22 Jan 2019 16:16)  HR: 79 (23 Jan 2019 09:50) (79 - 82)  BP: 121/84 (23 Jan 2019 09:50) (118/76 - 121/84)  BP(mean): --  RR: 18 (22 Jan 2019 16:16) (18 - 18)  SpO2: --

## 2019-01-23 NOTE — PROGRESS NOTE BEHAVIORAL HEALTH - PROBLEM SELECTOR PLAN 1
will cont zyprexa to 15mg BID to address continued psychosis.   Seroquel 50mg HS  Benadryl 50mg PRN Q6 hours

## 2019-01-23 NOTE — PROGRESS NOTE BEHAVIORAL HEALTH - NSBHFUPINTERVALHXFT_PSY_A_CORE
Per staff, pt is visible, in good behavioral control, is requesting suboxone, and has left knee xray pending.  This morning, pt is visible on the unit, socializing with peers, asks about knee xray, stating that he has had discomfort for over a year, no acute changes in pain.  No evidence of pt responding to internal stimuli.  Attends select groups. No evidence of irritability.  No acute suicidality at this time. +paranoid ideation  Plan for long-term hospitalization in view of fragility and chronic paranoid ideation and depressed mood  with significant anxiety.

## 2019-01-24 PROCEDURE — 73560 X-RAY EXAM OF KNEE 1 OR 2: CPT | Mod: 26,LT

## 2019-01-24 PROCEDURE — 99232 SBSQ HOSP IP/OBS MODERATE 35: CPT

## 2019-01-24 RX ADMIN — SERTRALINE 50 MILLIGRAM(S): 25 TABLET, FILM COATED ORAL at 09:55

## 2019-01-24 RX ADMIN — Medication 50 MILLIGRAM(S): at 21:36

## 2019-01-24 RX ADMIN — METHADONE HYDROCHLORIDE 20 MILLIGRAM(S): 40 TABLET ORAL at 09:55

## 2019-01-24 RX ADMIN — HALOPERIDOL DECANOATE 5 MILLIGRAM(S): 100 INJECTION INTRAMUSCULAR at 02:04

## 2019-01-24 RX ADMIN — Medication 2 GRAM(S): at 21:36

## 2019-01-24 RX ADMIN — OLANZAPINE 15 MILLIGRAM(S): 15 TABLET, FILM COATED ORAL at 21:38

## 2019-01-24 RX ADMIN — Medication 2 MILLIGRAM(S): at 09:56

## 2019-01-24 RX ADMIN — HALOPERIDOL DECANOATE 5 MILLIGRAM(S): 100 INJECTION INTRAMUSCULAR at 13:01

## 2019-01-24 RX ADMIN — HALOPERIDOL DECANOATE 5 MILLIGRAM(S): 100 INJECTION INTRAMUSCULAR at 21:37

## 2019-01-24 RX ADMIN — Medication 0.5 MILLIGRAM(S): at 13:01

## 2019-01-24 RX ADMIN — OLANZAPINE 15 MILLIGRAM(S): 15 TABLET, FILM COATED ORAL at 09:55

## 2019-01-24 RX ADMIN — Medication 50 MILLIGRAM(S): at 02:04

## 2019-01-24 RX ADMIN — Medication 2 GRAM(S): at 09:55

## 2019-01-24 RX ADMIN — Medication 2 MILLIGRAM(S): at 21:36

## 2019-01-24 RX ADMIN — Medication 50 MILLIGRAM(S): at 17:52

## 2019-01-24 RX ADMIN — Medication 2 MILLIGRAM(S): at 17:52

## 2019-01-24 RX ADMIN — Medication 0.5 MILLIGRAM(S): at 21:36

## 2019-01-24 RX ADMIN — Medication 2 MILLIGRAM(S): at 06:44

## 2019-01-24 RX ADMIN — Medication 0.5 MILLIGRAM(S): at 06:44

## 2019-01-24 RX ADMIN — Medication 50 MILLIGRAM(S): at 03:25
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
## 2019-01-24 NOTE — PROGRESS NOTE BEHAVIORAL HEALTH - NSBHFUPINTERVALCCFT_PSY_A_CORE
"I still think about going for long treatment"   Not focused on Suboxone.  Anxious but guarded and vague.

## 2019-01-24 NOTE — PROGRESS NOTE BEHAVIORAL HEALTH - NSBHCHARTREVIEWVS_PSY_A_CORE FT
Vital Signs Last 24 Hrs  T(C): 36.8 (23 Jan 2019 16:19), Max: 36.8 (23 Jan 2019 09:50)  T(F): 98.2 (23 Jan 2019 16:19), Max: 98.2 (23 Jan 2019 09:50)  HR: 69 (23 Jan 2019 16:19) (69 - 79)  BP: 113/75 (23 Jan 2019 16:19) (113/75 - 121/84)  BP(mean): --  RR: 18 (23 Jan 2019 16:19) (18 - 18)  SpO2: --

## 2019-01-25 PROCEDURE — 99232 SBSQ HOSP IP/OBS MODERATE 35: CPT

## 2019-01-25 RX ORDER — OLANZAPINE 15 MG/1
15 TABLET, FILM COATED ORAL
Qty: 0 | Refills: 0 | Status: DISCONTINUED | OUTPATIENT
Start: 2019-01-25 | End: 2019-02-25

## 2019-01-25 RX ADMIN — OLANZAPINE 15 MILLIGRAM(S): 15 TABLET, FILM COATED ORAL at 22:22

## 2019-01-25 RX ADMIN — Medication 2 GRAM(S): at 22:22

## 2019-01-25 RX ADMIN — Medication 2 MILLIGRAM(S): at 22:22

## 2019-01-25 RX ADMIN — Medication 0.5 MILLIGRAM(S): at 14:08

## 2019-01-25 RX ADMIN — Medication 0.5 MILLIGRAM(S): at 07:02

## 2019-01-25 RX ADMIN — Medication 0.5 MILLIGRAM(S): at 22:21

## 2019-01-25 RX ADMIN — OLANZAPINE 15 MILLIGRAM(S): 15 TABLET, FILM COATED ORAL at 10:46

## 2019-01-25 RX ADMIN — SERTRALINE 50 MILLIGRAM(S): 25 TABLET, FILM COATED ORAL at 10:45

## 2019-01-25 RX ADMIN — Medication 2 GRAM(S): at 10:42

## 2019-01-25 RX ADMIN — Medication 2 MILLIGRAM(S): at 07:02

## 2019-01-25 RX ADMIN — METHADONE HYDROCHLORIDE 20 MILLIGRAM(S): 40 TABLET ORAL at 10:43

## 2019-01-25 RX ADMIN — HALOPERIDOL DECANOATE 5 MILLIGRAM(S): 100 INJECTION INTRAMUSCULAR at 10:45
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
## 2019-01-25 NOTE — PROGRESS NOTE BEHAVIORAL HEALTH - NSBHCHARTREVIEWVS_PSY_A_CORE FT
Vital Signs Last 24 Hrs  T(C): 37.1 (24 Jan 2019 16:18), Max: 37.1 (24 Jan 2019 16:18)  T(F): 98.8 (24 Jan 2019 16:18), Max: 98.8 (24 Jan 2019 16:18)  HR: 83 (24 Jan 2019 16:18) (76 - 83)  BP: 124/77 (24 Jan 2019 16:18) (124/77 - 126/84)  BP(mean): --  RR: 18 (24 Jan 2019 16:18) (18 - 20)  SpO2: --

## 2019-01-26 RX ADMIN — Medication 2 MILLIGRAM(S): at 07:14

## 2019-01-26 RX ADMIN — SERTRALINE 50 MILLIGRAM(S): 25 TABLET, FILM COATED ORAL at 10:51

## 2019-01-26 RX ADMIN — Medication 0.5 MILLIGRAM(S): at 21:34

## 2019-01-26 RX ADMIN — Medication 0.5 MILLIGRAM(S): at 13:54

## 2019-01-26 RX ADMIN — Medication 50 MILLIGRAM(S): at 21:34

## 2019-01-26 RX ADMIN — Medication 0.5 MILLIGRAM(S): at 07:14

## 2019-01-26 RX ADMIN — OLANZAPINE 15 MILLIGRAM(S): 15 TABLET, FILM COATED ORAL at 10:50

## 2019-01-26 RX ADMIN — OLANZAPINE 15 MILLIGRAM(S): 15 TABLET, FILM COATED ORAL at 21:33

## 2019-01-26 RX ADMIN — Medication 2 GRAM(S): at 22:50

## 2019-01-26 RX ADMIN — Medication 2 MILLIGRAM(S): at 21:34

## 2019-01-26 RX ADMIN — Medication 2 GRAM(S): at 10:50

## 2019-01-26 RX ADMIN — METHADONE HYDROCHLORIDE 20 MILLIGRAM(S): 40 TABLET ORAL at 10:50

## 2019-01-27 RX ADMIN — HALOPERIDOL DECANOATE 5 MILLIGRAM(S): 100 INJECTION INTRAMUSCULAR at 14:51

## 2019-01-27 RX ADMIN — SERTRALINE 50 MILLIGRAM(S): 25 TABLET, FILM COATED ORAL at 10:22

## 2019-01-27 RX ADMIN — METHADONE HYDROCHLORIDE 20 MILLIGRAM(S): 40 TABLET ORAL at 10:21

## 2019-01-27 RX ADMIN — Medication 50 MILLIGRAM(S): at 21:40

## 2019-01-27 RX ADMIN — Medication 2 GRAM(S): at 10:21

## 2019-01-27 RX ADMIN — Medication 2 MILLIGRAM(S): at 13:07

## 2019-01-27 RX ADMIN — Medication 50 MILLIGRAM(S): at 18:00

## 2019-01-27 RX ADMIN — Medication 0.5 MILLIGRAM(S): at 21:39

## 2019-01-27 RX ADMIN — Medication 2 MILLIGRAM(S): at 07:32

## 2019-01-27 RX ADMIN — Medication 0.5 MILLIGRAM(S): at 13:07

## 2019-01-27 RX ADMIN — Medication 0.5 MILLIGRAM(S): at 07:15

## 2019-01-27 RX ADMIN — OLANZAPINE 15 MILLIGRAM(S): 15 TABLET, FILM COATED ORAL at 21:39

## 2019-01-27 RX ADMIN — HALOPERIDOL DECANOATE 5 MILLIGRAM(S): 100 INJECTION INTRAMUSCULAR at 21:40

## 2019-01-27 RX ADMIN — Medication 2 MILLIGRAM(S): at 21:39

## 2019-01-27 RX ADMIN — OLANZAPINE 15 MILLIGRAM(S): 15 TABLET, FILM COATED ORAL at 10:21

## 2019-01-27 RX ADMIN — Medication 2 GRAM(S): at 21:39

## 2019-01-28 PROCEDURE — 90853 GROUP PSYCHOTHERAPY: CPT

## 2019-01-28 PROCEDURE — 99232 SBSQ HOSP IP/OBS MODERATE 35: CPT

## 2019-01-28 RX ADMIN — Medication 0.5 MILLIGRAM(S): at 06:53

## 2019-01-28 RX ADMIN — METHADONE HYDROCHLORIDE 20 MILLIGRAM(S): 40 TABLET ORAL at 10:38

## 2019-01-28 RX ADMIN — Medication 2 MILLIGRAM(S): at 11:45

## 2019-01-28 RX ADMIN — Medication 0.5 MILLIGRAM(S): at 14:13

## 2019-01-28 RX ADMIN — Medication 2 MILLIGRAM(S): at 14:13

## 2019-01-28 RX ADMIN — HALOPERIDOL DECANOATE 5 MILLIGRAM(S): 100 INJECTION INTRAMUSCULAR at 22:30

## 2019-01-28 RX ADMIN — SERTRALINE 50 MILLIGRAM(S): 25 TABLET, FILM COATED ORAL at 10:38

## 2019-01-28 RX ADMIN — Medication 2 GRAM(S): at 10:38

## 2019-01-28 RX ADMIN — Medication 2 GRAM(S): at 22:33

## 2019-01-28 RX ADMIN — OLANZAPINE 15 MILLIGRAM(S): 15 TABLET, FILM COATED ORAL at 22:33

## 2019-01-28 RX ADMIN — Medication 0.5 MILLIGRAM(S): at 22:32

## 2019-01-28 RX ADMIN — HALOPERIDOL DECANOATE 5 MILLIGRAM(S): 100 INJECTION INTRAMUSCULAR at 15:52

## 2019-01-28 RX ADMIN — OLANZAPINE 15 MILLIGRAM(S): 15 TABLET, FILM COATED ORAL at 10:38
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
## 2019-01-28 NOTE — CHART NOTE - NSCHARTNOTEFT_GEN_A_CORE
Date of Service  1/28/2019  Group Name: Self-Other Group                     	Number of Attendees: 10  Duration 45 minutes  Diagnosis Code: F25.1  DOCUMENTATION OF PARTICIPATION AND RESPONSE OF INDIVIDUAL TO GROUP TREATMENT  Behavior in Group (check all that apply):  ? Showed insight             ?X Active in discussion            ? Offered constructive input         ? No apparent interest  ?X Showed interest           ? Quietly engaged                ? Supportive to others                  ? Appeared distracted  ? Showed leadership        ? Withdrawn                       ?Not supportive to others            ? Disruptive  Individual’s Mood:    ?Stable     ?Depressed/Sad     ?XAnxious     ?Angry     ?Other:   Risk Assessment  	X? None Reported or Observed   ? Danger to Self:  ? Ideation ? Plan  ? Intent  ? Attempt       ? Danger to Others:  ? Ideation ? Plan  ? Intent  ? Attempt   Describe in detail (cont. below as needed):    Goal(s)/Objective(s) Addressed:  Assessment, Decrease symptoms, Improve level of independent functioning, improve social/vocational/coping skills, Improve interpersonal relationships  Intervention(s) / Method(s) Provided: Stress management and coping skills, social skills was addressed  Self and Others Group:  Discussed relationships  Response to Intervention(s) and Progress Toward Goals and Objectives:Pt was engaged, and discussed that he needs to build a healthy relationship with his self before he does with others.  Plan/Additional Information (any recommendations or determinations for continued or revised treatment): Continue attending group on Mondays and Fridays   Completed By - Print Staff Name/Credentials:  Lev Atwood, PhD / Staff Psychologist	CPT Code 72916  Start Time 10:45		  Stop Time  11:30	   Duration in Minutes 45 minutes

## 2019-01-28 NOTE — PROGRESS NOTE BEHAVIORAL HEALTH - NSBHFUPINTERVALHXFT_PSY_A_CORE
Per staff, pt is visible, in good behavioral control, .  continues  visible on the unit, socializing with peers,  no acute changes in pain.  No evidence of pt responding to internal stimuli.  Attends select groups. No evidence of irritability.  No acute suicidality at this time. +paranoid ideation  Plan for long-term hospitalization in view of fragility and chronic paranoid ideation and depressed mood  with significant anxiety.   Communicating with Dr. Chung regarding application.

## 2019-01-28 NOTE — PROGRESS NOTE BEHAVIORAL HEALTH - NSBHFUPINTERVALCCFT_PSY_A_CORE
Talks about raising Methadone for leg pain despite discussion that no evidence of structural injury and no evidence of impairment.  Smiles inappropriately .

## 2019-01-29 PROCEDURE — 99232 SBSQ HOSP IP/OBS MODERATE 35: CPT

## 2019-01-29 RX ORDER — CLONAZEPAM 1 MG
0.5 TABLET ORAL EVERY 8 HOURS
Qty: 0 | Refills: 0 | Status: DISCONTINUED | OUTPATIENT
Start: 2019-01-29 | End: 2019-01-29

## 2019-01-29 RX ORDER — CLONAZEPAM 1 MG
0.5 TABLET ORAL EVERY 8 HOURS
Qty: 0 | Refills: 0 | Status: DISCONTINUED | OUTPATIENT
Start: 2019-01-29 | End: 2019-02-04

## 2019-01-29 RX ORDER — METHADONE HYDROCHLORIDE 40 MG/1
20 TABLET ORAL DAILY
Qty: 0 | Refills: 0 | Status: DISCONTINUED | OUTPATIENT
Start: 2019-01-29 | End: 2019-02-01

## 2019-01-29 RX ORDER — ACETAMINOPHEN 500 MG
650 TABLET ORAL EVERY 6 HOURS
Qty: 0 | Refills: 0 | Status: DISCONTINUED | OUTPATIENT
Start: 2019-01-29 | End: 2019-02-28

## 2019-01-29 RX ADMIN — Medication 2 MILLIGRAM(S): at 14:58

## 2019-01-29 RX ADMIN — Medication 50 MILLIGRAM(S): at 21:39

## 2019-01-29 RX ADMIN — Medication 0.5 MILLIGRAM(S): at 21:39

## 2019-01-29 RX ADMIN — Medication 2 GRAM(S): at 10:15

## 2019-01-29 RX ADMIN — Medication 0.5 MILLIGRAM(S): at 16:25

## 2019-01-29 RX ADMIN — Medication 2 GRAM(S): at 21:39

## 2019-01-29 RX ADMIN — HALOPERIDOL DECANOATE 5 MILLIGRAM(S): 100 INJECTION INTRAMUSCULAR at 12:16

## 2019-01-29 RX ADMIN — Medication 2 MILLIGRAM(S): at 16:26

## 2019-01-29 RX ADMIN — Medication 0.5 MILLIGRAM(S): at 06:22

## 2019-01-29 RX ADMIN — SERTRALINE 50 MILLIGRAM(S): 25 TABLET, FILM COATED ORAL at 10:15

## 2019-01-29 RX ADMIN — Medication 2 MILLIGRAM(S): at 12:17

## 2019-01-29 RX ADMIN — OLANZAPINE 15 MILLIGRAM(S): 15 TABLET, FILM COATED ORAL at 21:39

## 2019-01-29 RX ADMIN — Medication 2 MILLIGRAM(S): at 21:40

## 2019-01-29 RX ADMIN — METHADONE HYDROCHLORIDE 20 MILLIGRAM(S): 40 TABLET ORAL at 10:15

## 2019-01-29 RX ADMIN — OLANZAPINE 15 MILLIGRAM(S): 15 TABLET, FILM COATED ORAL at 10:15

## 2019-01-29 NOTE — PROGRESS NOTE BEHAVIORAL HEALTH - NSBHPTASSESSDT_PSY_A_CORE
29-Jan-2019 08:43 AUTHORIZATION FOR SURGICAL OPERATION OR OTHER PROCEDURE    1. I hereby authorize Dr. Delilah Mcdaniels, and [facility] staff assigned to my case to perform the following operation and/or procedure at the [facility]:    Colposcopy    2.   My physician timi Patient:           []  Parent    Responsible person                          []  Spouse  In case of minor or                    [] Other  _____________   Incompetent name:  __________________________________________________                               (p

## 2019-01-29 NOTE — PROGRESS NOTE BEHAVIORAL HEALTH - NSBHCHARTREVIEWVS_PSY_A_CORE FT
Vital Signs Last 24 Hrs  T(C): 36.6 (29 Jan 2019 09:00), Max: 36.7 (28 Jan 2019 16:06)  T(F): 97.8 (29 Jan 2019 09:00), Max: 98 (28 Jan 2019 16:06)  HR: 70 (29 Jan 2019 09:00) (70 - 74)  BP: 132/90 (29 Jan 2019 09:00) (119/74 - 132/90)  BP(mean): --  RR: 70 (29 Jan 2019 09:00) (18 - 70)  SpO2: --
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
## 2019-01-29 NOTE — PROGRESS NOTE BEHAVIORAL HEALTH - NSBHFUPINTERVALHXFT_PSY_A_CORE
visible,and  in good behavioral control, .  continues  visible on the unit, socializing with peers,  no acute changes in pain.  No evidence of pt responding to internal stimuli.  Attends select groups. No evidence of irritability.  No acute suicidality at this time. +paranoid ideation  Plan for long-term hospitalization in view of fragility and chronic paranoid ideation and depressed mood  with significant anxiety.   awaiting serum Zyprexa.

## 2019-01-30 PROCEDURE — 99232 SBSQ HOSP IP/OBS MODERATE 35: CPT

## 2019-01-30 RX ADMIN — SERTRALINE 50 MILLIGRAM(S): 25 TABLET, FILM COATED ORAL at 10:25

## 2019-01-30 RX ADMIN — Medication 50 MILLIGRAM(S): at 21:24

## 2019-01-30 RX ADMIN — Medication 0.5 MILLIGRAM(S): at 14:36

## 2019-01-30 RX ADMIN — Medication 0.5 MILLIGRAM(S): at 06:48

## 2019-01-30 RX ADMIN — OLANZAPINE 15 MILLIGRAM(S): 15 TABLET, FILM COATED ORAL at 10:25

## 2019-01-30 RX ADMIN — Medication 2 MILLIGRAM(S): at 10:26

## 2019-01-30 RX ADMIN — Medication 0.5 MILLIGRAM(S): at 21:25

## 2019-01-30 RX ADMIN — Medication 2 GRAM(S): at 10:24

## 2019-01-30 RX ADMIN — Medication 2 MILLIGRAM(S): at 21:24

## 2019-01-30 RX ADMIN — Medication 2 GRAM(S): at 21:25

## 2019-01-30 RX ADMIN — OLANZAPINE 15 MILLIGRAM(S): 15 TABLET, FILM COATED ORAL at 21:25

## 2019-01-30 RX ADMIN — METHADONE HYDROCHLORIDE 20 MILLIGRAM(S): 40 TABLET ORAL at 10:24

## 2019-01-30 NOTE — PROGRESS NOTE BEHAVIORAL HEALTH - NSBHCHARTREVIEWVS_PSY_A_CORE FT
Vital Signs Last 24 Hrs  T(C): 36.4 (29 Jan 2019 16:21), Max: 36.6 (29 Jan 2019 09:00)  T(F): 97.6 (29 Jan 2019 16:21), Max: 97.8 (29 Jan 2019 09:00)  HR: 83 (29 Jan 2019 16:21) (70 - 83)  BP: 125/77 (29 Jan 2019 16:21) (125/77 - 132/90)  BP(mean): --  RR: 18 (29 Jan 2019 16:21) (18 - 70)  SpO2: --

## 2019-01-30 NOTE — PROGRESS NOTE BEHAVIORAL HEALTH - NSBHFUPINTERVALCCFT_PSY_A_CORE
"You walk away from me;  am I not handsome? "  Later "The [place] are taking over you know that!"   hard to follow.
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
## 2019-01-31 PROCEDURE — 99231 SBSQ HOSP IP/OBS SF/LOW 25: CPT

## 2019-01-31 RX ADMIN — Medication 2 GRAM(S): at 21:34

## 2019-01-31 RX ADMIN — Medication 0.5 MILLIGRAM(S): at 21:34

## 2019-01-31 RX ADMIN — OLANZAPINE 15 MILLIGRAM(S): 15 TABLET, FILM COATED ORAL at 09:39

## 2019-01-31 RX ADMIN — Medication 2 MILLIGRAM(S): at 09:41

## 2019-01-31 RX ADMIN — Medication 0.5 MILLIGRAM(S): at 16:36

## 2019-01-31 RX ADMIN — Medication 0.5 MILLIGRAM(S): at 07:14

## 2019-01-31 RX ADMIN — OLANZAPINE 15 MILLIGRAM(S): 15 TABLET, FILM COATED ORAL at 21:34

## 2019-01-31 RX ADMIN — METHADONE HYDROCHLORIDE 20 MILLIGRAM(S): 40 TABLET ORAL at 09:40

## 2019-01-31 RX ADMIN — HALOPERIDOL DECANOATE 5 MILLIGRAM(S): 100 INJECTION INTRAMUSCULAR at 11:37

## 2019-01-31 RX ADMIN — Medication 2 MILLIGRAM(S): at 16:36

## 2019-01-31 RX ADMIN — Medication 2 GRAM(S): at 11:36

## 2019-01-31 RX ADMIN — SERTRALINE 50 MILLIGRAM(S): 25 TABLET, FILM COATED ORAL at 09:39

## 2019-01-31 RX ADMIN — Medication 2 MILLIGRAM(S): at 21:35

## 2019-01-31 RX ADMIN — HALOPERIDOL DECANOATE 5 MILLIGRAM(S): 100 INJECTION INTRAMUSCULAR at 21:35
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
## 2019-01-31 NOTE — PROGRESS NOTE BEHAVIORAL HEALTH - NSBHADMITCOUNSEL_PSY_A_CORE
risks and benefits of treatment options/risk factor reduction/importance of adherence to chosen treatment
diagnostic results/impressions and/or recommended studies/risks and benefits of treatment options/importance of adherence to chosen treatment/instructions for management, treatment and follow up
risks and benefits of treatment options/importance of adherence to chosen treatment/risk factor reduction
importance of adherence to chosen treatment/diagnostic results/impressions and/or recommended studies/risks and benefits of treatment options/instructions for management, treatment and follow up
risks and benefits of treatment options/prognosis/instructions for management, treatment and follow up
diagnostic results/impressions and/or recommended studies/importance of adherence to chosen treatment/risks and benefits of treatment options/instructions for management, treatment and follow up
diagnostic results/impressions and/or recommended studies/importance of adherence to chosen treatment/risks and benefits of treatment options/instructions for management, treatment and follow up
instructions for management, treatment and follow up/diagnostic results/impressions and/or recommended studies/risks and benefits of treatment options/importance of adherence to chosen treatment
importance of adherence to chosen treatment/risks and benefits of treatment options/instructions for management, treatment and follow up/diagnostic results/impressions and/or recommended studies
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
## 2019-01-31 NOTE — PROGRESS NOTE BEHAVIORAL HEALTH - NSBHFUPINTERVALCCFT_PSY_A_CORE
"You walk away from me;  am I not handsome? "  Later "The [place] are taking over you know that!"   hard to follow. "I am feeling good, still having some paranoia"

## 2019-01-31 NOTE — PROGRESS NOTE BEHAVIORAL HEALTH - NSBHCHARTREVIEWVS_PSY_A_CORE FT
ICU Vital Signs Last 24 Hrs  T(C): 37.1 (30 Jan 2019 16:48), Max: 37.1 (30 Jan 2019 16:48)  T(F): 98.7 (30 Jan 2019 16:48), Max: 98.7 (30 Jan 2019 16:48)  HR: 74 (30 Jan 2019 16:48) (74 - 74)  BP: 114/75 (30 Jan 2019 16:48) (114/75 - 114/75)  BP(mean): --  ABP: --  ABP(mean): --  RR: 18 (30 Jan 2019 16:48) (18 - 18)  SpO2: -- ICU Vital Signs Last 24 Hrs  T(C): 36.8 (31 Jan 2019 09:00), Max: 37.1 (30 Jan 2019 16:48)  T(F): 98.2 (31 Jan 2019 09:00), Max: 98.7 (30 Jan 2019 16:48)  HR: 75 (31 Jan 2019 09:00) (74 - 75)  BP: 118/80 (31 Jan 2019 09:00) (114/75 - 118/80)  BP(mean): --  ABP: --  ABP(mean): --  RR: 18 (31 Jan 2019 09:00) (18 - 18)  SpO2: --

## 2019-01-31 NOTE — PROGRESS NOTE BEHAVIORAL HEALTH - NSBHCHARTREVIEWVS_PSY_A_CORE FT
ICU Vital Signs Last 24 Hrs  T(C): 36.8 (31 Jan 2019 09:00), Max: 37.1 (30 Jan 2019 16:48)  T(F): 98.2 (31 Jan 2019 09:00), Max: 98.7 (30 Jan 2019 16:48)  HR: 75 (31 Jan 2019 09:00) (74 - 75)  BP: 118/80 (31 Jan 2019 09:00) (114/75 - 118/80)  BP(mean): --  ABP: --  ABP(mean): --  RR: 18 (31 Jan 2019 09:00) (18 - 18)  SpO2: --

## 2019-01-31 NOTE — PROGRESS NOTE BEHAVIORAL HEALTH - NSBHADMITCOORDCAREOTHER_PSY_A_CORE FT
nursing, [employer]
nursing, [employer]
nursing, [employer]

## 2019-01-31 NOTE — PROGRESS NOTE BEHAVIORAL HEALTH - ADDITIONAL DETAILS / COMMENTS
Vaguely states that he feels paranoid at times
Pt appears paranoid that staff and other Pts are speaking and laughing about him.
Vaguely states that he feels paranoid at times
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
## 2019-01-31 NOTE — PROGRESS NOTE BEHAVIORAL HEALTH - NSBHFUPINTERVALHXFT_PSY_A_CORE
Per staff, pt has a full range of affect, maintaining gains, is social with peers, paranoid and visible on the unit.  Patient is visible, in good behavioral control, socializing with peers, attending groups. Reports no acute changes in pain. Patient tells staff he is feeling "really good, still feeling paranoid but that is constant." No evidence of irritability noted. Denies side effects of medications.  States that he likes taking prn haldol in the afternoon bc it helps keep him calm.  No SI, HI, AH, VH at this time.  Does not appear to be responding to internal stimuli.
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
## 2019-01-31 NOTE — PROGRESS NOTE BEHAVIORAL HEALTH - NSBHFUPINTERVALHXFT_PSY_A_CORE
visible,and  in good behavioral control, .  continues  visible on the unit, socializing with peers,  no acute changes in pain.  No evidence of pt responding to internal stimuli.  Attends select groups. No evidence of irritability.  No acute suicidality at this time. +paranoid ideation  Plan for long-term hospitalization in view of fragility and chronic paranoid ideation and depressed mood  with significant anxiety.   awaiting serum Zyprexa. Patient is visible, in good behavioral control, socializing with peers, attending groups. Reports no acute changes in pain. Patient tells staff he is feeling "really good, still feeling paranoid but that is constant." No evidence of irritability noted. Denies side effects of medications. No SI, HI, A/V H at this time. Per staff, pt has a full range of affect, maintaining gains, is social with peers, paranoid and visible on the unit.  Patient is visible, in good behavioral control, socializing with peers, attending groups. Reports no acute changes in pain. Patient tells staff he is feeling "really good, still feeling paranoid but that is constant." No evidence of irritability noted. Denies side effects of medications.  States that he likes taking prn haldol in the afternoon bc it helps keep him calm.  No SI, HI, AH, VH at this time.  Does not appear to be responding to internal stimuli.

## 2019-01-31 NOTE — PROGRESS NOTE BEHAVIORAL HEALTH - PRN MEDS
1-30-19:  - hydroxyzine hydrochloride 50 mg 21:24  - nicotine polacrilex gum 2 mg 10:26, 21:24  1-31-19:  - nicotine polacrilex gum 2 mg 9:41

## 2019-02-01 PROCEDURE — 99232 SBSQ HOSP IP/OBS MODERATE 35: CPT

## 2019-02-01 RX ORDER — TOPIRAMATE 25 MG
50 TABLET ORAL DAILY
Qty: 0 | Refills: 0 | Status: DISCONTINUED | OUTPATIENT
Start: 2019-02-01 | End: 2019-03-04

## 2019-02-01 RX ORDER — METHADONE HYDROCHLORIDE 40 MG/1
20 TABLET ORAL DAILY
Qty: 0 | Refills: 0 | Status: DISCONTINUED | OUTPATIENT
Start: 2019-02-01 | End: 2019-02-06

## 2019-02-01 RX ADMIN — OLANZAPINE 15 MILLIGRAM(S): 15 TABLET, FILM COATED ORAL at 10:14

## 2019-02-01 RX ADMIN — SERTRALINE 50 MILLIGRAM(S): 25 TABLET, FILM COATED ORAL at 10:14

## 2019-02-01 RX ADMIN — Medication 0.5 MILLIGRAM(S): at 06:42

## 2019-02-01 RX ADMIN — OLANZAPINE 15 MILLIGRAM(S): 15 TABLET, FILM COATED ORAL at 21:13

## 2019-02-01 RX ADMIN — Medication 0.5 MILLIGRAM(S): at 21:12

## 2019-02-01 RX ADMIN — Medication 2 MILLIGRAM(S): at 21:13

## 2019-02-01 RX ADMIN — METHADONE HYDROCHLORIDE 20 MILLIGRAM(S): 40 TABLET ORAL at 10:14

## 2019-02-01 RX ADMIN — HALOPERIDOL DECANOATE 5 MILLIGRAM(S): 100 INJECTION INTRAMUSCULAR at 21:15

## 2019-02-01 RX ADMIN — Medication 2 GRAM(S): at 10:14

## 2019-02-01 RX ADMIN — Medication 2 GRAM(S): at 21:16

## 2019-02-01 RX ADMIN — Medication 0.5 MILLIGRAM(S): at 13:47

## 2019-02-01 NOTE — PROGRESS NOTE BEHAVIORAL HEALTH - NSBHCHARTREVIEWVS_PSY_A_CORE FT
Vital Signs Last 24 Hrs  T(C): 36.7 (01 Feb 2019 10:00), Max: 36.7 (01 Feb 2019 10:00)  T(F): 98 (01 Feb 2019 10:00), Max: 98 (01 Feb 2019 10:00)  HR: 69 (01 Feb 2019 10:00) (69 - 77)  BP: 124/81 (01 Feb 2019 10:00) (124/81 - 129/82)  BP(mean): --  RR: 17 (01 Feb 2019 10:00) (17 - 18)  SpO2: --
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
## 2019-02-02 RX ADMIN — OLANZAPINE 15 MILLIGRAM(S): 15 TABLET, FILM COATED ORAL at 22:02

## 2019-02-02 RX ADMIN — Medication 50 MILLIGRAM(S): at 10:14

## 2019-02-02 RX ADMIN — Medication 0.5 MILLIGRAM(S): at 13:53

## 2019-02-02 RX ADMIN — SERTRALINE 50 MILLIGRAM(S): 25 TABLET, FILM COATED ORAL at 10:14

## 2019-02-02 RX ADMIN — Medication 50 MILLIGRAM(S): at 22:01

## 2019-02-02 RX ADMIN — Medication 2 GRAM(S): at 22:02

## 2019-02-02 RX ADMIN — Medication 2 GRAM(S): at 10:12

## 2019-02-02 RX ADMIN — Medication 2 MILLIGRAM(S): at 22:02

## 2019-02-02 RX ADMIN — Medication 0.5 MILLIGRAM(S): at 22:02

## 2019-02-02 RX ADMIN — OLANZAPINE 15 MILLIGRAM(S): 15 TABLET, FILM COATED ORAL at 10:12

## 2019-02-02 RX ADMIN — METHADONE HYDROCHLORIDE 20 MILLIGRAM(S): 40 TABLET ORAL at 10:14

## 2019-02-02 RX ADMIN — Medication 0.5 MILLIGRAM(S): at 06:24

## 2019-02-03 RX ADMIN — Medication 2 GRAM(S): at 21:50

## 2019-02-03 RX ADMIN — Medication 2 GRAM(S): at 10:02

## 2019-02-03 RX ADMIN — OLANZAPINE 15 MILLIGRAM(S): 15 TABLET, FILM COATED ORAL at 10:02

## 2019-02-03 RX ADMIN — Medication 0.5 MILLIGRAM(S): at 07:21

## 2019-02-03 RX ADMIN — SERTRALINE 50 MILLIGRAM(S): 25 TABLET, FILM COATED ORAL at 10:02

## 2019-02-03 RX ADMIN — Medication 2 MILLIGRAM(S): at 11:38

## 2019-02-03 RX ADMIN — Medication 50 MILLIGRAM(S): at 10:02

## 2019-02-03 RX ADMIN — Medication 50 MILLIGRAM(S): at 21:51

## 2019-02-03 RX ADMIN — METHADONE HYDROCHLORIDE 20 MILLIGRAM(S): 40 TABLET ORAL at 10:02

## 2019-02-03 RX ADMIN — Medication 0.5 MILLIGRAM(S): at 21:50

## 2019-02-03 RX ADMIN — Medication 2 MILLIGRAM(S): at 21:51

## 2019-02-03 RX ADMIN — OLANZAPINE 15 MILLIGRAM(S): 15 TABLET, FILM COATED ORAL at 21:50

## 2019-02-03 RX ADMIN — Medication 0.5 MILLIGRAM(S): at 16:32

## 2019-02-03 RX ADMIN — Medication 2 MILLIGRAM(S): at 16:32

## 2019-02-04 PROCEDURE — 90853 GROUP PSYCHOTHERAPY: CPT

## 2019-02-04 PROCEDURE — 99232 SBSQ HOSP IP/OBS MODERATE 35: CPT

## 2019-02-04 RX ORDER — CLONAZEPAM 1 MG
0.5 TABLET ORAL EVERY 8 HOURS
Qty: 0 | Refills: 0 | Status: DISCONTINUED | OUTPATIENT
Start: 2019-02-04 | End: 2019-02-11

## 2019-02-04 RX ORDER — OMEGA-3 ACID ETHYL ESTERS 1 G
2 CAPSULE ORAL
Qty: 0 | Refills: 0 | Status: DISCONTINUED | OUTPATIENT
Start: 2019-02-04 | End: 2019-03-06

## 2019-02-04 RX ADMIN — Medication 2 MILLIGRAM(S): at 22:01

## 2019-02-04 RX ADMIN — Medication 0.5 MILLIGRAM(S): at 07:21

## 2019-02-04 RX ADMIN — Medication 2 GRAM(S): at 22:01

## 2019-02-04 RX ADMIN — Medication 50 MILLIGRAM(S): at 22:01

## 2019-02-04 RX ADMIN — Medication 0.5 MILLIGRAM(S): at 14:11

## 2019-02-04 RX ADMIN — Medication 2 GRAM(S): at 10:45

## 2019-02-04 RX ADMIN — OLANZAPINE 15 MILLIGRAM(S): 15 TABLET, FILM COATED ORAL at 22:01

## 2019-02-04 RX ADMIN — Medication 50 MILLIGRAM(S): at 10:45

## 2019-02-04 RX ADMIN — METHADONE HYDROCHLORIDE 20 MILLIGRAM(S): 40 TABLET ORAL at 10:46

## 2019-02-04 RX ADMIN — Medication 50 MILLIGRAM(S): at 17:12

## 2019-02-04 RX ADMIN — Medication 0.5 MILLIGRAM(S): at 22:01

## 2019-02-04 RX ADMIN — Medication 2 MILLIGRAM(S): at 10:46

## 2019-02-04 RX ADMIN — Medication 2 MILLIGRAM(S): at 14:11

## 2019-02-04 RX ADMIN — OLANZAPINE 15 MILLIGRAM(S): 15 TABLET, FILM COATED ORAL at 10:45

## 2019-02-04 RX ADMIN — SERTRALINE 50 MILLIGRAM(S): 25 TABLET, FILM COATED ORAL at 10:45

## 2019-02-04 NOTE — CHART NOTE - NSCHARTNOTEFT_GEN_A_CORE
Name: Jose Sánchez  Date of Service  2/4/2019  Group Name: Self-Other Group                     	Number of Attendees: 3  Duration 45 minutes  Diagnosis Code: F25.1  DOCUMENTATION OF PARTICIPATION AND RESPONSE OF INDIVIDUAL TO GROUP TREATMENT  Behavior in Group (check all that apply):  ? Showed insight             ? XActive in discussion            ? XOffered constructive input         ? No apparent interest  ?X Showed interest           ? Quietly engaged                ? Supportive to others                  ? Appeared distracted  ? Showed leadership        ? Withdrawn                       ?Not supportive to others            ? Disruptive  Individual’s Mood:    ?Stable     ?XDepressed/Sad     ?Anxious     ?Angry     ?Other  Risk Assessment  	X? None Reported or Observed   ? Danger to Self:  ? Ideation ? Plan  ? Intent  ? Attempt       ? Danger to Others:  ? Ideation ? Plan  ? Intent  ? Attempt   Describe in detail (cont. below as needed):    Goal(s)/Objective(s) Addressed:  Assessment, Decrease symptoms, Improve level of independent functioning, improve social/vocational/coping skills, Improve interpersonal relationships  Intervention(s) / Method(s) Provided: Stress management and coping skills, interpersonal effectiveness skills were addressed  Self and Others Group:  Discussing a relationship that pt would like to work on and get feedback from the group.  Response to Intervention(s) and Progress Toward Goals and Objectives: Pt engaged and discussed his relationship with his sister and mother. He discussed his desire to be more present in his family’s life.   Plan/Additional Information (any recommendations or determinations for continued or revised treatment): Continue attending group on Mondays and Fridays   Completed By - Print Staff Name/Credentials:  Lev Atwood, PhD / Staff Psychologist	CPT Code 69615  Start Time 10:45		  Stop Time  11:40	   Duration in Minutes 45 minutes
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
## 2019-02-04 NOTE — PROGRESS NOTE BEHAVIORAL HEALTH - NSBHCHARTREVIEWVS_PSY_A_CORE FT
Vital Signs Last 24 Hrs  T(C): 36.8 (03 Feb 2019 17:00), Max: 36.8 (03 Feb 2019 17:00)  T(F): 98.2 (03 Feb 2019 17:00), Max: 98.2 (03 Feb 2019 17:00)  HR: 79 (03 Feb 2019 17:00) (76 - 79)  BP: 108/70 (03 Feb 2019 17:00) (108/70 - 117/79)  BP(mean): --  RR: 18 (03 Feb 2019 17:00) (18 - 20)  SpO2: --

## 2019-02-05 PROCEDURE — 99232 SBSQ HOSP IP/OBS MODERATE 35: CPT

## 2019-02-05 RX ORDER — LIDOCAINE 4 G/100G
1 CREAM TOPICAL DAILY
Qty: 0 | Refills: 0 | Status: DISCONTINUED | OUTPATIENT
Start: 2019-02-05 | End: 2019-03-06

## 2019-02-05 RX ADMIN — SERTRALINE 50 MILLIGRAM(S): 25 TABLET, FILM COATED ORAL at 09:47

## 2019-02-05 RX ADMIN — Medication 2 GRAM(S): at 09:46

## 2019-02-05 RX ADMIN — OLANZAPINE 15 MILLIGRAM(S): 15 TABLET, FILM COATED ORAL at 09:46

## 2019-02-05 RX ADMIN — Medication 2 GRAM(S): at 21:22

## 2019-02-05 RX ADMIN — Medication 0.5 MILLIGRAM(S): at 14:09

## 2019-02-05 RX ADMIN — Medication 50 MILLIGRAM(S): at 21:22

## 2019-02-05 RX ADMIN — Medication 2 MILLIGRAM(S): at 21:22

## 2019-02-05 RX ADMIN — Medication 0.5 MILLIGRAM(S): at 21:22

## 2019-02-05 RX ADMIN — Medication 0.5 MILLIGRAM(S): at 06:48

## 2019-02-05 RX ADMIN — Medication 50 MILLIGRAM(S): at 09:46

## 2019-02-05 RX ADMIN — OLANZAPINE 15 MILLIGRAM(S): 15 TABLET, FILM COATED ORAL at 21:22

## 2019-02-05 RX ADMIN — Medication 2 MILLIGRAM(S): at 14:09

## 2019-02-05 RX ADMIN — METHADONE HYDROCHLORIDE 20 MILLIGRAM(S): 40 TABLET ORAL at 09:47
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
## 2019-02-05 NOTE — PROGRESS NOTE BEHAVIORAL HEALTH - NSBHFUPINTERVALCCFT_PSY_A_CORE
Talks about wanting something for his knee.  But walks about with no visible distress.  would consider lidocaine patient for knee; continues anxious; smiling inappropriately ; slightly elevated mood.

## 2019-02-05 NOTE — PROGRESS NOTE BEHAVIORAL HEALTH - NSBHCHARTREVIEWVS_PSY_A_CORE FT
Vital Signs Last 24 Hrs  T(C): 36.9 (04 Feb 2019 16:31), Max: 37.1 (04 Feb 2019 09:00)  T(F): 98.4 (04 Feb 2019 16:31), Max: 98.7 (04 Feb 2019 09:00)  HR: 81 (04 Feb 2019 16:31) (80 - 81)  BP: 109/75 (04 Feb 2019 16:31) (109/75 - 124/83)  BP(mean): --  RR: 18 (04 Feb 2019 16:31) (18 - 18)  SpO2: --
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
## 2019-02-06 PROCEDURE — 99232 SBSQ HOSP IP/OBS MODERATE 35: CPT

## 2019-02-06 RX ORDER — METHADONE HYDROCHLORIDE 40 MG/1
20 TABLET ORAL DAILY
Qty: 0 | Refills: 0 | Status: DISCONTINUED | OUTPATIENT
Start: 2019-02-06 | End: 2019-02-12

## 2019-02-06 RX ADMIN — METHADONE HYDROCHLORIDE 20 MILLIGRAM(S): 40 TABLET ORAL at 10:10

## 2019-02-06 RX ADMIN — Medication 50 MILLIGRAM(S): at 22:01

## 2019-02-06 RX ADMIN — Medication 2 GRAM(S): at 22:02

## 2019-02-06 RX ADMIN — Medication 50 MILLIGRAM(S): at 10:09

## 2019-02-06 RX ADMIN — Medication 0.5 MILLIGRAM(S): at 06:43

## 2019-02-06 RX ADMIN — Medication 2 MILLIGRAM(S): at 22:02

## 2019-02-06 RX ADMIN — OLANZAPINE 15 MILLIGRAM(S): 15 TABLET, FILM COATED ORAL at 22:02

## 2019-02-06 RX ADMIN — Medication 0.5 MILLIGRAM(S): at 22:01

## 2019-02-06 RX ADMIN — SERTRALINE 50 MILLIGRAM(S): 25 TABLET, FILM COATED ORAL at 10:10

## 2019-02-06 RX ADMIN — OLANZAPINE 15 MILLIGRAM(S): 15 TABLET, FILM COATED ORAL at 10:10

## 2019-02-06 RX ADMIN — Medication 2 GRAM(S): at 10:10
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
## 2019-02-06 NOTE — PROGRESS NOTE BEHAVIORAL HEALTH - NSBHFUPINTERVALHXFT_PSY_A_CORE
focused on entitlements; social security ;  awaiting Zyprexa levels;  attends most groups; anxious and suspicious but not hostile or irritable.

## 2019-02-06 NOTE — PROGRESS NOTE BEHAVIORAL HEALTH - NSBHCHARTREVIEWVS_PSY_A_CORE FT
Vital Signs Last 24 Hrs  T(C): 36.7 (05 Feb 2019 16:17), Max: 36.7 (05 Feb 2019 16:17)  T(F): 98.1 (05 Feb 2019 16:17), Max: 98.1 (05 Feb 2019 16:17)  HR: 74 (05 Feb 2019 16:17) (74 - 76)  BP: 120/77 (05 Feb 2019 16:17) (120/77 - 121/77)  BP(mean): --  RR: 18 (05 Feb 2019 16:17) (18 - 18)  SpO2: --

## 2019-02-07 PROCEDURE — 99232 SBSQ HOSP IP/OBS MODERATE 35: CPT

## 2019-02-07 RX ADMIN — Medication 2 GRAM(S): at 21:29

## 2019-02-07 RX ADMIN — Medication 2 MILLIGRAM(S): at 21:29

## 2019-02-07 RX ADMIN — OLANZAPINE 15 MILLIGRAM(S): 15 TABLET, FILM COATED ORAL at 21:29

## 2019-02-07 RX ADMIN — Medication 50 MILLIGRAM(S): at 21:30

## 2019-02-07 RX ADMIN — SERTRALINE 50 MILLIGRAM(S): 25 TABLET, FILM COATED ORAL at 09:58

## 2019-02-07 RX ADMIN — Medication 0.5 MILLIGRAM(S): at 19:17

## 2019-02-07 RX ADMIN — OLANZAPINE 15 MILLIGRAM(S): 15 TABLET, FILM COATED ORAL at 10:01

## 2019-02-07 RX ADMIN — METHADONE HYDROCHLORIDE 20 MILLIGRAM(S): 40 TABLET ORAL at 09:57

## 2019-02-07 RX ADMIN — Medication 0.5 MILLIGRAM(S): at 21:29

## 2019-02-07 RX ADMIN — Medication 0.5 MILLIGRAM(S): at 06:12

## 2019-02-07 RX ADMIN — Medication 50 MILLIGRAM(S): at 09:57

## 2019-02-07 RX ADMIN — Medication 2 GRAM(S): at 09:58

## 2019-02-07 RX ADMIN — Medication 100 MILLIGRAM(S): at 22:00

## 2019-02-07 NOTE — PROGRESS NOTE BEHAVIORAL HEALTH - NSBHFUPINTERVALHXFT_PSY_A_CORE
patient attends groups and walks about; noted to be a little less disorganized lately; no tremor; does not appear to be using lidocaine patch for knee;.  awaiting application for MPC and Zyprexa level.
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
## 2019-02-07 NOTE — PROGRESS NOTE BEHAVIORAL HEALTH - NSBHCHARTREVIEWVS_PSY_A_CORE FT
Vital Signs Last 24 Hrs  T(C): 36.7 (06 Feb 2019 16:09), Max: 36.7 (06 Feb 2019 16:09)  T(F): 98 (06 Feb 2019 16:09), Max: 98 (06 Feb 2019 16:09)  HR: 76 (06 Feb 2019 16:09) (67 - 76)  BP: 116/74 (06 Feb 2019 16:09) (116/74 - 121/85)  BP(mean): --  RR: 18 (06 Feb 2019 16:09) (18 - 20)  SpO2: --

## 2019-02-07 NOTE — PROGRESS NOTE BEHAVIORAL HEALTH - NSBHFUPINTERVALCCFT_PSY_A_CORE
no concerns;  stated he did blood work for Zyprexa level as ordered; staff reports that patient may want to decrease Methadone because of sedation .   "It was great!"  used lidocaine patch for knee pain/discomfort.

## 2019-02-08 PROCEDURE — 99232 SBSQ HOSP IP/OBS MODERATE 35: CPT

## 2019-02-08 RX ADMIN — Medication 2 MILLIGRAM(S): at 21:18

## 2019-02-08 RX ADMIN — SERTRALINE 50 MILLIGRAM(S): 25 TABLET, FILM COATED ORAL at 10:41

## 2019-02-08 RX ADMIN — Medication 50 MILLIGRAM(S): at 10:41

## 2019-02-08 RX ADMIN — Medication 0.5 MILLIGRAM(S): at 07:20

## 2019-02-08 RX ADMIN — Medication 2 GRAM(S): at 21:19

## 2019-02-08 RX ADMIN — OLANZAPINE 15 MILLIGRAM(S): 15 TABLET, FILM COATED ORAL at 10:41

## 2019-02-08 RX ADMIN — HALOPERIDOL DECANOATE 5 MILLIGRAM(S): 100 INJECTION INTRAMUSCULAR at 12:24

## 2019-02-08 RX ADMIN — Medication 2 GRAM(S): at 10:41

## 2019-02-08 RX ADMIN — METHADONE HYDROCHLORIDE 20 MILLIGRAM(S): 40 TABLET ORAL at 10:40

## 2019-02-08 RX ADMIN — Medication 2 MILLIGRAM(S): at 10:43

## 2019-02-08 RX ADMIN — Medication 50 MILLIGRAM(S): at 21:17

## 2019-02-08 RX ADMIN — OLANZAPINE 15 MILLIGRAM(S): 15 TABLET, FILM COATED ORAL at 21:16

## 2019-02-08 RX ADMIN — Medication 0.5 MILLIGRAM(S): at 16:04

## 2019-02-08 RX ADMIN — Medication 0.5 MILLIGRAM(S): at 21:16

## 2019-02-08 NOTE — PROGRESS NOTE BEHAVIORAL HEALTH - NSBHCHARTREVIEWVS_PSY_A_CORE FT
Vital Signs Last 24 Hrs  T(C): 36.8 (08 Feb 2019 16:09), Max: 36.8 (08 Feb 2019 09:33)  T(F): 98.3 (08 Feb 2019 16:09), Max: 98.3 (08 Feb 2019 16:09)  HR: 81 (08 Feb 2019 16:09) (76 - 81)  BP: 115/76 (08 Feb 2019 16:09) (115/76 - 119/77)  BP(mean): --  RR: 18 (08 Feb 2019 16:09) (18 - 18)  SpO2: --
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
## 2019-02-08 NOTE — PROGRESS NOTE BEHAVIORAL HEALTH - NSBHFUPINTERVALCCFT_PSY_A_CORE
not verbal  "I am okay"   Aware that request will be made for review of case by MD from [facility].
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
## 2019-02-08 NOTE — PROGRESS NOTE BEHAVIORAL HEALTH - NSBHFUPINTERVALHXFT_PSY_A_CORE
patient attends groups and walks about; noted to be a little less disorganized lately; no tremor; does not appear to be using lidocaine patch for knee;.  awaiting application for MPC and Zyprexa level.    ;;2/8: not verbal  "I am okay" ;.  attends groups; good ADLs; little change; suspciious ocasionally asking if nursing is spying on him ;.  awaiting serum Zyprexa and MPC application. ;.  attends groups; good ADLs; little change; suspciious ocasionally asking if nursing is spying on him ;.  awaiting serum Zyprexa and MPC application.

## 2019-02-09 RX ADMIN — Medication 0.5 MILLIGRAM(S): at 06:12

## 2019-02-09 RX ADMIN — OLANZAPINE 15 MILLIGRAM(S): 15 TABLET, FILM COATED ORAL at 10:30

## 2019-02-09 RX ADMIN — Medication 2 GRAM(S): at 10:31

## 2019-02-09 RX ADMIN — Medication 0.5 MILLIGRAM(S): at 21:42

## 2019-02-09 RX ADMIN — Medication 0.5 MILLIGRAM(S): at 15:19

## 2019-02-09 RX ADMIN — Medication 2 GRAM(S): at 21:42

## 2019-02-09 RX ADMIN — OLANZAPINE 15 MILLIGRAM(S): 15 TABLET, FILM COATED ORAL at 21:44

## 2019-02-09 RX ADMIN — Medication 2 MILLIGRAM(S): at 06:12

## 2019-02-09 RX ADMIN — METHADONE HYDROCHLORIDE 20 MILLIGRAM(S): 40 TABLET ORAL at 10:31

## 2019-02-09 RX ADMIN — SERTRALINE 50 MILLIGRAM(S): 25 TABLET, FILM COATED ORAL at 10:30

## 2019-02-09 RX ADMIN — Medication 50 MILLIGRAM(S): at 10:31

## 2019-02-10 RX ORDER — SODIUM CHLORIDE 0.65 %
1 AEROSOL, SPRAY (ML) NASAL EVERY 6 HOURS
Qty: 0 | Refills: 0 | Status: DISCONTINUED | OUTPATIENT
Start: 2019-02-10 | End: 2019-03-11

## 2019-02-10 RX ADMIN — Medication 2 GRAM(S): at 21:47

## 2019-02-10 RX ADMIN — Medication 1 SPRAY(S): at 15:48

## 2019-02-10 RX ADMIN — Medication 0.5 MILLIGRAM(S): at 21:47

## 2019-02-10 RX ADMIN — METHADONE HYDROCHLORIDE 20 MILLIGRAM(S): 40 TABLET ORAL at 09:39

## 2019-02-10 RX ADMIN — SERTRALINE 50 MILLIGRAM(S): 25 TABLET, FILM COATED ORAL at 09:39

## 2019-02-10 RX ADMIN — OLANZAPINE 15 MILLIGRAM(S): 15 TABLET, FILM COATED ORAL at 09:38

## 2019-02-10 RX ADMIN — Medication 0.5 MILLIGRAM(S): at 13:47

## 2019-02-10 RX ADMIN — Medication 2 GRAM(S): at 09:38

## 2019-02-10 RX ADMIN — Medication 0.5 MILLIGRAM(S): at 07:20

## 2019-02-10 RX ADMIN — OLANZAPINE 15 MILLIGRAM(S): 15 TABLET, FILM COATED ORAL at 21:47

## 2019-02-10 RX ADMIN — Medication 50 MILLIGRAM(S): at 09:38

## 2019-02-10 RX ADMIN — Medication 2 MILLIGRAM(S): at 21:47

## 2019-02-10 RX ADMIN — Medication 50 MILLIGRAM(S): at 21:47

## 2019-02-11 PROCEDURE — 99232 SBSQ HOSP IP/OBS MODERATE 35: CPT | Mod: 25

## 2019-02-11 PROCEDURE — 90853 GROUP PSYCHOTHERAPY: CPT

## 2019-02-11 RX ORDER — CLONAZEPAM 1 MG
0.5 TABLET ORAL EVERY 8 HOURS
Qty: 0 | Refills: 0 | Status: DISCONTINUED | OUTPATIENT
Start: 2019-02-11 | End: 2019-02-15

## 2019-02-11 RX ADMIN — Medication 2 MILLIGRAM(S): at 15:21

## 2019-02-11 RX ADMIN — Medication 0.5 MILLIGRAM(S): at 21:08

## 2019-02-11 RX ADMIN — Medication 2 MILLIGRAM(S): at 21:09

## 2019-02-11 RX ADMIN — Medication 1 SPRAY(S): at 10:33

## 2019-02-11 RX ADMIN — Medication 2 GRAM(S): at 21:08

## 2019-02-11 RX ADMIN — METHADONE HYDROCHLORIDE 20 MILLIGRAM(S): 40 TABLET ORAL at 10:35

## 2019-02-11 RX ADMIN — Medication 50 MILLIGRAM(S): at 21:09

## 2019-02-11 RX ADMIN — OLANZAPINE 15 MILLIGRAM(S): 15 TABLET, FILM COATED ORAL at 21:09

## 2019-02-11 RX ADMIN — Medication 1 SPRAY(S): at 21:08

## 2019-02-11 RX ADMIN — OLANZAPINE 15 MILLIGRAM(S): 15 TABLET, FILM COATED ORAL at 10:35

## 2019-02-11 RX ADMIN — Medication 0.5 MILLIGRAM(S): at 07:01

## 2019-02-11 RX ADMIN — SERTRALINE 50 MILLIGRAM(S): 25 TABLET, FILM COATED ORAL at 10:35

## 2019-02-11 RX ADMIN — Medication 50 MILLIGRAM(S): at 10:34

## 2019-02-11 RX ADMIN — Medication 2 GRAM(S): at 10:34

## 2019-02-11 RX ADMIN — Medication 0.5 MILLIGRAM(S): at 15:20

## 2019-02-11 NOTE — PROGRESS NOTE BEHAVIORAL HEALTH - NSBHFUPINTERVALCCFT_PSY_A_CORE
"What's doing doc!"   States he does not have an .  Aware that request being made for review by [facility].

## 2019-02-11 NOTE — PROGRESS NOTE BEHAVIORAL HEALTH - NSBHCHARTREVIEWVS_PSY_A_CORE FT
Vital Signs Last 24 Hrs  T(C): 36.8 (10 Feb 2019 17:00), Max: 36.8 (10 Feb 2019 17:00)  T(F): 98.2 (10 Feb 2019 17:00), Max: 98.2 (10 Feb 2019 17:00)  HR: 83 (10 Feb 2019 17:00) (76 - 83)  BP: 131/87 (10 Feb 2019 17:00) (118/82 - 131/87)  BP(mean): --  RR: 18 (10 Feb 2019 17:00) (18 - 18)  SpO2: --

## 2019-02-11 NOTE — CHART NOTE - NSCHARTNOTEFT_GEN_A_CORE
Name: Jose Sánchez  Date of Service   2/11/2019  Group Name: Self-Other Group                     	Number of Attendees: 10  Duration 45 minutes  Diagnosis Code: F25.1  DOCUMENTATION OF PARTICIPATION AND RESPONSE OF INDIVIDUAL TO GROUP TREATMENT  Behavior in Group (check all that apply):  ? Showed insight             ? XActive in discussion            ? Offered constructive input         ? No apparent interest  ?X Showed interest           ? Quietly engaged                ? Supportive to others                  ? Appeared distracted  ? Showed leadership        ? Withdrawn                       ?Not supportive to others            ? Disruptive  Individual’s Mood:    ?Stable     ?XDepressed/Sad     ?Anxious     ?Angry     ?Other  Risk Assessment  	X? None Reported or Observed   ? Danger to Self:  ? Ideation ? Plan  ? Intent  ? Attempt       ? Danger to Others:  ? Ideation ? Plan  ? Intent  ? Attempt   Describe in detail (cont. below as needed):    Goal(s)/Objective(s) Addressed:  Assessment, Decrease symptoms, Improve level of independent functioning, improve social/vocational/coping skills, Improve interpersonal relationships  Intervention(s) / Method(s) Provided: Stress management and coping skills, interpersonal effectiveness skills were addressed  Self and Others Group:  Positive and negative aspects of relationships  Response to Intervention(s) and Progress Toward Goals and Objectives: PT discussed relationship with his sister, and how he would like to have more of an uncle role to his nieces.   Plan/Additional Information (any recommendations or determinations for continued or revised treatment): Continue attending group on Mondays and Fridays   Completed By - Print Staff Name/Credentials:  Lev Atwood, PhD / Staff Psychologist	CPT Code 21770  Start Time 10:45		  Stop Time  11:30	   Duration in Minutes 45 minutes
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
## 2019-02-11 NOTE — PROGRESS NOTE BEHAVIORAL HEALTH - NSBHFUPINTERVALHXFT_PSY_A_CORE
patient attends groups and walks about; noted to be a little less disorganized lately; no tremor; does not appear to be using lidocaine patch for knee;.  awaiting application for MPC and Zyprexa level.   MSE: anxious; alert; oriented; normal gait; speech rapid; good ADLs; no tremor; cog intact;  suspicious but not hostile; mood a little euphoric.
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
## 2019-02-12 PROCEDURE — 99232 SBSQ HOSP IP/OBS MODERATE 35: CPT

## 2019-02-12 RX ORDER — NICOTINE POLACRILEX 2 MG
2 GUM BUCCAL
Qty: 0 | Refills: 0 | Status: DISCONTINUED | OUTPATIENT
Start: 2019-02-12 | End: 2019-03-14

## 2019-02-12 RX ORDER — METHADONE HYDROCHLORIDE 40 MG/1
20 TABLET ORAL DAILY
Qty: 0 | Refills: 0 | Status: DISCONTINUED | OUTPATIENT
Start: 2019-02-12 | End: 2019-02-19

## 2019-02-12 RX ORDER — DIPHENHYDRAMINE HCL 50 MG
50 CAPSULE ORAL EVERY 6 HOURS
Qty: 0 | Refills: 0 | Status: DISCONTINUED | OUTPATIENT
Start: 2019-02-12 | End: 2019-03-14

## 2019-02-12 RX ORDER — SERTRALINE 25 MG/1
50 TABLET, FILM COATED ORAL DAILY
Qty: 0 | Refills: 0 | Status: DISCONTINUED | OUTPATIENT
Start: 2019-02-12 | End: 2019-03-14

## 2019-02-12 RX ADMIN — Medication 50 MILLIGRAM(S): at 10:21

## 2019-02-12 RX ADMIN — OLANZAPINE 15 MILLIGRAM(S): 15 TABLET, FILM COATED ORAL at 12:48

## 2019-02-12 RX ADMIN — Medication 2 GRAM(S): at 10:21

## 2019-02-12 RX ADMIN — OLANZAPINE 15 MILLIGRAM(S): 15 TABLET, FILM COATED ORAL at 21:58

## 2019-02-12 RX ADMIN — METHADONE HYDROCHLORIDE 20 MILLIGRAM(S): 40 TABLET ORAL at 09:26

## 2019-02-12 RX ADMIN — Medication 2 MILLIGRAM(S): at 21:59

## 2019-02-12 RX ADMIN — Medication 50 MILLIGRAM(S): at 21:58

## 2019-02-12 RX ADMIN — Medication 2 GRAM(S): at 21:58

## 2019-02-12 RX ADMIN — SERTRALINE 50 MILLIGRAM(S): 25 TABLET, FILM COATED ORAL at 09:26

## 2019-02-12 RX ADMIN — Medication 1 SPRAY(S): at 21:59

## 2019-02-12 RX ADMIN — Medication 0.5 MILLIGRAM(S): at 21:58

## 2019-02-12 RX ADMIN — Medication 2 MILLIGRAM(S): at 12:49

## 2019-02-12 RX ADMIN — Medication 2 MILLIGRAM(S): at 09:27

## 2019-02-12 RX ADMIN — Medication 1 SPRAY(S): at 12:48

## 2019-02-12 RX ADMIN — Medication 0.5 MILLIGRAM(S): at 14:01

## 2019-02-12 RX ADMIN — Medication 1 SPRAY(S): at 06:05

## 2019-02-12 RX ADMIN — Medication 2 MILLIGRAM(S): at 06:05

## 2019-02-12 RX ADMIN — Medication 0.5 MILLIGRAM(S): at 06:05

## 2019-02-12 NOTE — PROGRESS NOTE BEHAVIORAL HEALTH - NSBHFUPINTERVALHXFT_PSY_A_CORE
no expressed SI; less prominent paranoid thinking but still has somewhat inappropriate affect; good ADLs

## 2019-02-12 NOTE — PROGRESS NOTE BEHAVIORAL HEALTH - NSBHCHARTREVIEWVS_PSY_A_CORE FT
Vital Signs Last 24 Hrs  T(C): 36.9 (11 Feb 2019 16:22), Max: 37 (11 Feb 2019 10:03)  T(F): 98.5 (11 Feb 2019 16:22), Max: 98.6 (11 Feb 2019 10:03)  HR: 76 (11 Feb 2019 16:22) (76 - 78)  BP: 122/82 (11 Feb 2019 16:22) (122/82 - 147/88)  BP(mean): --  RR: 18 (11 Feb 2019 16:22) (18 - 18)  SpO2: --
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
## 2019-02-12 NOTE — CHART NOTE - NSCHARTNOTEFT_GEN_A_CORE
Name: Jose Sánchez  Date of Service: 2/11/2019   Group Name: Self-Other Group                     	Number of Attendees: 10  Duration 45 minutes  Diagnosis Code: F25.1  DOCUMENTATION OF PARTICIPATION AND RESPONSE OF INDIVIDUAL TO GROUP TREATMENT  Behavior in Group (check all that apply):  ? Showed insight             ? XActive in discussion            ? Offered constructive input         ? No apparent interest  ?X Showed interest           ? Quietly engaged                ? Supportive to others                  ? Appeared distracted  ? Showed leadership        ? Withdrawn                       ?Not supportive to others            ? Disruptive  Individual’s Mood:    ?XStable     ?Depressed/Sad     ?Anxious     ?Angry     ?Other  Risk Assessment  	X? None Reported or Observed   ? Danger to Self:  ? Ideation ? Plan  ? Intent  ? Attempt       ? Danger to Others:  ? Ideation ? Plan  ? Intent  ? Attempt   Describe in detail (cont. below as needed):    Goal(s)/Objective(s) Addressed:  Assessment, Decrease symptoms, Improve level of independent functioning, improve social/vocational/coping skills, Improve interpersonal relationships  Intervention(s) / Method(s) Provided: Stress management and coping skills, interpersonal effectiveness skills were addressed  Self and Others Group:  “How I see myself and how others view me” activity.  Response to Intervention(s) and Progress Toward Goals and Objectives: Pt reported that he sees himself ugly but others view him smart  Plan/Additional Information (any recommendations or determinations for continued or revised treatment): Continue attending group on Mondays and Fridays   Completed By - Print Staff Name/Credentials:  Lev Atwood, PhD / Staff Psychologist	CPT Code 10194  Start Time 10:45		  Stop Time  11:30	   Duration in Minutes 45 minutes

## 2019-02-13 PROCEDURE — 99232 SBSQ HOSP IP/OBS MODERATE 35: CPT

## 2019-02-13 RX ORDER — HYDROXYZINE HCL 10 MG
50 TABLET ORAL EVERY 6 HOURS
Qty: 0 | Refills: 0 | Status: DISCONTINUED | OUTPATIENT
Start: 2019-02-13 | End: 2019-03-15

## 2019-02-13 RX ADMIN — METHADONE HYDROCHLORIDE 20 MILLIGRAM(S): 40 TABLET ORAL at 09:52

## 2019-02-13 RX ADMIN — SERTRALINE 50 MILLIGRAM(S): 25 TABLET, FILM COATED ORAL at 09:52

## 2019-02-13 RX ADMIN — Medication 50 MILLIGRAM(S): at 09:52

## 2019-02-13 RX ADMIN — Medication 2 MILLIGRAM(S): at 22:13

## 2019-02-13 RX ADMIN — LIDOCAINE 1 PATCH: 4 CREAM TOPICAL at 10:50

## 2019-02-13 RX ADMIN — Medication 0.5 MILLIGRAM(S): at 06:08

## 2019-02-13 RX ADMIN — LIDOCAINE 1 PATCH: 4 CREAM TOPICAL at 18:33

## 2019-02-13 RX ADMIN — Medication 0.5 MILLIGRAM(S): at 22:12

## 2019-02-13 RX ADMIN — OLANZAPINE 15 MILLIGRAM(S): 15 TABLET, FILM COATED ORAL at 09:52

## 2019-02-13 RX ADMIN — Medication 2 GRAM(S): at 09:52

## 2019-02-13 RX ADMIN — Medication 0.5 MILLIGRAM(S): at 14:11

## 2019-02-13 RX ADMIN — Medication 2 MILLIGRAM(S): at 09:55

## 2019-02-13 RX ADMIN — Medication 2 MILLIGRAM(S): at 06:08

## 2019-02-13 RX ADMIN — Medication 2 GRAM(S): at 22:12

## 2019-02-13 RX ADMIN — Medication 50 MILLIGRAM(S): at 22:13

## 2019-02-13 RX ADMIN — OLANZAPINE 15 MILLIGRAM(S): 15 TABLET, FILM COATED ORAL at 22:12
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
## 2019-02-13 NOTE — PROGRESS NOTE BEHAVIORAL HEALTH - NSBHCHARTREVIEWVS_PSY_A_CORE FT
Vital Signs Last 24 Hrs  T(C): 36.7 (12 Feb 2019 16:00), Max: 36.8 (12 Feb 2019 10:25)  T(F): 98 (12 Feb 2019 16:00), Max: 98.2 (12 Feb 2019 10:25)  HR: 71 (12 Feb 2019 16:00) (71 - 80)  BP: 164/106 (12 Feb 2019 16:00) (132/74 - 164/106)  BP(mean): --  RR: --  SpO2: --

## 2019-02-13 NOTE — PROGRESS NOTE BEHAVIORAL HEALTH - NSBHFUPINTERVALCCFT_PSY_A_CORE
no complaints;  still wants to go to [facility]; no complaints;  still wants to go to [facility];  tells MSW he wants Methadone reduced but not the Writer.
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
## 2019-02-14 PROCEDURE — 99232 SBSQ HOSP IP/OBS MODERATE 35: CPT

## 2019-02-14 RX ADMIN — Medication 0.5 MILLIGRAM(S): at 21:05

## 2019-02-14 RX ADMIN — METHADONE HYDROCHLORIDE 20 MILLIGRAM(S): 40 TABLET ORAL at 10:19

## 2019-02-14 RX ADMIN — Medication 2 MILLIGRAM(S): at 21:05

## 2019-02-14 RX ADMIN — Medication 2 GRAM(S): at 21:05

## 2019-02-14 RX ADMIN — Medication 2 MILLIGRAM(S): at 13:34

## 2019-02-14 RX ADMIN — Medication 0.5 MILLIGRAM(S): at 13:34

## 2019-02-14 RX ADMIN — Medication 50 MILLIGRAM(S): at 10:18

## 2019-02-14 RX ADMIN — Medication 50 MILLIGRAM(S): at 21:05

## 2019-02-14 RX ADMIN — Medication 2 GRAM(S): at 10:19

## 2019-02-14 RX ADMIN — SERTRALINE 50 MILLIGRAM(S): 25 TABLET, FILM COATED ORAL at 10:18

## 2019-02-14 RX ADMIN — OLANZAPINE 15 MILLIGRAM(S): 15 TABLET, FILM COATED ORAL at 21:05

## 2019-02-14 RX ADMIN — Medication 0.5 MILLIGRAM(S): at 07:32

## 2019-02-14 RX ADMIN — OLANZAPINE 15 MILLIGRAM(S): 15 TABLET, FILM COATED ORAL at 10:18
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
## 2019-02-14 NOTE — PROGRESS NOTE BEHAVIORAL HEALTH - NSBHFUPINTERVALCCFT_PSY_A_CORE
wants to come  off of Methadone;  wants to decrease Methadone 5mg every week;  aware of attempts to help the patient find legal representation.  No physical complaints; continues to use Lidoderm patch on knees

## 2019-02-14 NOTE — PROGRESS NOTE BEHAVIORAL HEALTH - NSBHCHARTREVIEWVS_PSY_A_CORE FT
Vital Signs Last 24 Hrs  T(C): 36.7 (13 Feb 2019 16:16), Max: 36.7 (13 Feb 2019 16:16)  T(F): 98.1 (13 Feb 2019 16:16), Max: 98.1 (13 Feb 2019 16:16)  HR: 84 (13 Feb 2019 16:16) (84 - 91)  BP: 135/79 (13 Feb 2019 16:16) (132/84 - 135/79)  BP(mean): --  RR: 18 (13 Feb 2019 16:16) (18 - 18)  SpO2: --

## 2019-02-14 NOTE — PROGRESS NOTE BEHAVIORAL HEALTH - NSBHFUPINTERVALHXFT_PSY_A_CORE
alert oriented; cog intact; ;  Continues to make some progress; less constricted; less prominence of paranoid thinking; able to joke about issues that would have upset him weeks ago.

## 2019-02-15 PROCEDURE — 99232 SBSQ HOSP IP/OBS MODERATE 35: CPT

## 2019-02-15 RX ORDER — CLONAZEPAM 1 MG
0.5 TABLET ORAL EVERY 8 HOURS
Qty: 0 | Refills: 0 | Status: DISCONTINUED | OUTPATIENT
Start: 2019-02-15 | End: 2019-02-20

## 2019-02-15 RX ADMIN — OLANZAPINE 15 MILLIGRAM(S): 15 TABLET, FILM COATED ORAL at 10:04

## 2019-02-15 RX ADMIN — Medication 1 SPRAY(S): at 21:39

## 2019-02-15 RX ADMIN — Medication 1 SPRAY(S): at 12:42

## 2019-02-15 RX ADMIN — Medication 0.5 MILLIGRAM(S): at 14:13

## 2019-02-15 RX ADMIN — OLANZAPINE 15 MILLIGRAM(S): 15 TABLET, FILM COATED ORAL at 21:38

## 2019-02-15 RX ADMIN — Medication 0.5 MILLIGRAM(S): at 21:39

## 2019-02-15 RX ADMIN — Medication 2 MILLIGRAM(S): at 21:39

## 2019-02-15 RX ADMIN — Medication 50 MILLIGRAM(S): at 10:04

## 2019-02-15 RX ADMIN — METHADONE HYDROCHLORIDE 20 MILLIGRAM(S): 40 TABLET ORAL at 10:03

## 2019-02-15 RX ADMIN — Medication 2 MILLIGRAM(S): at 14:29

## 2019-02-15 RX ADMIN — Medication 2 MILLIGRAM(S): at 10:05

## 2019-02-15 RX ADMIN — SERTRALINE 50 MILLIGRAM(S): 25 TABLET, FILM COATED ORAL at 10:06

## 2019-02-15 RX ADMIN — Medication 0.5 MILLIGRAM(S): at 06:10

## 2019-02-15 RX ADMIN — Medication 2 GRAM(S): at 10:04

## 2019-02-15 RX ADMIN — Medication 2 GRAM(S): at 21:39

## 2019-02-15 RX ADMIN — HALOPERIDOL DECANOATE 5 MILLIGRAM(S): 100 INJECTION INTRAMUSCULAR at 01:55

## 2019-02-15 RX ADMIN — Medication 50 MILLIGRAM(S): at 21:39

## 2019-02-15 NOTE — PROGRESS NOTE BEHAVIORAL HEALTH - NSBHFUPINTERVALHXFT_PSY_A_CORE
walks about the unit anxious but mood improved; less paranoid walks about the unit anxious but mood improved; less paranoid; more social with peers;

## 2019-02-15 NOTE — PROGRESS NOTE BEHAVIORAL HEALTH - NSBHFUPSUICACTIVE_PSY_A_CORE
none known
yes
none known
unable to assess
none known
yes
none known
none known
yes
none known
yes
yes
none known
yes
none known
yes

## 2019-02-15 NOTE — PROGRESS NOTE BEHAVIORAL HEALTH - NSBHFUPSUICINTENT_PSY_A_CORE
none known
yes
none known
unable to assess
none known
unable to assess
none known
unable to assess
none known
unable to assess
none known
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
## 2019-02-15 NOTE — PROGRESS NOTE BEHAVIORAL HEALTH - NSBHCHARTREVIEWVS_PSY_A_CORE FT
Vital Signs Last 24 Hrs  T(C): 36.7 (15 Feb 2019 09:42), Max: 36.8 (14 Feb 2019 17:18)  T(F): 98.1 (15 Feb 2019 09:42), Max: 98.3 (14 Feb 2019 17:18)  HR: 90 (15 Feb 2019 09:42) (82 - 90)  BP: 117/80 (15 Feb 2019 09:42) (113/77 - 117/80)  BP(mean): --  RR: 18 (14 Feb 2019 17:18) (18 - 18)  SpO2: --

## 2019-02-15 NOTE — PROGRESS NOTE BEHAVIORAL HEALTH - NSBHFUPSUICPLAN_PSY_A_CORE
none known
yes
none known
unable to assess
none known
yes
none known
yes
none known
yes
none known
yes

## 2019-02-15 NOTE — PROGRESS NOTE BEHAVIORAL HEALTH - NSBHFUPSUICPASSIVE_PSY_A_CORE
yes

## 2019-02-15 NOTE — PROGRESS NOTE BEHAVIORAL HEALTH - NSBHFUPINTERVALCCFT_PSY_A_CORE
patient continues to want Methadone taper;  discussed issues of wanting/needing legal representation

## 2019-02-16 RX ADMIN — Medication 2 GRAM(S): at 21:11

## 2019-02-16 RX ADMIN — Medication 0.5 MILLIGRAM(S): at 21:11

## 2019-02-16 RX ADMIN — SERTRALINE 50 MILLIGRAM(S): 25 TABLET, FILM COATED ORAL at 09:03

## 2019-02-16 RX ADMIN — Medication 2 MILLIGRAM(S): at 21:14

## 2019-02-16 RX ADMIN — Medication 0.5 MILLIGRAM(S): at 14:10

## 2019-02-16 RX ADMIN — Medication 50 MILLIGRAM(S): at 09:06

## 2019-02-16 RX ADMIN — OLANZAPINE 15 MILLIGRAM(S): 15 TABLET, FILM COATED ORAL at 09:03

## 2019-02-16 RX ADMIN — METHADONE HYDROCHLORIDE 20 MILLIGRAM(S): 40 TABLET ORAL at 09:03

## 2019-02-16 RX ADMIN — Medication 1 SPRAY(S): at 09:34

## 2019-02-16 RX ADMIN — Medication 2 GRAM(S): at 09:03

## 2019-02-16 RX ADMIN — OLANZAPINE 15 MILLIGRAM(S): 15 TABLET, FILM COATED ORAL at 21:11

## 2019-02-16 RX ADMIN — Medication 0.5 MILLIGRAM(S): at 06:50

## 2019-02-16 RX ADMIN — Medication 2 MILLIGRAM(S): at 09:03

## 2019-02-17 LAB
ALBUMIN SERPL ELPH-MCNC: 4.3 G/DL — SIGNIFICANT CHANGE UP (ref 3.3–5)
ALP SERPL-CCNC: 75 U/L — SIGNIFICANT CHANGE UP (ref 40–120)
ALT FLD-CCNC: 20 U/L — SIGNIFICANT CHANGE UP (ref 10–45)
ANION GAP SERPL CALC-SCNC: 10 MMOL/L — SIGNIFICANT CHANGE UP (ref 5–17)
AST SERPL-CCNC: 22 U/L — SIGNIFICANT CHANGE UP (ref 10–40)
BASOPHILS # BLD AUTO: 0.07 K/UL — SIGNIFICANT CHANGE UP (ref 0–0.2)
BASOPHILS NFR BLD AUTO: 0.9 % — SIGNIFICANT CHANGE UP (ref 0–2)
BILIRUB SERPL-MCNC: 0.4 MG/DL — SIGNIFICANT CHANGE UP (ref 0.2–1.2)
BUN SERPL-MCNC: 25 MG/DL — HIGH (ref 7–23)
CALCIUM SERPL-MCNC: 9.3 MG/DL — SIGNIFICANT CHANGE UP (ref 8.4–10.5)
CHLORIDE SERPL-SCNC: 103 MMOL/L — SIGNIFICANT CHANGE UP (ref 96–108)
CO2 SERPL-SCNC: 27 MMOL/L — SIGNIFICANT CHANGE UP (ref 22–31)
CREAT SERPL-MCNC: 0.85 MG/DL — SIGNIFICANT CHANGE UP (ref 0.5–1.3)
EOSINOPHIL # BLD AUTO: 0.82 K/UL — HIGH (ref 0–0.5)
EOSINOPHIL NFR BLD AUTO: 10.8 % — HIGH (ref 0–6)
GLUCOSE SERPL-MCNC: 130 MG/DL — HIGH (ref 70–99)
HBA1C BLD-MCNC: 5.4 % — SIGNIFICANT CHANGE UP (ref 4–5.6)
HCT VFR BLD CALC: 39.2 % — SIGNIFICANT CHANGE UP (ref 39–50)
HGB BLD-MCNC: 12.9 G/DL — LOW (ref 13–17)
IMM GRANULOCYTES NFR BLD AUTO: 0.1 % — SIGNIFICANT CHANGE UP (ref 0–1.5)
LYMPHOCYTES # BLD AUTO: 2.38 K/UL — SIGNIFICANT CHANGE UP (ref 1–3.3)
LYMPHOCYTES # BLD AUTO: 31.5 % — SIGNIFICANT CHANGE UP (ref 13–44)
MCHC RBC-ENTMCNC: 27.7 PG — SIGNIFICANT CHANGE UP (ref 27–34)
MCHC RBC-ENTMCNC: 32.9 GM/DL — SIGNIFICANT CHANGE UP (ref 32–36)
MCV RBC AUTO: 84.1 FL — SIGNIFICANT CHANGE UP (ref 80–100)
MONOCYTES # BLD AUTO: 0.61 K/UL — SIGNIFICANT CHANGE UP (ref 0–0.9)
MONOCYTES NFR BLD AUTO: 8.1 % — SIGNIFICANT CHANGE UP (ref 2–14)
NEUTROPHILS # BLD AUTO: 3.67 K/UL — SIGNIFICANT CHANGE UP (ref 1.8–7.4)
NEUTROPHILS NFR BLD AUTO: 48.6 % — SIGNIFICANT CHANGE UP (ref 43–77)
NRBC # BLD: 0 /100 WBCS — SIGNIFICANT CHANGE UP (ref 0–0)
PLATELET # BLD AUTO: 300 K/UL — SIGNIFICANT CHANGE UP (ref 150–400)
POTASSIUM SERPL-MCNC: 3.8 MMOL/L — SIGNIFICANT CHANGE UP (ref 3.5–5.3)
POTASSIUM SERPL-SCNC: 3.8 MMOL/L — SIGNIFICANT CHANGE UP (ref 3.5–5.3)
PROT SERPL-MCNC: 7.8 G/DL — SIGNIFICANT CHANGE UP (ref 6–8.3)
RBC # BLD: 4.66 M/UL — SIGNIFICANT CHANGE UP (ref 4.2–5.8)
RBC # FLD: 14.5 % — SIGNIFICANT CHANGE UP (ref 10.3–14.5)
SODIUM SERPL-SCNC: 140 MMOL/L — SIGNIFICANT CHANGE UP (ref 135–145)
WBC # BLD: 7.56 K/UL — SIGNIFICANT CHANGE UP (ref 3.8–10.5)
WBC # FLD AUTO: 7.56 K/UL — SIGNIFICANT CHANGE UP (ref 3.8–10.5)

## 2019-02-17 RX ADMIN — METHADONE HYDROCHLORIDE 20 MILLIGRAM(S): 40 TABLET ORAL at 09:44

## 2019-02-17 RX ADMIN — Medication 2 GRAM(S): at 09:45

## 2019-02-17 RX ADMIN — Medication 0.5 MILLIGRAM(S): at 06:26

## 2019-02-17 RX ADMIN — Medication 0.5 MILLIGRAM(S): at 21:37

## 2019-02-17 RX ADMIN — Medication 2 MILLIGRAM(S): at 13:54

## 2019-02-17 RX ADMIN — Medication 50 MILLIGRAM(S): at 09:45

## 2019-02-17 RX ADMIN — OLANZAPINE 15 MILLIGRAM(S): 15 TABLET, FILM COATED ORAL at 21:37

## 2019-02-17 RX ADMIN — Medication 0.5 MILLIGRAM(S): at 13:54

## 2019-02-17 RX ADMIN — Medication 2 GRAM(S): at 21:37

## 2019-02-17 RX ADMIN — Medication 50 MILLIGRAM(S): at 21:38

## 2019-02-17 RX ADMIN — SERTRALINE 50 MILLIGRAM(S): 25 TABLET, FILM COATED ORAL at 09:45

## 2019-02-17 RX ADMIN — Medication 2 MILLIGRAM(S): at 21:39

## 2019-02-17 RX ADMIN — OLANZAPINE 15 MILLIGRAM(S): 15 TABLET, FILM COATED ORAL at 09:44

## 2019-02-17 RX ADMIN — LIDOCAINE 1 PATCH: 4 CREAM TOPICAL at 13:54

## 2019-02-18 RX ADMIN — Medication 50 MILLIGRAM(S): at 00:10

## 2019-02-18 RX ADMIN — Medication 50 MILLIGRAM(S): at 10:02

## 2019-02-18 RX ADMIN — Medication 0.5 MILLIGRAM(S): at 06:20

## 2019-02-18 RX ADMIN — Medication 50 MILLIGRAM(S): at 22:07

## 2019-02-18 RX ADMIN — HALOPERIDOL DECANOATE 5 MILLIGRAM(S): 100 INJECTION INTRAMUSCULAR at 00:10

## 2019-02-18 RX ADMIN — Medication 2 GRAM(S): at 10:02

## 2019-02-18 RX ADMIN — Medication 0.5 MILLIGRAM(S): at 22:07

## 2019-02-18 RX ADMIN — OLANZAPINE 15 MILLIGRAM(S): 15 TABLET, FILM COATED ORAL at 10:02

## 2019-02-18 RX ADMIN — OLANZAPINE 15 MILLIGRAM(S): 15 TABLET, FILM COATED ORAL at 22:08

## 2019-02-18 RX ADMIN — METHADONE HYDROCHLORIDE 20 MILLIGRAM(S): 40 TABLET ORAL at 10:01

## 2019-02-18 RX ADMIN — SERTRALINE 50 MILLIGRAM(S): 25 TABLET, FILM COATED ORAL at 10:03

## 2019-02-18 RX ADMIN — Medication 2 GRAM(S): at 22:07

## 2019-02-18 RX ADMIN — Medication 1 SPRAY(S): at 11:20

## 2019-02-18 RX ADMIN — Medication 1 SPRAY(S): at 22:18

## 2019-02-19 PROCEDURE — 99232 SBSQ HOSP IP/OBS MODERATE 35: CPT

## 2019-02-19 RX ORDER — HALOPERIDOL DECANOATE 100 MG/ML
5 INJECTION INTRAMUSCULAR EVERY 6 HOURS
Qty: 0 | Refills: 0 | Status: DISCONTINUED | OUTPATIENT
Start: 2019-02-19 | End: 2019-03-21

## 2019-02-19 RX ORDER — METHADONE HYDROCHLORIDE 40 MG/1
20 TABLET ORAL DAILY
Qty: 0 | Refills: 0 | Status: DISCONTINUED | OUTPATIENT
Start: 2019-02-19 | End: 2019-02-20

## 2019-02-19 RX ADMIN — OLANZAPINE 15 MILLIGRAM(S): 15 TABLET, FILM COATED ORAL at 09:53

## 2019-02-19 RX ADMIN — Medication 0.5 MILLIGRAM(S): at 06:00

## 2019-02-19 RX ADMIN — Medication 1 SPRAY(S): at 21:40

## 2019-02-19 RX ADMIN — Medication 2 MILLIGRAM(S): at 21:41

## 2019-02-19 RX ADMIN — Medication 2 GRAM(S): at 21:41

## 2019-02-19 RX ADMIN — Medication 50 MILLIGRAM(S): at 21:41

## 2019-02-19 RX ADMIN — Medication 0.5 MILLIGRAM(S): at 14:07

## 2019-02-19 RX ADMIN — OLANZAPINE 15 MILLIGRAM(S): 15 TABLET, FILM COATED ORAL at 21:43

## 2019-02-19 RX ADMIN — SERTRALINE 50 MILLIGRAM(S): 25 TABLET, FILM COATED ORAL at 09:51

## 2019-02-19 RX ADMIN — Medication 2 GRAM(S): at 09:48

## 2019-02-19 RX ADMIN — Medication 2 MILLIGRAM(S): at 09:52

## 2019-02-19 RX ADMIN — METHADONE HYDROCHLORIDE 20 MILLIGRAM(S): 40 TABLET ORAL at 09:51

## 2019-02-19 RX ADMIN — Medication 0.5 MILLIGRAM(S): at 21:41

## 2019-02-19 NOTE — PROGRESS NOTE BEHAVIORAL HEALTH - NSBHFUPINTERVALHXFT_PSY_A_CORE
MSE: better eye contact; less anxious; more coherent; good ADLs; focused on Suboxone. Some concern raised by staff about sedation yesterday; may be a consideration regarding lowering either Klonopin or Methadone.  MSE: better eye contact; less anxious; more coherent; good ADLs; focused on Suboxone.
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
## 2019-02-19 NOTE — PROGRESS NOTE BEHAVIORAL HEALTH - NSBHCHARTREVIEWVS_PSY_A_CORE FT
Vital Signs Last 24 Hrs  T(C): 36.9 (18 Feb 2019 17:58), Max: 36.9 (18 Feb 2019 17:58)  T(F): 98.4 (18 Feb 2019 17:58), Max: 98.4 (18 Feb 2019 17:58)  HR: 84 (18 Feb 2019 17:58) (84 - 84)  BP: 118/79 (18 Feb 2019 17:58) (110/77 - 118/79)  BP(mean): --  RR: 18 (18 Feb 2019 17:58) (18 - 18)  SpO2: --
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
## 2019-02-20 PROCEDURE — 99232 SBSQ HOSP IP/OBS MODERATE 35: CPT

## 2019-02-20 RX ORDER — CLONAZEPAM 1 MG
0.5 TABLET ORAL EVERY 8 HOURS
Qty: 0 | Refills: 0 | Status: DISCONTINUED | OUTPATIENT
Start: 2019-02-20 | End: 2019-02-25

## 2019-02-20 RX ORDER — METHADONE HYDROCHLORIDE 40 MG/1
15 TABLET ORAL DAILY
Qty: 0 | Refills: 0 | Status: DISCONTINUED | OUTPATIENT
Start: 2019-02-21 | End: 2019-02-25

## 2019-02-20 RX ADMIN — Medication 0.5 MILLIGRAM(S): at 21:10

## 2019-02-20 RX ADMIN — METHADONE HYDROCHLORIDE 20 MILLIGRAM(S): 40 TABLET ORAL at 10:07

## 2019-02-20 RX ADMIN — Medication 0.5 MILLIGRAM(S): at 13:39

## 2019-02-20 RX ADMIN — Medication 2 GRAM(S): at 10:07

## 2019-02-20 RX ADMIN — SERTRALINE 50 MILLIGRAM(S): 25 TABLET, FILM COATED ORAL at 10:00

## 2019-02-20 RX ADMIN — Medication 0.5 MILLIGRAM(S): at 06:55

## 2019-02-20 RX ADMIN — OLANZAPINE 15 MILLIGRAM(S): 15 TABLET, FILM COATED ORAL at 11:05

## 2019-02-20 RX ADMIN — OLANZAPINE 15 MILLIGRAM(S): 15 TABLET, FILM COATED ORAL at 21:10

## 2019-02-20 RX ADMIN — Medication 2 GRAM(S): at 21:10

## 2019-02-20 RX ADMIN — Medication 50 MILLIGRAM(S): at 10:07

## 2019-02-20 NOTE — PROGRESS NOTE BEHAVIORAL HEALTH - NSBHFUPINTERVALCCFT_PSY_A_CORE
accepting tapering down on Methadone given sedation observed;  patient aware of attempt to secure an interview from [facility] MD.  Wants Suboxone.

## 2019-02-20 NOTE — PROGRESS NOTE BEHAVIORAL HEALTH - NSBHCHARTREVIEWVS_PSY_A_CORE FT
Vital Signs Last 24 Hrs  T(C): 36.8 (19 Feb 2019 16:38), Max: 37.3 (19 Feb 2019 10:00)  T(F): 98.2 (19 Feb 2019 16:38), Max: 99.1 (19 Feb 2019 10:00)  HR: 80 (19 Feb 2019 16:38) (80 - 84)  BP: 127/80 (19 Feb 2019 16:38) (127/80 - 129/82)  BP(mean): --  RR: 18 (19 Feb 2019 16:38) (18 - 20)  SpO2: --
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
## 2019-02-20 NOTE — PROGRESS NOTE BEHAVIORAL HEALTH - NSBHFUPINTERVALHXFT_PSY_A_CORE
refusing Topamax; continues paranoid about the streets but mood is better; somewhat better ADLs; better eye contact; speech clear; no tremor; normal gait; no s/h i/i/p or avh but suspicious.

## 2019-02-21 PROCEDURE — 99232 SBSQ HOSP IP/OBS MODERATE 35: CPT

## 2019-02-21 RX ADMIN — METHADONE HYDROCHLORIDE 15 MILLIGRAM(S): 40 TABLET ORAL at 10:32

## 2019-02-21 RX ADMIN — Medication 0.5 MILLIGRAM(S): at 06:20

## 2019-02-21 RX ADMIN — Medication 2 MILLIGRAM(S): at 18:42

## 2019-02-21 RX ADMIN — Medication 50 MILLIGRAM(S): at 10:32

## 2019-02-21 RX ADMIN — Medication 2 GRAM(S): at 21:17

## 2019-02-21 RX ADMIN — Medication 2 GRAM(S): at 10:33

## 2019-02-21 RX ADMIN — Medication 50 MILLIGRAM(S): at 21:17

## 2019-02-21 RX ADMIN — Medication 2 MILLIGRAM(S): at 13:47

## 2019-02-21 RX ADMIN — Medication 1 SPRAY(S): at 10:34

## 2019-02-21 RX ADMIN — Medication 0.5 MILLIGRAM(S): at 21:17

## 2019-02-21 RX ADMIN — Medication 650 MILLIGRAM(S): at 17:48

## 2019-02-21 RX ADMIN — Medication 650 MILLIGRAM(S): at 16:48

## 2019-02-21 RX ADMIN — Medication 0.5 MILLIGRAM(S): at 13:45

## 2019-02-21 RX ADMIN — SERTRALINE 50 MILLIGRAM(S): 25 TABLET, FILM COATED ORAL at 10:32

## 2019-02-21 RX ADMIN — OLANZAPINE 15 MILLIGRAM(S): 15 TABLET, FILM COATED ORAL at 10:32

## 2019-02-21 RX ADMIN — OLANZAPINE 15 MILLIGRAM(S): 15 TABLET, FILM COATED ORAL at 21:17
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
## 2019-02-21 NOTE — PROGRESS NOTE BEHAVIORAL HEALTH - NSBHFUPINTERVALHXFT_PSY_A_CORE
mood is better; somewhat better ADLs; better eye contact; speech clear; no tremor; normal gait; no s/h i/i/p or avh but suspicious.

## 2019-02-21 NOTE — PROGRESS NOTE BEHAVIORAL HEALTH - NSBHCHARTREVIEWVS_PSY_A_CORE FT
Vital Signs Last 24 Hrs  T(C): 36.5 (21 Feb 2019 10:00), Max: 36.8 (20 Feb 2019 16:12)  T(F): 97.7 (21 Feb 2019 10:00), Max: 98.2 (20 Feb 2019 16:12)  HR: 81 (21 Feb 2019 10:00) (76 - 81)  BP: 125/85 (21 Feb 2019 10:00) (119/75 - 125/85)  BP(mean): --  RR: 20 (21 Feb 2019 10:00) (18 - 20)  SpO2: --

## 2019-02-21 NOTE — PROGRESS NOTE BEHAVIORAL HEALTH - NSBHFUPINTERVALCCFT_PSY_A_CORE
anticipates meeting with [place] from [facility]; continues to endorse feeling paranoid on the streets
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
## 2019-02-22 PROCEDURE — 99232 SBSQ HOSP IP/OBS MODERATE 35: CPT

## 2019-02-22 RX ADMIN — Medication 0.5 MILLIGRAM(S): at 22:12

## 2019-02-22 RX ADMIN — Medication 2 GRAM(S): at 22:12

## 2019-02-22 RX ADMIN — Medication 2 MILLIGRAM(S): at 09:41

## 2019-02-22 RX ADMIN — OLANZAPINE 15 MILLIGRAM(S): 15 TABLET, FILM COATED ORAL at 09:38

## 2019-02-22 RX ADMIN — Medication 2 GRAM(S): at 09:37

## 2019-02-22 RX ADMIN — Medication 0.5 MILLIGRAM(S): at 06:22

## 2019-02-22 RX ADMIN — METHADONE HYDROCHLORIDE 15 MILLIGRAM(S): 40 TABLET ORAL at 09:37

## 2019-02-22 RX ADMIN — Medication 0.5 MILLIGRAM(S): at 14:08

## 2019-02-22 RX ADMIN — OLANZAPINE 15 MILLIGRAM(S): 15 TABLET, FILM COATED ORAL at 22:12

## 2019-02-22 RX ADMIN — Medication 2 MILLIGRAM(S): at 06:22

## 2019-02-22 RX ADMIN — SERTRALINE 50 MILLIGRAM(S): 25 TABLET, FILM COATED ORAL at 09:37

## 2019-02-22 NOTE — PROGRESS NOTE BEHAVIORAL HEALTH - NSBHCHARTREVIEWVS_PSY_A_CORE FT
Vital Signs Last 24 Hrs  T(C): 36.9 (21 Feb 2019 16:54), Max: 36.9 (21 Feb 2019 16:54)  T(F): 98.5 (21 Feb 2019 16:54), Max: 98.5 (21 Feb 2019 16:54)  HR: 86 (21 Feb 2019 16:54) (81 - 86)  BP: 126/84 (21 Feb 2019 16:54) (125/85 - 126/84)  BP(mean): --  RR: 18 (21 Feb 2019 16:54) (18 - 20)  SpO2: --

## 2019-02-22 NOTE — PROGRESS NOTE BEHAVIORAL HEALTH - NSBHFUPINTERVALCCFT_PSY_A_CORE
"They said there is a waiting list."  Seems convinced based on [place] with [place] [facility] [place] is [place] [facility] no complaints
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
## 2019-02-23 RX ADMIN — OLANZAPINE 15 MILLIGRAM(S): 15 TABLET, FILM COATED ORAL at 21:07

## 2019-02-23 RX ADMIN — Medication 0.5 MILLIGRAM(S): at 06:50

## 2019-02-23 RX ADMIN — Medication 2 GRAM(S): at 21:07

## 2019-02-23 RX ADMIN — Medication 50 MILLIGRAM(S): at 15:46

## 2019-02-23 RX ADMIN — METHADONE HYDROCHLORIDE 15 MILLIGRAM(S): 40 TABLET ORAL at 09:57

## 2019-02-23 RX ADMIN — Medication 2 GRAM(S): at 09:57

## 2019-02-23 RX ADMIN — OLANZAPINE 15 MILLIGRAM(S): 15 TABLET, FILM COATED ORAL at 09:58

## 2019-02-23 RX ADMIN — Medication 1 SPRAY(S): at 11:29

## 2019-02-23 RX ADMIN — SERTRALINE 50 MILLIGRAM(S): 25 TABLET, FILM COATED ORAL at 09:57

## 2019-02-23 RX ADMIN — Medication 0.5 MILLIGRAM(S): at 13:39

## 2019-02-23 RX ADMIN — Medication 2 MILLIGRAM(S): at 21:07

## 2019-02-23 RX ADMIN — Medication 0.5 MILLIGRAM(S): at 21:07

## 2019-02-23 RX ADMIN — HALOPERIDOL DECANOATE 5 MILLIGRAM(S): 100 INJECTION INTRAMUSCULAR at 22:03

## 2019-02-23 RX ADMIN — Medication 2 MILLIGRAM(S): at 14:09

## 2019-02-23 RX ADMIN — Medication 50 MILLIGRAM(S): at 21:07

## 2019-02-24 RX ADMIN — METHADONE HYDROCHLORIDE 15 MILLIGRAM(S): 40 TABLET ORAL at 09:55

## 2019-02-24 RX ADMIN — OLANZAPINE 15 MILLIGRAM(S): 15 TABLET, FILM COATED ORAL at 09:55

## 2019-02-24 RX ADMIN — Medication 2 GRAM(S): at 09:55

## 2019-02-24 RX ADMIN — OLANZAPINE 15 MILLIGRAM(S): 15 TABLET, FILM COATED ORAL at 21:12

## 2019-02-24 RX ADMIN — Medication 0.5 MILLIGRAM(S): at 06:58

## 2019-02-24 RX ADMIN — Medication 2 GRAM(S): at 21:12

## 2019-02-24 RX ADMIN — Medication 0.5 MILLIGRAM(S): at 21:12

## 2019-02-24 RX ADMIN — SERTRALINE 50 MILLIGRAM(S): 25 TABLET, FILM COATED ORAL at 09:54

## 2019-02-24 RX ADMIN — HALOPERIDOL DECANOATE 5 MILLIGRAM(S): 100 INJECTION INTRAMUSCULAR at 21:12

## 2019-02-24 RX ADMIN — Medication 0.5 MILLIGRAM(S): at 15:03

## 2019-02-25 PROCEDURE — 99232 SBSQ HOSP IP/OBS MODERATE 35: CPT

## 2019-02-25 RX ORDER — CLONAZEPAM 1 MG
0.5 TABLET ORAL EVERY 8 HOURS
Qty: 0 | Refills: 0 | Status: DISCONTINUED | OUTPATIENT
Start: 2019-02-25 | End: 2019-03-04

## 2019-02-25 RX ORDER — OLANZAPINE 15 MG/1
15 TABLET, FILM COATED ORAL
Qty: 0 | Refills: 0 | Status: DISCONTINUED | OUTPATIENT
Start: 2019-02-25 | End: 2019-03-21

## 2019-02-25 RX ORDER — METHADONE HYDROCHLORIDE 40 MG/1
15 TABLET ORAL DAILY
Qty: 0 | Refills: 0 | Status: DISCONTINUED | OUTPATIENT
Start: 2019-02-25 | End: 2019-02-26

## 2019-02-25 RX ADMIN — Medication 2 MILLIGRAM(S): at 10:57

## 2019-02-25 RX ADMIN — Medication 50 MILLIGRAM(S): at 21:32

## 2019-02-25 RX ADMIN — HALOPERIDOL DECANOATE 5 MILLIGRAM(S): 100 INJECTION INTRAMUSCULAR at 21:32

## 2019-02-25 RX ADMIN — SERTRALINE 50 MILLIGRAM(S): 25 TABLET, FILM COATED ORAL at 10:57

## 2019-02-25 RX ADMIN — Medication 2 GRAM(S): at 21:32

## 2019-02-25 RX ADMIN — Medication 2 GRAM(S): at 10:57

## 2019-02-25 RX ADMIN — METHADONE HYDROCHLORIDE 15 MILLIGRAM(S): 40 TABLET ORAL at 10:57

## 2019-02-25 RX ADMIN — OLANZAPINE 15 MILLIGRAM(S): 15 TABLET, FILM COATED ORAL at 21:32

## 2019-02-25 RX ADMIN — Medication 2 MILLIGRAM(S): at 16:53

## 2019-02-25 RX ADMIN — Medication 0.5 MILLIGRAM(S): at 07:10

## 2019-02-25 RX ADMIN — Medication 0.5 MILLIGRAM(S): at 21:31

## 2019-02-25 RX ADMIN — Medication 0.5 MILLIGRAM(S): at 16:52

## 2019-02-25 RX ADMIN — OLANZAPINE 15 MILLIGRAM(S): 15 TABLET, FILM COATED ORAL at 10:57

## 2019-02-25 NOTE — PROGRESS NOTE BEHAVIORAL HEALTH - NSBHFUPINTERVALHXFT_PSY_A_CORE
no evidence of withdrawal sxs at this time; sitting by the TV; anxious and preoccupied; no s/h i/i/p ; slightt blocking; anxious; guarded
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
## 2019-02-25 NOTE — PROGRESS NOTE BEHAVIORAL HEALTH - NSBHFUPINTERVALCCFT_PSY_A_CORE
states that he finds it difficult to taper off of  Methadone but wants to continue at 15mg a day;  no other complaints

## 2019-02-25 NOTE — PROGRESS NOTE BEHAVIORAL HEALTH - NSBHCHARTREVIEWVS_PSY_A_CORE FT
Vital Signs Last 24 Hrs  T(C): 36.4 (25 Feb 2019 10:29), Max: 36.4 (24 Feb 2019 16:18)  T(F): 97.5 (25 Feb 2019 10:29), Max: 97.6 (24 Feb 2019 16:18)  HR: 75 (25 Feb 2019 10:29) (75 - 76)  BP: 130/87 (25 Feb 2019 10:29) (126/79 - 130/87)  BP(mean): --  RR: --  SpO2: --
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
## 2019-02-26 PROCEDURE — 99232 SBSQ HOSP IP/OBS MODERATE 35: CPT

## 2019-02-26 RX ORDER — METHADONE HYDROCHLORIDE 40 MG/1
20 TABLET ORAL DAILY
Qty: 0 | Refills: 0 | Status: DISCONTINUED | OUTPATIENT
Start: 2019-02-27 | End: 2019-03-04

## 2019-02-26 RX ADMIN — METHADONE HYDROCHLORIDE 15 MILLIGRAM(S): 40 TABLET ORAL at 09:25

## 2019-02-26 RX ADMIN — OLANZAPINE 15 MILLIGRAM(S): 15 TABLET, FILM COATED ORAL at 09:25

## 2019-02-26 RX ADMIN — OLANZAPINE 15 MILLIGRAM(S): 15 TABLET, FILM COATED ORAL at 21:42

## 2019-02-26 RX ADMIN — Medication 0.5 MILLIGRAM(S): at 14:51

## 2019-02-26 RX ADMIN — Medication 0.5 MILLIGRAM(S): at 06:40

## 2019-02-26 RX ADMIN — Medication 2 MILLIGRAM(S): at 21:42

## 2019-02-26 RX ADMIN — SERTRALINE 50 MILLIGRAM(S): 25 TABLET, FILM COATED ORAL at 09:25

## 2019-02-26 RX ADMIN — Medication 50 MILLIGRAM(S): at 12:13

## 2019-02-26 RX ADMIN — Medication 0.5 MILLIGRAM(S): at 21:42

## 2019-02-26 RX ADMIN — Medication 1 SPRAY(S): at 09:26

## 2019-02-26 RX ADMIN — Medication 2 GRAM(S): at 12:12

## 2019-02-26 RX ADMIN — Medication 50 MILLIGRAM(S): at 21:42

## 2019-02-26 RX ADMIN — Medication 2 GRAM(S): at 21:42

## 2019-02-26 RX ADMIN — HALOPERIDOL DECANOATE 5 MILLIGRAM(S): 100 INJECTION INTRAMUSCULAR at 17:22

## 2019-02-26 RX ADMIN — Medication 2 MILLIGRAM(S): at 09:26

## 2019-02-26 NOTE — PROGRESS NOTE BEHAVIORAL HEALTH - NSBHCHARTREVIEWVS_PSY_A_CORE FT
Vital Signs Last 24 Hrs  T(C): 36.3 (26 Feb 2019 09:55), Max: 36.8 (25 Feb 2019 16:05)  T(F): 97.4 (26 Feb 2019 09:55), Max: 98.2 (25 Feb 2019 16:05)  HR: 80 (26 Feb 2019 09:55) (76 - 80)  BP: 136/85 (26 Feb 2019 09:55) (131/85 - 136/85)  BP(mean): --  RR: 20 (26 Feb 2019 09:55) (18 - 20)  SpO2: --
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
## 2019-02-26 NOTE — PROGRESS NOTE BEHAVIORAL HEALTH - NSBHFUPINTERVALCCFT_PSY_A_CORE
wants to return to higher dose of Methadone (i.e. 30mg) and wishes to go higher and not taper; no sleep appetite or pain complaints wants to return to higher dose of Methadone (i.e. 20mg) and wishes to go higher and not taper; no sleep appetite or pain complaints
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
## 2019-02-27 PROCEDURE — 99232 SBSQ HOSP IP/OBS MODERATE 35: CPT

## 2019-02-27 RX ADMIN — METHADONE HYDROCHLORIDE 20 MILLIGRAM(S): 40 TABLET ORAL at 10:11

## 2019-02-27 RX ADMIN — OLANZAPINE 15 MILLIGRAM(S): 15 TABLET, FILM COATED ORAL at 10:11

## 2019-02-27 RX ADMIN — Medication 0.5 MILLIGRAM(S): at 13:56

## 2019-02-27 RX ADMIN — Medication 2 MILLIGRAM(S): at 21:18

## 2019-02-27 RX ADMIN — Medication 50 MILLIGRAM(S): at 21:15

## 2019-02-27 RX ADMIN — Medication 2 MILLIGRAM(S): at 10:14

## 2019-02-27 RX ADMIN — Medication 0.5 MILLIGRAM(S): at 06:33

## 2019-02-27 RX ADMIN — Medication 2 GRAM(S): at 21:15

## 2019-02-27 RX ADMIN — SERTRALINE 50 MILLIGRAM(S): 25 TABLET, FILM COATED ORAL at 10:12

## 2019-02-27 RX ADMIN — Medication 0.5 MILLIGRAM(S): at 21:15

## 2019-02-27 RX ADMIN — OLANZAPINE 15 MILLIGRAM(S): 15 TABLET, FILM COATED ORAL at 21:14

## 2019-02-27 RX ADMIN — Medication 1 SPRAY(S): at 21:15

## 2019-02-27 RX ADMIN — Medication 2 GRAM(S): at 10:12

## 2019-02-27 NOTE — PROGRESS NOTE BEHAVIORAL HEALTH - NSBHFUPINTERVALCCFT_PSY_A_CORE
initially stated that he felt ill globally but later in the day clarified that his Left knee was bothering him and stated that he would use the Lidocaine patch
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
## 2019-02-27 NOTE — PROGRESS NOTE BEHAVIORAL HEALTH - NSBHCHARTREVIEWVS_PSY_A_CORE FT
Vital Signs Last 24 Hrs  T(C): 36.9 (27 Feb 2019 09:13), Max: 36.9 (27 Feb 2019 09:13)  T(F): 98.4 (27 Feb 2019 09:13), Max: 98.4 (27 Feb 2019 09:13)  HR: 77 (27 Feb 2019 09:13) (77 - 80)  BP: 126/83 (27 Feb 2019 09:13) (118/79 - 126/83)  BP(mean): --  RR: 20 (27 Feb 2019 09:13) (18 - 20)  SpO2: --
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
## 2019-02-27 NOTE — PROGRESS NOTE BEHAVIORAL HEALTH - NSBHFUPINTERVALHXFT_PSY_A_CORE
attends groups; anxious; preoccupied; blocked but mood is improved;  declines use of Topamax for appetite; no mention of s/h i/i/p or avh but somew blocking still

## 2019-02-28 PROCEDURE — 99232 SBSQ HOSP IP/OBS MODERATE 35: CPT

## 2019-02-28 RX ORDER — ACETAMINOPHEN 500 MG
650 TABLET ORAL EVERY 6 HOURS
Qty: 0 | Refills: 0 | Status: DISCONTINUED | OUTPATIENT
Start: 2019-02-28 | End: 2019-03-21

## 2019-02-28 RX ADMIN — Medication 0.5 MILLIGRAM(S): at 07:10

## 2019-02-28 RX ADMIN — Medication 1 SPRAY(S): at 21:31

## 2019-02-28 RX ADMIN — METHADONE HYDROCHLORIDE 20 MILLIGRAM(S): 40 TABLET ORAL at 10:31

## 2019-02-28 RX ADMIN — SERTRALINE 50 MILLIGRAM(S): 25 TABLET, FILM COATED ORAL at 10:31

## 2019-02-28 RX ADMIN — Medication 2 GRAM(S): at 10:31

## 2019-02-28 RX ADMIN — Medication 2 GRAM(S): at 21:30

## 2019-02-28 RX ADMIN — OLANZAPINE 15 MILLIGRAM(S): 15 TABLET, FILM COATED ORAL at 21:31

## 2019-02-28 RX ADMIN — Medication 2 MILLIGRAM(S): at 10:33

## 2019-02-28 RX ADMIN — Medication 0.5 MILLIGRAM(S): at 14:13

## 2019-02-28 RX ADMIN — Medication 0.5 MILLIGRAM(S): at 21:31

## 2019-02-28 RX ADMIN — Medication 50 MILLIGRAM(S): at 21:31

## 2019-02-28 RX ADMIN — OLANZAPINE 15 MILLIGRAM(S): 15 TABLET, FILM COATED ORAL at 10:31

## 2019-02-28 RX ADMIN — Medication 2 MILLIGRAM(S): at 21:31

## 2019-02-28 NOTE — PROGRESS NOTE BEHAVIORAL HEALTH - NSBHFUPINTERVALHXFT_PSY_A_CORE
anxious preoccupied ; good ADLs;  spends time in TV lounge with peers;  no s/h i/i/p but always vague and has a defensive tone.

## 2019-02-28 NOTE — PROGRESS NOTE BEHAVIORAL HEALTH - NSBHCHARTREVIEWVS_PSY_A_CORE FT
Vital Signs Last 24 Hrs  T(C): 37.2 (28 Feb 2019 10:00), Max: 37.2 (28 Feb 2019 10:00)  T(F): 98.9 (28 Feb 2019 10:00), Max: 98.9 (28 Feb 2019 10:00)  HR: 73 (28 Feb 2019 10:00) (73 - 76)  BP: 136/90 (28 Feb 2019 10:00) (122/79 - 136/90)  BP(mean): --  RR: 20 (28 Feb 2019 10:00) (18 - 20)  SpO2: --
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
## 2019-02-28 NOTE — PROGRESS NOTE BEHAVIORAL HEALTH - NSBHFUPINTERVALCCFT_PSY_A_CORE
use of Lidocaine patches helped his left knee.  Patient responds anxiously to any mention of [place].
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
## 2019-03-01 PROCEDURE — 99232 SBSQ HOSP IP/OBS MODERATE 35: CPT

## 2019-03-01 RX ADMIN — Medication 2 MILLIGRAM(S): at 21:49

## 2019-03-01 RX ADMIN — Medication 50 MILLIGRAM(S): at 21:49

## 2019-03-01 RX ADMIN — Medication 0.5 MILLIGRAM(S): at 21:49

## 2019-03-01 RX ADMIN — Medication 2 GRAM(S): at 10:51

## 2019-03-01 RX ADMIN — Medication 50 MILLIGRAM(S): at 18:03

## 2019-03-01 RX ADMIN — OLANZAPINE 15 MILLIGRAM(S): 15 TABLET, FILM COATED ORAL at 21:49

## 2019-03-01 RX ADMIN — Medication 1 SPRAY(S): at 22:12

## 2019-03-01 RX ADMIN — METHADONE HYDROCHLORIDE 20 MILLIGRAM(S): 40 TABLET ORAL at 10:51

## 2019-03-01 RX ADMIN — Medication 0.5 MILLIGRAM(S): at 15:17

## 2019-03-01 RX ADMIN — Medication 2 GRAM(S): at 21:49

## 2019-03-01 RX ADMIN — Medication 0.5 MILLIGRAM(S): at 07:29

## 2019-03-01 RX ADMIN — SERTRALINE 50 MILLIGRAM(S): 25 TABLET, FILM COATED ORAL at 10:51

## 2019-03-01 RX ADMIN — OLANZAPINE 15 MILLIGRAM(S): 15 TABLET, FILM COATED ORAL at 10:51

## 2019-03-01 NOTE — PROGRESS NOTE BEHAVIORAL HEALTH - NSBHCHARTREVIEWVS_PSY_A_CORE FT
Vital Signs Last 24 Hrs  T(C): 36.6 (28 Feb 2019 16:33), Max: 37.2 (28 Feb 2019 10:00)  T(F): 97.8 (28 Feb 2019 16:33), Max: 98.9 (28 Feb 2019 10:00)  HR: 73 (28 Feb 2019 16:33) (73 - 73)  BP: 129/82 (28 Feb 2019 16:33) (129/82 - 136/90)  BP(mean): --  RR: 18 (28 Feb 2019 16:33) (18 - 20)  SpO2: --
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
## 2019-03-01 NOTE — PROGRESS NOTE BEHAVIORAL HEALTH - NSBHFUPINTERVALHXFT_PSY_A_CORE
spend time by the TV; social with some others; anxious; attends groups; no c/o s/h i/i/p or avh;  however suspicious abrupt and still rather paranoid.

## 2019-03-02 RX ORDER — HALOPERIDOL DECANOATE 100 MG/ML
5 INJECTION INTRAMUSCULAR ONCE
Qty: 0 | Refills: 0 | Status: COMPLETED | OUTPATIENT
Start: 2019-03-02 | End: 2019-03-02

## 2019-03-02 RX ADMIN — Medication 2 MILLIGRAM(S): at 19:56

## 2019-03-02 RX ADMIN — Medication 2 GRAM(S): at 21:21

## 2019-03-02 RX ADMIN — Medication 0.5 MILLIGRAM(S): at 07:10

## 2019-03-02 RX ADMIN — METHADONE HYDROCHLORIDE 20 MILLIGRAM(S): 40 TABLET ORAL at 09:31

## 2019-03-02 RX ADMIN — Medication 2 MILLIGRAM(S): at 21:20

## 2019-03-02 RX ADMIN — HALOPERIDOL DECANOATE 5 MILLIGRAM(S): 100 INJECTION INTRAMUSCULAR at 19:55

## 2019-03-02 RX ADMIN — Medication 2 GRAM(S): at 09:32

## 2019-03-02 RX ADMIN — Medication 0.5 MILLIGRAM(S): at 21:20

## 2019-03-02 RX ADMIN — Medication 0.5 MILLIGRAM(S): at 14:10

## 2019-03-02 RX ADMIN — OLANZAPINE 15 MILLIGRAM(S): 15 TABLET, FILM COATED ORAL at 09:31

## 2019-03-02 RX ADMIN — SERTRALINE 50 MILLIGRAM(S): 25 TABLET, FILM COATED ORAL at 09:31

## 2019-03-02 RX ADMIN — Medication 50 MILLIGRAM(S): at 21:20

## 2019-03-02 RX ADMIN — OLANZAPINE 15 MILLIGRAM(S): 15 TABLET, FILM COATED ORAL at 21:21

## 2019-03-02 RX ADMIN — Medication 1 SPRAY(S): at 09:33

## 2019-03-03 RX ORDER — ALBUTEROL 90 UG/1
2 AEROSOL, METERED ORAL EVERY 6 HOURS
Qty: 0 | Refills: 0 | Status: DISCONTINUED | OUTPATIENT
Start: 2019-03-03 | End: 2019-03-21

## 2019-03-03 RX ADMIN — Medication 0.5 MILLIGRAM(S): at 21:20

## 2019-03-03 RX ADMIN — OLANZAPINE 15 MILLIGRAM(S): 15 TABLET, FILM COATED ORAL at 21:20

## 2019-03-03 RX ADMIN — SERTRALINE 50 MILLIGRAM(S): 25 TABLET, FILM COATED ORAL at 09:48

## 2019-03-03 RX ADMIN — Medication 2 GRAM(S): at 21:20

## 2019-03-03 RX ADMIN — ALBUTEROL 2 PUFF(S): 90 AEROSOL, METERED ORAL at 15:38

## 2019-03-03 RX ADMIN — METHADONE HYDROCHLORIDE 20 MILLIGRAM(S): 40 TABLET ORAL at 09:48

## 2019-03-03 RX ADMIN — OLANZAPINE 15 MILLIGRAM(S): 15 TABLET, FILM COATED ORAL at 09:48

## 2019-03-03 RX ADMIN — Medication 50 MILLIGRAM(S): at 21:20

## 2019-03-03 RX ADMIN — Medication 0.5 MILLIGRAM(S): at 14:06

## 2019-03-03 RX ADMIN — Medication 2 GRAM(S): at 09:48

## 2019-03-03 RX ADMIN — Medication 0.5 MILLIGRAM(S): at 06:44

## 2019-03-04 PROCEDURE — 99232 SBSQ HOSP IP/OBS MODERATE 35: CPT

## 2019-03-04 RX ORDER — METHADONE HYDROCHLORIDE 40 MG/1
20 TABLET ORAL DAILY
Qty: 0 | Refills: 0 | Status: DISCONTINUED | OUTPATIENT
Start: 2019-03-04 | End: 2019-03-11

## 2019-03-04 RX ORDER — CLONAZEPAM 1 MG
0.5 TABLET ORAL EVERY 8 HOURS
Qty: 0 | Refills: 0 | Status: DISCONTINUED | OUTPATIENT
Start: 2019-03-04 | End: 2019-03-11

## 2019-03-04 RX ORDER — TOPIRAMATE 25 MG
50 TABLET ORAL DAILY
Qty: 0 | Refills: 0 | Status: DISCONTINUED | OUTPATIENT
Start: 2019-03-04 | End: 2019-03-08

## 2019-03-04 RX ADMIN — SERTRALINE 50 MILLIGRAM(S): 25 TABLET, FILM COATED ORAL at 11:09

## 2019-03-04 RX ADMIN — Medication 2 GRAM(S): at 21:32

## 2019-03-04 RX ADMIN — Medication 0.5 MILLIGRAM(S): at 21:31

## 2019-03-04 RX ADMIN — METHADONE HYDROCHLORIDE 20 MILLIGRAM(S): 40 TABLET ORAL at 11:09

## 2019-03-04 RX ADMIN — Medication 2 MILLIGRAM(S): at 21:33

## 2019-03-04 RX ADMIN — Medication 2 MILLIGRAM(S): at 19:33

## 2019-03-04 RX ADMIN — Medication 0.5 MILLIGRAM(S): at 15:59

## 2019-03-04 RX ADMIN — METHADONE HYDROCHLORIDE 20 MILLIGRAM(S): 40 TABLET ORAL at 11:11

## 2019-03-04 RX ADMIN — Medication 2 GRAM(S): at 11:09

## 2019-03-04 RX ADMIN — Medication 0.5 MILLIGRAM(S): at 06:52

## 2019-03-04 RX ADMIN — OLANZAPINE 15 MILLIGRAM(S): 15 TABLET, FILM COATED ORAL at 21:32

## 2019-03-04 RX ADMIN — Medication 50 MILLIGRAM(S): at 21:32

## 2019-03-04 RX ADMIN — Medication 50 MILLIGRAM(S): at 23:25

## 2019-03-04 RX ADMIN — OLANZAPINE 15 MILLIGRAM(S): 15 TABLET, FILM COATED ORAL at 11:10
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
## 2019-03-04 NOTE — PROGRESS NOTE BEHAVIORAL HEALTH - NSBHCHARTREVIEWVS_PSY_A_CORE FT
Vital Signs Last 24 Hrs  T(C): 36.7 (03 Mar 2019 17:06), Max: 36.7 (03 Mar 2019 17:06)  T(F): 98 (03 Mar 2019 17:06), Max: 98 (03 Mar 2019 17:06)  HR: 78 (03 Mar 2019 17:06) (75 - 78)  BP: 125/77 (03 Mar 2019 17:06) (125/77 - 136/97)  BP(mean): --  RR: 18 (03 Mar 2019 17:06) (18 - 20)  SpO2: --

## 2019-03-04 NOTE — PROGRESS NOTE BEHAVIORAL HEALTH - NSBHFUPINTERVALHXFT_PSY_A_CORE
good ADLs;  spends time in TV lounge with peers;  no s/h i/i/p but always vague and has a defensive tone.
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
## 2019-03-05 PROCEDURE — 99232 SBSQ HOSP IP/OBS MODERATE 35: CPT

## 2019-03-05 RX ADMIN — Medication 0.5 MILLIGRAM(S): at 21:46

## 2019-03-05 RX ADMIN — OLANZAPINE 15 MILLIGRAM(S): 15 TABLET, FILM COATED ORAL at 09:27

## 2019-03-05 RX ADMIN — Medication 50 MILLIGRAM(S): at 21:46

## 2019-03-05 RX ADMIN — SERTRALINE 50 MILLIGRAM(S): 25 TABLET, FILM COATED ORAL at 09:29

## 2019-03-05 RX ADMIN — Medication 2 MILLIGRAM(S): at 10:45

## 2019-03-05 RX ADMIN — METHADONE HYDROCHLORIDE 20 MILLIGRAM(S): 40 TABLET ORAL at 09:27

## 2019-03-05 RX ADMIN — Medication 2 GRAM(S): at 09:27

## 2019-03-05 RX ADMIN — Medication 1 SPRAY(S): at 19:58

## 2019-03-05 RX ADMIN — Medication 0.5 MILLIGRAM(S): at 06:29

## 2019-03-05 RX ADMIN — Medication 0.5 MILLIGRAM(S): at 14:28

## 2019-03-05 RX ADMIN — Medication 2 GRAM(S): at 21:46

## 2019-03-05 RX ADMIN — OLANZAPINE 15 MILLIGRAM(S): 15 TABLET, FILM COATED ORAL at 21:46

## 2019-03-05 NOTE — PROGRESS NOTE BEHAVIORAL HEALTH - NSBHCHARTREVIEWVS_PSY_A_CORE FT
Vital Signs Last 24 Hrs  T(C): 36.8 (04 Mar 2019 17:00), Max: 36.8 (04 Mar 2019 17:00)  T(F): 98.3 (04 Mar 2019 17:00), Max: 98.3 (04 Mar 2019 17:00)  HR: 78 (04 Mar 2019 17:00) (78 - 78)  BP: 125/82 (04 Mar 2019 17:00) (125/82 - 133/93)  BP(mean): --  RR: 17 (04 Mar 2019 17:00) (17 - 17)  SpO2: --

## 2019-03-05 NOTE — PROGRESS NOTE BEHAVIORAL HEALTH - NSBHFUPINTERVALHXFT_PSY_A_CORE
walking about the hallway talking to another patient; more pleasant than before; anxious about where he is going; focused on opiates; good adls; paranoid thinking less prominent
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
## 2019-03-06 PROCEDURE — 99232 SBSQ HOSP IP/OBS MODERATE 35: CPT

## 2019-03-06 RX ORDER — OMEGA-3 ACID ETHYL ESTERS 1 G
2 CAPSULE ORAL
Qty: 0 | Refills: 0 | Status: DISCONTINUED | OUTPATIENT
Start: 2019-03-06 | End: 2019-03-21

## 2019-03-06 RX ORDER — LIDOCAINE 4 G/100G
1 CREAM TOPICAL DAILY
Qty: 0 | Refills: 0 | Status: DISCONTINUED | OUTPATIENT
Start: 2019-03-06 | End: 2019-03-21

## 2019-03-06 RX ADMIN — Medication 1 SPRAY(S): at 21:31

## 2019-03-06 RX ADMIN — SERTRALINE 50 MILLIGRAM(S): 25 TABLET, FILM COATED ORAL at 10:11

## 2019-03-06 RX ADMIN — Medication 2 MILLIGRAM(S): at 21:31

## 2019-03-06 RX ADMIN — METHADONE HYDROCHLORIDE 20 MILLIGRAM(S): 40 TABLET ORAL at 10:10

## 2019-03-06 RX ADMIN — Medication 1 SPRAY(S): at 10:13

## 2019-03-06 RX ADMIN — Medication 0.5 MILLIGRAM(S): at 16:00

## 2019-03-06 RX ADMIN — Medication 2 GRAM(S): at 10:10

## 2019-03-06 RX ADMIN — Medication 50 MILLIGRAM(S): at 21:31

## 2019-03-06 RX ADMIN — Medication 0.5 MILLIGRAM(S): at 21:31

## 2019-03-06 RX ADMIN — Medication 0.5 MILLIGRAM(S): at 06:32

## 2019-03-06 RX ADMIN — OLANZAPINE 15 MILLIGRAM(S): 15 TABLET, FILM COATED ORAL at 10:11

## 2019-03-06 RX ADMIN — OLANZAPINE 15 MILLIGRAM(S): 15 TABLET, FILM COATED ORAL at 21:31

## 2019-03-06 RX ADMIN — Medication 2 GRAM(S): at 21:31
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
## 2019-03-06 NOTE — PROGRESS NOTE BEHAVIORAL HEALTH - NSBHFUPINTERVALHXFT_PSY_A_CORE
walking about the hallway talking to another patient; more pleasant than before; anxious about where he is going

## 2019-03-06 NOTE — PROGRESS NOTE BEHAVIORAL HEALTH - NS ED BHA MED ROS MUSCULOSKELETAL
No complaints

## 2019-03-06 NOTE — PROGRESS NOTE BEHAVIORAL HEALTH - NSBHCHARTREVIEWVS_PSY_A_CORE FT
Vital Signs Last 24 Hrs  T(C): 37 (05 Mar 2019 16:24), Max: 37 (05 Mar 2019 16:24)  T(F): 98.6 (05 Mar 2019 16:24), Max: 98.6 (05 Mar 2019 16:24)  HR: 73 (05 Mar 2019 16:24) (73 - 73)  BP: 117/79 (05 Mar 2019 16:24) (117/79 - 122/84)  BP(mean): --  RR: 18 (05 Mar 2019 16:24) (18 - 18)  SpO2: --

## 2019-03-06 NOTE — PROGRESS NOTE BEHAVIORAL HEALTH - NSBHFUPINTERVALCCFT_PSY_A_CORE
placed a 3 day letter; no new complaints; wants to know about [facility] placed a 3 day letter; no new complaints; wants to know about [facility]; later rescinded.
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
## 2019-03-07 PROCEDURE — 99232 SBSQ HOSP IP/OBS MODERATE 35: CPT

## 2019-03-07 RX ADMIN — OLANZAPINE 15 MILLIGRAM(S): 15 TABLET, FILM COATED ORAL at 21:31

## 2019-03-07 RX ADMIN — Medication 0.5 MILLIGRAM(S): at 21:32

## 2019-03-07 RX ADMIN — Medication 0.5 MILLIGRAM(S): at 07:11

## 2019-03-07 RX ADMIN — HALOPERIDOL DECANOATE 5 MILLIGRAM(S): 100 INJECTION INTRAMUSCULAR at 10:37

## 2019-03-07 RX ADMIN — Medication 1 SPRAY(S): at 21:32

## 2019-03-07 RX ADMIN — Medication 2 GRAM(S): at 10:33

## 2019-03-07 RX ADMIN — Medication 0.5 MILLIGRAM(S): at 13:12

## 2019-03-07 RX ADMIN — Medication 2 MILLIGRAM(S): at 21:32

## 2019-03-07 RX ADMIN — Medication 2 GRAM(S): at 21:32

## 2019-03-07 RX ADMIN — METHADONE HYDROCHLORIDE 20 MILLIGRAM(S): 40 TABLET ORAL at 10:33

## 2019-03-07 RX ADMIN — Medication 50 MILLIGRAM(S): at 21:31

## 2019-03-07 RX ADMIN — OLANZAPINE 15 MILLIGRAM(S): 15 TABLET, FILM COATED ORAL at 10:34

## 2019-03-07 RX ADMIN — Medication 2 MILLIGRAM(S): at 13:12

## 2019-03-07 RX ADMIN — Medication 1 SPRAY(S): at 10:32

## 2019-03-07 RX ADMIN — SERTRALINE 50 MILLIGRAM(S): 25 TABLET, FILM COATED ORAL at 10:34
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
## 2019-03-07 NOTE — PROGRESS NOTE BEHAVIORAL HEALTH - NSBHCHARTREVIEWVS_PSY_A_CORE FT
Vital Signs Last 24 Hrs  T(C): 36.8 (06 Mar 2019 16:14), Max: 36.8 (06 Mar 2019 16:14)  T(F): 98.2 (06 Mar 2019 16:14), Max: 98.2 (06 Mar 2019 16:14)  HR: 72 (06 Mar 2019 16:14) (72 - 84)  BP: 124/78 (06 Mar 2019 16:14) (124/78 - 142/91)  BP(mean): --  RR: --  SpO2: --

## 2019-03-07 NOTE — PROGRESS NOTE BEHAVIORAL HEALTH - NSBHFUPINTERVALHXFT_PSY_A_CORE
spends time in the TV lounge; anxious; good eye contact; normal gait; oriented; speech clear and spontaneously;  no s/h i/i/p or avh but paranoid trends "Are you angry at me?"
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
## 2019-03-08 PROCEDURE — 99232 SBSQ HOSP IP/OBS MODERATE 35: CPT | Mod: 25

## 2019-03-08 PROCEDURE — 90853 GROUP PSYCHOTHERAPY: CPT

## 2019-03-08 RX ORDER — GABAPENTIN 400 MG/1
100 CAPSULE ORAL THREE TIMES A DAY
Qty: 0 | Refills: 0 | Status: DISCONTINUED | OUTPATIENT
Start: 2019-03-08 | End: 2019-03-21

## 2019-03-08 RX ADMIN — SERTRALINE 50 MILLIGRAM(S): 25 TABLET, FILM COATED ORAL at 10:36

## 2019-03-08 RX ADMIN — Medication 1 SPRAY(S): at 20:42

## 2019-03-08 RX ADMIN — OLANZAPINE 15 MILLIGRAM(S): 15 TABLET, FILM COATED ORAL at 10:36

## 2019-03-08 RX ADMIN — Medication 50 MILLIGRAM(S): at 20:38

## 2019-03-08 RX ADMIN — OLANZAPINE 15 MILLIGRAM(S): 15 TABLET, FILM COATED ORAL at 20:42

## 2019-03-08 RX ADMIN — Medication 0.5 MILLIGRAM(S): at 20:41

## 2019-03-08 RX ADMIN — HALOPERIDOL DECANOATE 5 MILLIGRAM(S): 100 INJECTION INTRAMUSCULAR at 20:42

## 2019-03-08 RX ADMIN — Medication 2 MILLIGRAM(S): at 14:06

## 2019-03-08 RX ADMIN — Medication 2 GRAM(S): at 10:36

## 2019-03-08 RX ADMIN — METHADONE HYDROCHLORIDE 20 MILLIGRAM(S): 40 TABLET ORAL at 10:36

## 2019-03-08 RX ADMIN — GABAPENTIN 100 MILLIGRAM(S): 400 CAPSULE ORAL at 20:41

## 2019-03-08 RX ADMIN — Medication 0.5 MILLIGRAM(S): at 14:05

## 2019-03-08 RX ADMIN — Medication 0.5 MILLIGRAM(S): at 07:19

## 2019-03-08 RX ADMIN — Medication 2 GRAM(S): at 20:38
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
## 2019-03-08 NOTE — PROGRESS NOTE BEHAVIORAL HEALTH - NSBHCHARTREVIEWVS_PSY_A_CORE FT
Vital Signs Last 24 Hrs  T(C): 37.2 (07 Mar 2019 17:20), Max: 37.2 (07 Mar 2019 17:20)  T(F): 98.9 (07 Mar 2019 17:20), Max: 98.9 (07 Mar 2019 17:20)  HR: 80 (07 Mar 2019 17:20) (80 - 84)  BP: 111/73 (07 Mar 2019 17:20) (111/73 - 131/86)  BP(mean): --  RR: 20 (07 Mar 2019 09:39) (20 - 20)  SpO2: --

## 2019-03-09 RX ADMIN — GABAPENTIN 100 MILLIGRAM(S): 400 CAPSULE ORAL at 21:20

## 2019-03-09 RX ADMIN — METHADONE HYDROCHLORIDE 20 MILLIGRAM(S): 40 TABLET ORAL at 10:00

## 2019-03-09 RX ADMIN — OLANZAPINE 15 MILLIGRAM(S): 15 TABLET, FILM COATED ORAL at 21:25

## 2019-03-09 RX ADMIN — OLANZAPINE 15 MILLIGRAM(S): 15 TABLET, FILM COATED ORAL at 10:01

## 2019-03-09 RX ADMIN — HALOPERIDOL DECANOATE 5 MILLIGRAM(S): 100 INJECTION INTRAMUSCULAR at 21:22

## 2019-03-09 RX ADMIN — Medication 0.5 MILLIGRAM(S): at 13:13

## 2019-03-09 RX ADMIN — Medication 2 MILLIGRAM(S): at 10:04

## 2019-03-09 RX ADMIN — GABAPENTIN 100 MILLIGRAM(S): 400 CAPSULE ORAL at 07:30

## 2019-03-09 RX ADMIN — Medication 0.5 MILLIGRAM(S): at 07:30

## 2019-03-09 RX ADMIN — Medication 2 GRAM(S): at 21:21

## 2019-03-09 RX ADMIN — Medication 1 SPRAY(S): at 21:20

## 2019-03-09 RX ADMIN — HALOPERIDOL DECANOATE 5 MILLIGRAM(S): 100 INJECTION INTRAMUSCULAR at 12:18

## 2019-03-09 RX ADMIN — Medication 0.5 MILLIGRAM(S): at 21:20

## 2019-03-09 RX ADMIN — Medication 2 MILLIGRAM(S): at 21:22

## 2019-03-09 RX ADMIN — GABAPENTIN 100 MILLIGRAM(S): 400 CAPSULE ORAL at 13:13

## 2019-03-09 RX ADMIN — SERTRALINE 50 MILLIGRAM(S): 25 TABLET, FILM COATED ORAL at 10:01

## 2019-03-09 RX ADMIN — Medication 50 MILLIGRAM(S): at 21:22

## 2019-03-09 RX ADMIN — Medication 2 GRAM(S): at 10:01

## 2019-03-10 RX ADMIN — Medication 0.5 MILLIGRAM(S): at 07:16

## 2019-03-10 RX ADMIN — OLANZAPINE 15 MILLIGRAM(S): 15 TABLET, FILM COATED ORAL at 21:18

## 2019-03-10 RX ADMIN — GABAPENTIN 100 MILLIGRAM(S): 400 CAPSULE ORAL at 13:01

## 2019-03-10 RX ADMIN — Medication 1 SPRAY(S): at 11:32

## 2019-03-10 RX ADMIN — GABAPENTIN 100 MILLIGRAM(S): 400 CAPSULE ORAL at 09:45

## 2019-03-10 RX ADMIN — Medication 2 GRAM(S): at 09:45

## 2019-03-10 RX ADMIN — METHADONE HYDROCHLORIDE 20 MILLIGRAM(S): 40 TABLET ORAL at 09:46

## 2019-03-10 RX ADMIN — LIDOCAINE 1 PATCH: 4 CREAM TOPICAL at 13:01

## 2019-03-10 RX ADMIN — Medication 2 GRAM(S): at 21:15

## 2019-03-10 RX ADMIN — Medication 1 SPRAY(S): at 21:16

## 2019-03-10 RX ADMIN — OLANZAPINE 15 MILLIGRAM(S): 15 TABLET, FILM COATED ORAL at 09:46

## 2019-03-10 RX ADMIN — SERTRALINE 50 MILLIGRAM(S): 25 TABLET, FILM COATED ORAL at 09:45

## 2019-03-10 RX ADMIN — Medication 0.5 MILLIGRAM(S): at 21:15

## 2019-03-10 RX ADMIN — Medication 0.5 MILLIGRAM(S): at 13:01

## 2019-03-10 RX ADMIN — HALOPERIDOL DECANOATE 5 MILLIGRAM(S): 100 INJECTION INTRAMUSCULAR at 21:15

## 2019-03-10 RX ADMIN — GABAPENTIN 100 MILLIGRAM(S): 400 CAPSULE ORAL at 21:15

## 2019-03-11 PROCEDURE — 99232 SBSQ HOSP IP/OBS MODERATE 35: CPT

## 2019-03-11 RX ORDER — CLONAZEPAM 1 MG
0.5 TABLET ORAL EVERY 8 HOURS
Qty: 0 | Refills: 0 | Status: DISCONTINUED | OUTPATIENT
Start: 2019-03-11 | End: 2019-03-15

## 2019-03-11 RX ORDER — METHADONE HYDROCHLORIDE 40 MG/1
20 TABLET ORAL DAILY
Qty: 0 | Refills: 0 | Status: DISCONTINUED | OUTPATIENT
Start: 2019-03-11 | End: 2019-03-15

## 2019-03-11 RX ADMIN — Medication 50 MILLIGRAM(S): at 22:36

## 2019-03-11 RX ADMIN — Medication 0.5 MILLIGRAM(S): at 13:33

## 2019-03-11 RX ADMIN — OLANZAPINE 15 MILLIGRAM(S): 15 TABLET, FILM COATED ORAL at 22:39

## 2019-03-11 RX ADMIN — GABAPENTIN 100 MILLIGRAM(S): 400 CAPSULE ORAL at 09:49

## 2019-03-11 RX ADMIN — Medication 0.5 MILLIGRAM(S): at 07:07

## 2019-03-11 RX ADMIN — Medication 2 GRAM(S): at 22:36

## 2019-03-11 RX ADMIN — SERTRALINE 50 MILLIGRAM(S): 25 TABLET, FILM COATED ORAL at 09:49

## 2019-03-11 RX ADMIN — GABAPENTIN 100 MILLIGRAM(S): 400 CAPSULE ORAL at 22:36

## 2019-03-11 RX ADMIN — Medication 0.5 MILLIGRAM(S): at 22:36

## 2019-03-11 RX ADMIN — Medication 2 MILLIGRAM(S): at 22:36

## 2019-03-11 RX ADMIN — OLANZAPINE 15 MILLIGRAM(S): 15 TABLET, FILM COATED ORAL at 09:50

## 2019-03-11 RX ADMIN — Medication 2 GRAM(S): at 09:50

## 2019-03-11 RX ADMIN — GABAPENTIN 100 MILLIGRAM(S): 400 CAPSULE ORAL at 12:12

## 2019-03-11 RX ADMIN — METHADONE HYDROCHLORIDE 20 MILLIGRAM(S): 40 TABLET ORAL at 09:49

## 2019-03-11 NOTE — PROGRESS NOTE BEHAVIORAL HEALTH - NSBHCHARTREVIEWVS_PSY_A_CORE FT
Vital Signs Last 24 Hrs  T(C): 36.5 (11 Mar 2019 09:03), Max: 37 (10 Mar 2019 17:00)  T(F): 97.7 (11 Mar 2019 09:03), Max: 98.6 (10 Mar 2019 17:00)  HR: 64 (11 Mar 2019 09:03) (64 - 74)  BP: 146/95 (11 Mar 2019 09:03) (122/76 - 146/95)  BP(mean): --  RR: 20 (11 Mar 2019 09:03) (17 - 20)  SpO2: --

## 2019-03-11 NOTE — PROGRESS NOTE BEHAVIORAL HEALTH - NSBHFUPINTERVALCCFT_PSY_A_CORE
"Are you trying to kill me?"  states he is encouraged to express his feelings.  Came to the door knocking politely.  However was directible. "Are you trying to kill me?"  states he is encouraged to express his feelings.  Came to the door knocking politely.  However was directible and somewhat apologetic.

## 2019-03-11 NOTE — PROGRESS NOTE BEHAVIORAL HEALTH - NSBHFUPINTERVALHXFT_PSY_A_CORE
patient is observed going to all groups; socializing appropriately;  good adls;  somewhat anxious but thought congruent;  able express feelings but not avoiding the writer despite "fears"
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
## 2019-03-12 PROCEDURE — 99232 SBSQ HOSP IP/OBS MODERATE 35: CPT

## 2019-03-12 RX ADMIN — Medication 2 GRAM(S): at 21:34

## 2019-03-12 RX ADMIN — HALOPERIDOL DECANOATE 5 MILLIGRAM(S): 100 INJECTION INTRAMUSCULAR at 21:36

## 2019-03-12 RX ADMIN — GABAPENTIN 100 MILLIGRAM(S): 400 CAPSULE ORAL at 10:00

## 2019-03-12 RX ADMIN — METHADONE HYDROCHLORIDE 20 MILLIGRAM(S): 40 TABLET ORAL at 09:54

## 2019-03-12 RX ADMIN — Medication 0.5 MILLIGRAM(S): at 21:34

## 2019-03-12 RX ADMIN — SERTRALINE 50 MILLIGRAM(S): 25 TABLET, FILM COATED ORAL at 10:00

## 2019-03-12 RX ADMIN — Medication 2 MILLIGRAM(S): at 14:23

## 2019-03-12 RX ADMIN — GABAPENTIN 100 MILLIGRAM(S): 400 CAPSULE ORAL at 21:34

## 2019-03-12 RX ADMIN — GABAPENTIN 100 MILLIGRAM(S): 400 CAPSULE ORAL at 14:18

## 2019-03-12 RX ADMIN — Medication 0.5 MILLIGRAM(S): at 06:47

## 2019-03-12 RX ADMIN — Medication 0.5 MILLIGRAM(S): at 14:18

## 2019-03-12 RX ADMIN — Medication 2 MILLIGRAM(S): at 20:01

## 2019-03-12 RX ADMIN — Medication 2 GRAM(S): at 10:00

## 2019-03-12 RX ADMIN — OLANZAPINE 15 MILLIGRAM(S): 15 TABLET, FILM COATED ORAL at 10:36

## 2019-03-12 RX ADMIN — OLANZAPINE 15 MILLIGRAM(S): 15 TABLET, FILM COATED ORAL at 21:34
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
## 2019-03-12 NOTE — PROGRESS NOTE BEHAVIORAL HEALTH - NSBHFUPINTERVALHXFT_PSY_A_CORE
lying in bed; anxious; adls good; avoids eye contact; guarded; however no mention of s/h i/i/p or avh or "cameras";
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
## 2019-03-12 NOTE — PROGRESS NOTE BEHAVIORAL HEALTH - NSBHFUPINTERVALCCFT_PSY_A_CORE
"but what about [facility]?"  no other statements.  informed that patient when in group with psychologist  made mention of cameras and being paranoid  IF he goes to nursing home.

## 2019-03-12 NOTE — PROGRESS NOTE BEHAVIORAL HEALTH - NSBHCHARTREVIEWVS_PSY_A_CORE FT
Vital Signs Last 24 Hrs  T(C): 37 (11 Mar 2019 17:25), Max: 37 (11 Mar 2019 17:25)  T(F): 98.6 (11 Mar 2019 17:25), Max: 98.6 (11 Mar 2019 17:25)  HR: 76 (11 Mar 2019 17:25) (64 - 76)  BP: 123/78 (11 Mar 2019 17:25) (123/78 - 146/95)  BP(mean): --  RR: 18 (11 Mar 2019 17:25) (18 - 20)  SpO2: --

## 2019-03-13 PROCEDURE — 99232 SBSQ HOSP IP/OBS MODERATE 35: CPT

## 2019-03-13 RX ADMIN — METHADONE HYDROCHLORIDE 20 MILLIGRAM(S): 40 TABLET ORAL at 10:32

## 2019-03-13 RX ADMIN — HALOPERIDOL DECANOATE 5 MILLIGRAM(S): 100 INJECTION INTRAMUSCULAR at 21:26

## 2019-03-13 RX ADMIN — Medication 0.5 MILLIGRAM(S): at 07:16

## 2019-03-13 RX ADMIN — OLANZAPINE 15 MILLIGRAM(S): 15 TABLET, FILM COATED ORAL at 21:28

## 2019-03-13 RX ADMIN — Medication 2 GRAM(S): at 21:25

## 2019-03-13 RX ADMIN — OLANZAPINE 15 MILLIGRAM(S): 15 TABLET, FILM COATED ORAL at 09:33

## 2019-03-13 RX ADMIN — Medication 2 GRAM(S): at 10:33

## 2019-03-13 RX ADMIN — GABAPENTIN 100 MILLIGRAM(S): 400 CAPSULE ORAL at 14:21

## 2019-03-13 RX ADMIN — SERTRALINE 50 MILLIGRAM(S): 25 TABLET, FILM COATED ORAL at 10:36

## 2019-03-13 RX ADMIN — Medication 0.5 MILLIGRAM(S): at 21:25

## 2019-03-13 RX ADMIN — GABAPENTIN 100 MILLIGRAM(S): 400 CAPSULE ORAL at 21:25

## 2019-03-13 RX ADMIN — Medication 2 MILLIGRAM(S): at 21:27

## 2019-03-13 RX ADMIN — GABAPENTIN 100 MILLIGRAM(S): 400 CAPSULE ORAL at 09:33

## 2019-03-13 RX ADMIN — Medication 0.5 MILLIGRAM(S): at 14:22

## 2019-03-13 NOTE — PROGRESS NOTE BEHAVIORAL HEALTH - THOUGHT PROCESS
2nd unit of blood in progress, senait first unit without problem, pt more awake, denies pain, still cold, pulses weak, doppler pedal, sat's 100% on ear   Linear

## 2019-03-13 NOTE — PROGRESS NOTE BEHAVIORAL HEALTH - NSBHFUPINTERVALHXFT_PSY_A_CORE
staff reports routine use of Haldol which calms the patient;   lying in bed; anxious; adls good; avoids eye contact; guarded; however no mention of s/h i/i/p or avh ; anxious and suspicious;
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
## 2019-03-13 NOTE — PROGRESS NOTE BEHAVIORAL HEALTH - NSBHCHARTREVIEWVS_PSY_A_CORE FT
Vital Signs Last 24 Hrs  T(C): 36.8 (12 Mar 2019 16:13), Max: 36.8 (12 Mar 2019 16:13)  T(F): 98.2 (12 Mar 2019 16:13), Max: 98.2 (12 Mar 2019 16:13)  HR: 74 (12 Mar 2019 16:13) (74 - 74)  BP: 112/77 (12 Mar 2019 16:13) (112/77 - 112/77)  BP(mean): --  RR: 18 (12 Mar 2019 16:13) (18 - 18)  SpO2: --
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
## 2019-03-14 PROCEDURE — 99232 SBSQ HOSP IP/OBS MODERATE 35: CPT

## 2019-03-14 RX ORDER — DIPHENHYDRAMINE HCL 50 MG
50 CAPSULE ORAL EVERY 6 HOURS
Qty: 0 | Refills: 0 | Status: DISCONTINUED | OUTPATIENT
Start: 2019-03-14 | End: 2019-03-21

## 2019-03-14 RX ORDER — SERTRALINE 25 MG/1
100 TABLET, FILM COATED ORAL DAILY
Qty: 0 | Refills: 0 | Status: DISCONTINUED | OUTPATIENT
Start: 2019-03-14 | End: 2019-03-21

## 2019-03-14 RX ORDER — NICOTINE POLACRILEX 2 MG
2 GUM BUCCAL
Qty: 0 | Refills: 0 | Status: DISCONTINUED | OUTPATIENT
Start: 2019-03-14 | End: 2019-03-21

## 2019-03-14 RX ORDER — SODIUM CHLORIDE 0.65 %
1 AEROSOL, SPRAY (ML) NASAL
Qty: 0 | Refills: 0 | Status: DISCONTINUED | OUTPATIENT
Start: 2019-03-14 | End: 2019-03-21

## 2019-03-14 RX ADMIN — HALOPERIDOL DECANOATE 5 MILLIGRAM(S): 100 INJECTION INTRAMUSCULAR at 17:20

## 2019-03-14 RX ADMIN — METHADONE HYDROCHLORIDE 20 MILLIGRAM(S): 40 TABLET ORAL at 09:49

## 2019-03-14 RX ADMIN — Medication 50 MILLIGRAM(S): at 21:18

## 2019-03-14 RX ADMIN — OLANZAPINE 15 MILLIGRAM(S): 15 TABLET, FILM COATED ORAL at 21:18

## 2019-03-14 RX ADMIN — Medication 0.5 MILLIGRAM(S): at 13:09

## 2019-03-14 RX ADMIN — Medication 2 MILLIGRAM(S): at 21:18

## 2019-03-14 RX ADMIN — Medication 0.5 MILLIGRAM(S): at 21:17

## 2019-03-14 RX ADMIN — Medication 2 GRAM(S): at 09:49

## 2019-03-14 RX ADMIN — GABAPENTIN 100 MILLIGRAM(S): 400 CAPSULE ORAL at 21:17

## 2019-03-14 RX ADMIN — SERTRALINE 50 MILLIGRAM(S): 25 TABLET, FILM COATED ORAL at 09:49

## 2019-03-14 RX ADMIN — Medication 2 MILLIGRAM(S): at 09:53

## 2019-03-14 RX ADMIN — OLANZAPINE 15 MILLIGRAM(S): 15 TABLET, FILM COATED ORAL at 09:50

## 2019-03-14 RX ADMIN — GABAPENTIN 100 MILLIGRAM(S): 400 CAPSULE ORAL at 09:49

## 2019-03-14 RX ADMIN — Medication 50 MILLIGRAM(S): at 13:09

## 2019-03-14 RX ADMIN — Medication 2 GRAM(S): at 21:17

## 2019-03-14 RX ADMIN — Medication 0.5 MILLIGRAM(S): at 06:33

## 2019-03-14 RX ADMIN — GABAPENTIN 100 MILLIGRAM(S): 400 CAPSULE ORAL at 13:09

## 2019-03-14 NOTE — PROGRESS NOTE BEHAVIORAL HEALTH - NSBHFUPINTERVALHXFT_PSY_A_CORE
staff reports continued  routine use of Haldol which calms the patient;   lying in bed; anxious; adls good; avoids eye contact; guarded; however no mention of s/h i/i/p or avh ; anxious and suspicious;

## 2019-03-14 NOTE — PROGRESS NOTE BEHAVIORAL HEALTH - NSBHCHARTREVIEWVS_PSY_A_CORE FT
Vital Signs Last 24 Hrs  T(C): 36.8 (13 Mar 2019 17:00), Max: 36.8 (13 Mar 2019 17:00)  T(F): 98.3 (13 Mar 2019 17:00), Max: 98.3 (13 Mar 2019 17:00)  HR: 80 (13 Mar 2019 17:00) (80 - 80)  BP: 124/84 (13 Mar 2019 17:00) (124/84 - 124/84)  BP(mean): --  RR: 18 (13 Mar 2019 17:00) (18 - 18)  SpO2: --
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
## 2019-03-14 NOTE — PROGRESS NOTE BEHAVIORAL HEALTH - NSBHFUPINTERVALCCFT_PSY_A_CORE
no complaints or comments no complaints or comments.  States he asks for Haldol every evening as it calms him.
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
## 2019-03-15 PROCEDURE — 99232 SBSQ HOSP IP/OBS MODERATE 35: CPT

## 2019-03-15 RX ORDER — HYDROXYZINE HCL 10 MG
50 TABLET ORAL EVERY 6 HOURS
Qty: 0 | Refills: 0 | Status: DISCONTINUED | OUTPATIENT
Start: 2019-03-15 | End: 2019-03-21

## 2019-03-15 RX ORDER — CLONAZEPAM 1 MG
0.5 TABLET ORAL EVERY 8 HOURS
Qty: 0 | Refills: 0 | Status: DISCONTINUED | OUTPATIENT
Start: 2019-03-15 | End: 2019-03-21

## 2019-03-15 RX ORDER — METHADONE HYDROCHLORIDE 40 MG/1
20 TABLET ORAL DAILY
Qty: 0 | Refills: 0 | Status: DISCONTINUED | OUTPATIENT
Start: 2019-03-15 | End: 2019-03-21

## 2019-03-15 RX ADMIN — Medication 2 GRAM(S): at 09:52

## 2019-03-15 RX ADMIN — OLANZAPINE 15 MILLIGRAM(S): 15 TABLET, FILM COATED ORAL at 21:33

## 2019-03-15 RX ADMIN — Medication 0.5 MILLIGRAM(S): at 07:22

## 2019-03-15 RX ADMIN — GABAPENTIN 100 MILLIGRAM(S): 400 CAPSULE ORAL at 09:52

## 2019-03-15 RX ADMIN — METHADONE HYDROCHLORIDE 20 MILLIGRAM(S): 40 TABLET ORAL at 09:51

## 2019-03-15 RX ADMIN — HALOPERIDOL DECANOATE 5 MILLIGRAM(S): 100 INJECTION INTRAMUSCULAR at 21:35

## 2019-03-15 RX ADMIN — Medication 2 GRAM(S): at 21:32

## 2019-03-15 RX ADMIN — GABAPENTIN 100 MILLIGRAM(S): 400 CAPSULE ORAL at 21:32

## 2019-03-15 RX ADMIN — Medication 0.5 MILLIGRAM(S): at 14:18

## 2019-03-15 RX ADMIN — SERTRALINE 100 MILLIGRAM(S): 25 TABLET, FILM COATED ORAL at 09:52

## 2019-03-15 RX ADMIN — GABAPENTIN 100 MILLIGRAM(S): 400 CAPSULE ORAL at 14:20

## 2019-03-15 RX ADMIN — Medication 0.5 MILLIGRAM(S): at 21:32

## 2019-03-15 RX ADMIN — OLANZAPINE 15 MILLIGRAM(S): 15 TABLET, FILM COATED ORAL at 09:52

## 2019-03-15 NOTE — PROGRESS NOTE BEHAVIORAL HEALTH - NSBHCHARTREVIEWVS_PSY_A_CORE FT
Vital Signs Last 24 Hrs  T(C): 36.4 (15 Mar 2019 09:01), Max: 37 (14 Mar 2019 16:21)  T(F): 97.6 (15 Mar 2019 09:01), Max: 98.6 (14 Mar 2019 16:21)  HR: 77 (15 Mar 2019 09:01) (71 - 77)  BP: 143/87 (15 Mar 2019 09:01) (116/80 - 143/87)  BP(mean): --  RR: 20 (15 Mar 2019 09:01) (18 - 20)  SpO2: --

## 2019-03-15 NOTE — PROGRESS NOTE BEHAVIORAL HEALTH - NSBHFUPINTERVALCCFT_PSY_A_CORE
focused on going to [place]; aware that letter prepared for continuity of care.  expects to leave sometime next week
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
## 2019-03-15 NOTE — PROGRESS NOTE BEHAVIORAL HEALTH - NSBHFUPINTERVALHXFT_PSY_A_CORE
sitting in bed; fair eye contact; good adls; no s/h i/i/p or avh; however guarded somewhat but dc focused
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
## 2019-03-16 RX ADMIN — SERTRALINE 100 MILLIGRAM(S): 25 TABLET, FILM COATED ORAL at 09:34

## 2019-03-16 RX ADMIN — HALOPERIDOL DECANOATE 5 MILLIGRAM(S): 100 INJECTION INTRAMUSCULAR at 21:33

## 2019-03-16 RX ADMIN — Medication 0.5 MILLIGRAM(S): at 15:53

## 2019-03-16 RX ADMIN — Medication 2 MILLIGRAM(S): at 10:18

## 2019-03-16 RX ADMIN — OLANZAPINE 15 MILLIGRAM(S): 15 TABLET, FILM COATED ORAL at 09:34

## 2019-03-16 RX ADMIN — GABAPENTIN 100 MILLIGRAM(S): 400 CAPSULE ORAL at 15:53

## 2019-03-16 RX ADMIN — OLANZAPINE 15 MILLIGRAM(S): 15 TABLET, FILM COATED ORAL at 21:32

## 2019-03-16 RX ADMIN — GABAPENTIN 100 MILLIGRAM(S): 400 CAPSULE ORAL at 21:33

## 2019-03-16 RX ADMIN — Medication 50 MILLIGRAM(S): at 22:16

## 2019-03-16 RX ADMIN — Medication 0.5 MILLIGRAM(S): at 21:32

## 2019-03-16 RX ADMIN — Medication 0.5 MILLIGRAM(S): at 06:42

## 2019-03-16 RX ADMIN — Medication 2 GRAM(S): at 09:34

## 2019-03-16 RX ADMIN — METHADONE HYDROCHLORIDE 20 MILLIGRAM(S): 40 TABLET ORAL at 09:34

## 2019-03-16 RX ADMIN — GABAPENTIN 100 MILLIGRAM(S): 400 CAPSULE ORAL at 11:13

## 2019-03-16 RX ADMIN — Medication 2 GRAM(S): at 21:33

## 2019-03-16 RX ADMIN — Medication 2 MILLIGRAM(S): at 22:16

## 2019-03-17 RX ADMIN — Medication 50 MILLIGRAM(S): at 21:53

## 2019-03-17 RX ADMIN — Medication 0.5 MILLIGRAM(S): at 21:53

## 2019-03-17 RX ADMIN — Medication 2 GRAM(S): at 10:02

## 2019-03-17 RX ADMIN — Medication 2 GRAM(S): at 21:53

## 2019-03-17 RX ADMIN — GABAPENTIN 100 MILLIGRAM(S): 400 CAPSULE ORAL at 13:21

## 2019-03-17 RX ADMIN — GABAPENTIN 100 MILLIGRAM(S): 400 CAPSULE ORAL at 10:02

## 2019-03-17 RX ADMIN — Medication 2 MILLIGRAM(S): at 21:53

## 2019-03-17 RX ADMIN — METHADONE HYDROCHLORIDE 20 MILLIGRAM(S): 40 TABLET ORAL at 10:02

## 2019-03-17 RX ADMIN — Medication 0.5 MILLIGRAM(S): at 06:58

## 2019-03-17 RX ADMIN — GABAPENTIN 100 MILLIGRAM(S): 400 CAPSULE ORAL at 21:53

## 2019-03-17 RX ADMIN — OLANZAPINE 15 MILLIGRAM(S): 15 TABLET, FILM COATED ORAL at 21:53

## 2019-03-17 RX ADMIN — OLANZAPINE 15 MILLIGRAM(S): 15 TABLET, FILM COATED ORAL at 10:02

## 2019-03-17 RX ADMIN — SERTRALINE 100 MILLIGRAM(S): 25 TABLET, FILM COATED ORAL at 10:02

## 2019-03-17 RX ADMIN — Medication 0.5 MILLIGRAM(S): at 13:21

## 2019-03-18 PROCEDURE — 99232 SBSQ HOSP IP/OBS MODERATE 35: CPT

## 2019-03-18 RX ADMIN — GABAPENTIN 100 MILLIGRAM(S): 400 CAPSULE ORAL at 13:49

## 2019-03-18 RX ADMIN — Medication 0.5 MILLIGRAM(S): at 07:09

## 2019-03-18 RX ADMIN — HALOPERIDOL DECANOATE 5 MILLIGRAM(S): 100 INJECTION INTRAMUSCULAR at 21:23

## 2019-03-18 RX ADMIN — METHADONE HYDROCHLORIDE 20 MILLIGRAM(S): 40 TABLET ORAL at 10:07

## 2019-03-18 RX ADMIN — Medication 2 MILLIGRAM(S): at 10:07

## 2019-03-18 RX ADMIN — Medication 0.5 MILLIGRAM(S): at 21:22

## 2019-03-18 RX ADMIN — GABAPENTIN 100 MILLIGRAM(S): 400 CAPSULE ORAL at 10:06

## 2019-03-18 RX ADMIN — OLANZAPINE 15 MILLIGRAM(S): 15 TABLET, FILM COATED ORAL at 21:23

## 2019-03-18 RX ADMIN — Medication 0.5 MILLIGRAM(S): at 13:49

## 2019-03-18 RX ADMIN — OLANZAPINE 15 MILLIGRAM(S): 15 TABLET, FILM COATED ORAL at 10:07

## 2019-03-18 RX ADMIN — GABAPENTIN 100 MILLIGRAM(S): 400 CAPSULE ORAL at 21:23

## 2019-03-18 RX ADMIN — Medication 2 GRAM(S): at 10:07

## 2019-03-18 RX ADMIN — Medication 2 GRAM(S): at 21:23

## 2019-03-18 RX ADMIN — SERTRALINE 100 MILLIGRAM(S): 25 TABLET, FILM COATED ORAL at 10:06

## 2019-03-18 RX ADMIN — Medication 1 SPRAY(S): at 10:07

## 2019-03-18 RX ADMIN — LIDOCAINE 1 PATCH: 4 CREAM TOPICAL at 10:52

## 2019-03-18 RX ADMIN — Medication 2 MILLIGRAM(S): at 21:23

## 2019-03-18 RX ADMIN — Medication 1 SPRAY(S): at 21:23
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
## 2019-03-18 NOTE — PROGRESS NOTE BEHAVIORAL HEALTH - NSBHCHARTREVIEWVS_PSY_A_CORE FT
Vital Signs Last 24 Hrs  T(C): 36.5 (18 Mar 2019 08:58), Max: 37.1 (17 Mar 2019 16:02)  T(F): 97.7 (18 Mar 2019 08:58), Max: 98.8 (17 Mar 2019 16:02)  HR: 76 (18 Mar 2019 08:58) (71 - 76)  BP: 129/89 (18 Mar 2019 08:58) (118/80 - 129/89)  BP(mean): --  RR: 20 (18 Mar 2019 08:58) (18 - 20)  SpO2: --

## 2019-03-18 NOTE — PROGRESS NOTE BEHAVIORAL HEALTH - NSBHFUPINTERVALCCFT_PSY_A_CORE
mentions knee pain and reminded that Lidocaine patches are available;   focused on when is he leaving with expectation to go to [place]; aware that a letter with recommendations is being prepared; no complaints

## 2019-03-18 NOTE — PROGRESS NOTE BEHAVIORAL HEALTH - NSBHFUPINTERVALHXFT_PSY_A_CORE
mood continues to improve; social with other patients; good adls; no s/h i/i/p or avh; however somewaht anxious but a little less guarded somewhat but dc focused; no mention of ideas that staff wants to harm him.   No avoidant behavior.
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
## 2019-03-19 PROCEDURE — 99232 SBSQ HOSP IP/OBS MODERATE 35: CPT

## 2019-03-19 RX ADMIN — Medication 2 GRAM(S): at 21:10

## 2019-03-19 RX ADMIN — Medication 1 SPRAY(S): at 09:45

## 2019-03-19 RX ADMIN — HALOPERIDOL DECANOATE 5 MILLIGRAM(S): 100 INJECTION INTRAMUSCULAR at 21:11

## 2019-03-19 RX ADMIN — Medication 0.5 MILLIGRAM(S): at 13:45

## 2019-03-19 RX ADMIN — Medication 2 GRAM(S): at 09:46

## 2019-03-19 RX ADMIN — GABAPENTIN 100 MILLIGRAM(S): 400 CAPSULE ORAL at 21:11

## 2019-03-19 RX ADMIN — GABAPENTIN 100 MILLIGRAM(S): 400 CAPSULE ORAL at 09:46

## 2019-03-19 RX ADMIN — Medication 0.5 MILLIGRAM(S): at 07:37

## 2019-03-19 RX ADMIN — GABAPENTIN 100 MILLIGRAM(S): 400 CAPSULE ORAL at 13:45

## 2019-03-19 RX ADMIN — SERTRALINE 100 MILLIGRAM(S): 25 TABLET, FILM COATED ORAL at 09:46

## 2019-03-19 RX ADMIN — Medication 0.5 MILLIGRAM(S): at 21:11

## 2019-03-19 RX ADMIN — OLANZAPINE 15 MILLIGRAM(S): 15 TABLET, FILM COATED ORAL at 21:10

## 2019-03-19 RX ADMIN — METHADONE HYDROCHLORIDE 20 MILLIGRAM(S): 40 TABLET ORAL at 09:45

## 2019-03-19 RX ADMIN — OLANZAPINE 15 MILLIGRAM(S): 15 TABLET, FILM COATED ORAL at 09:46

## 2019-03-19 RX ADMIN — Medication 2 MILLIGRAM(S): at 13:45

## 2019-03-19 NOTE — PROGRESS NOTE BEHAVIORAL HEALTH - NSBHFUPINTERVALCCFT_PSY_A_CORE
continues to complain of knee pain ann on Left.  prompted to use the patch;  otherwise no complaints.  mood continues to improve; social with other patients; good adls; no s/h i/i/p or avh; however somewaht anxious but somewhat guarded  and anxious ; however no statements that others want to harm him.
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
## 2019-03-19 NOTE — PROGRESS NOTE BEHAVIORAL HEALTH - NSBHCHARTREVIEWVS_PSY_A_CORE FT
Vital Signs Last 24 Hrs  T(C): 36.9 (18 Mar 2019 16:54), Max: 36.9 (18 Mar 2019 16:54)  T(F): 98.5 (18 Mar 2019 16:54), Max: 98.5 (18 Mar 2019 16:54)  HR: 78 (18 Mar 2019 16:54) (76 - 78)  BP: 126/82 (18 Mar 2019 16:54) (126/82 - 129/89)  BP(mean): --  RR: 18 (18 Mar 2019 16:54) (18 - 20)  SpO2: --

## 2019-03-19 NOTE — PROGRESS NOTE BEHAVIORAL HEALTH - NSBHFUPINTERVALHXFT_PSY_A_CORE
continues to complain of knee pain ann on Left.  prompted to use the patch;  otherwise no complaints.  mood continues to improve

## 2019-03-20 PROCEDURE — 99232 SBSQ HOSP IP/OBS MODERATE 35: CPT

## 2019-03-20 RX ORDER — OMEGA-3 ACID ETHYL ESTERS 1 G
2 CAPSULE ORAL
Qty: 120 | Refills: 0
Start: 2019-03-20 | End: 2019-04-18

## 2019-03-20 RX ORDER — ALBUTEROL 90 UG/1
2 AEROSOL, METERED ORAL
Qty: 1 | Refills: 0 | OUTPATIENT
Start: 2019-03-20

## 2019-03-20 RX ORDER — NICOTINE POLACRILEX 2 MG
2 GUM BUCCAL
Qty: 720 | Refills: 0
Start: 2019-03-20 | End: 2019-04-18

## 2019-03-20 RX ORDER — METHADONE HYDROCHLORIDE 40 MG/1
2 TABLET ORAL
Qty: 0 | Refills: 0 | DISCHARGE
Start: 2019-03-20

## 2019-03-20 RX ORDER — GABAPENTIN 400 MG/1
1 CAPSULE ORAL
Qty: 90 | Refills: 0
Start: 2019-03-20 | End: 2019-04-18

## 2019-03-20 RX ORDER — ALBUTEROL 90 UG/1
2 AEROSOL, METERED ORAL
Qty: 1 | Refills: 0
Start: 2019-03-20

## 2019-03-20 RX ORDER — OLANZAPINE 15 MG/1
1 TABLET, FILM COATED ORAL
Qty: 0 | Refills: 0 | DISCHARGE
Start: 2019-03-20

## 2019-03-20 RX ORDER — SERTRALINE 25 MG/1
1 TABLET, FILM COATED ORAL
Qty: 30 | Refills: 0
Start: 2019-03-20 | End: 2019-04-18

## 2019-03-20 RX ORDER — OLANZAPINE 15 MG/1
3 TABLET, FILM COATED ORAL
Qty: 90 | Refills: 0
Start: 2019-03-20 | End: 2019-04-18

## 2019-03-20 RX ORDER — LIDOCAINE 4 G/100G
1 CREAM TOPICAL
Qty: 30 | Refills: 0
Start: 2019-03-20 | End: 2019-04-18

## 2019-03-20 RX ADMIN — Medication 2 GRAM(S): at 21:13

## 2019-03-20 RX ADMIN — HALOPERIDOL DECANOATE 5 MILLIGRAM(S): 100 INJECTION INTRAMUSCULAR at 12:28

## 2019-03-20 RX ADMIN — Medication 2 MILLIGRAM(S): at 21:16

## 2019-03-20 RX ADMIN — LIDOCAINE 1 PATCH: 4 CREAM TOPICAL at 15:06

## 2019-03-20 RX ADMIN — Medication 0.5 MILLIGRAM(S): at 07:05

## 2019-03-20 RX ADMIN — GABAPENTIN 100 MILLIGRAM(S): 400 CAPSULE ORAL at 13:57

## 2019-03-20 RX ADMIN — SERTRALINE 100 MILLIGRAM(S): 25 TABLET, FILM COATED ORAL at 10:09

## 2019-03-20 RX ADMIN — Medication 0.5 MILLIGRAM(S): at 13:57

## 2019-03-20 RX ADMIN — Medication 2 GRAM(S): at 10:10

## 2019-03-20 RX ADMIN — OLANZAPINE 15 MILLIGRAM(S): 15 TABLET, FILM COATED ORAL at 21:13

## 2019-03-20 RX ADMIN — GABAPENTIN 100 MILLIGRAM(S): 400 CAPSULE ORAL at 10:09

## 2019-03-20 RX ADMIN — OLANZAPINE 15 MILLIGRAM(S): 15 TABLET, FILM COATED ORAL at 10:09

## 2019-03-20 RX ADMIN — METHADONE HYDROCHLORIDE 20 MILLIGRAM(S): 40 TABLET ORAL at 10:09

## 2019-03-20 RX ADMIN — HALOPERIDOL DECANOATE 5 MILLIGRAM(S): 100 INJECTION INTRAMUSCULAR at 21:12

## 2019-03-20 RX ADMIN — GABAPENTIN 100 MILLIGRAM(S): 400 CAPSULE ORAL at 21:12

## 2019-03-20 RX ADMIN — Medication 1 SPRAY(S): at 21:12

## 2019-03-20 RX ADMIN — Medication 0.5 MILLIGRAM(S): at 21:12
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
## 2019-03-20 NOTE — PROGRESS NOTE BEHAVIORAL HEALTH - NSBHFUPINTERVALCCFT_PSY_A_CORE
anxious about leaving ; no complaints; aware d/c tomorrow or Friday anxious about leaving ; no complaints; aware d/c tomorrow or Friday.  Confirmed later in day to be picked up by  at 1pm .  Patient aware.
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
## 2019-03-20 NOTE — PROGRESS NOTE BEHAVIORAL HEALTH - NSBHFUPINTERVALHXFT_PSY_A_CORE
seen walking about the unit; anxious; no s/h  i/i/p or avh; no tremor; normal gait; cog intact; speech clear; social with others

## 2019-03-20 NOTE — PROGRESS NOTE BEHAVIORAL HEALTH - NSBHCHARTREVIEWVS_PSY_A_CORE FT
Vital Signs Last 24 Hrs  T(C): 36.8 (19 Mar 2019 17:11), Max: 37 (19 Mar 2019 10:00)  T(F): 98.3 (19 Mar 2019 17:11), Max: 98.6 (19 Mar 2019 10:00)  HR: 76 (19 Mar 2019 17:11) (75 - 76)  BP: 126/80 (19 Mar 2019 17:11) (110/75 - 126/80)  BP(mean): --  RR: 18 (19 Mar 2019 17:11) (18 - 20)  SpO2: --

## 2019-03-21 VITALS — HEART RATE: 80 BPM | SYSTOLIC BLOOD PRESSURE: 114 MMHG | DIASTOLIC BLOOD PRESSURE: 80 MMHG | TEMPERATURE: 98 F

## 2019-03-21 LAB
HCT VFR BLD CALC: 40.5 % — SIGNIFICANT CHANGE UP (ref 39–50)
HGB BLD-MCNC: 13.5 G/DL — SIGNIFICANT CHANGE UP (ref 13–17)
MCHC RBC-ENTMCNC: 27.9 PG — SIGNIFICANT CHANGE UP (ref 27–34)
MCHC RBC-ENTMCNC: 33.3 GM/DL — SIGNIFICANT CHANGE UP (ref 32–36)
MCV RBC AUTO: 83.7 FL — SIGNIFICANT CHANGE UP (ref 80–100)
NRBC # BLD: 0 /100 WBCS — SIGNIFICANT CHANGE UP (ref 0–0)
PLATELET # BLD AUTO: 257 K/UL — SIGNIFICANT CHANGE UP (ref 150–400)
RBC # BLD: 4.84 M/UL — SIGNIFICANT CHANGE UP (ref 4.2–5.8)
RBC # FLD: 14 % — SIGNIFICANT CHANGE UP (ref 10.3–14.5)
WBC # BLD: 6.09 K/UL — SIGNIFICANT CHANGE UP (ref 3.8–10.5)
WBC # FLD AUTO: 6.09 K/UL — SIGNIFICANT CHANGE UP (ref 3.8–10.5)

## 2019-03-21 PROCEDURE — 80342 ANTIPSYCHOTICS NOS 1-3: CPT

## 2019-03-21 PROCEDURE — 82607 VITAMIN B-12: CPT

## 2019-03-21 PROCEDURE — 94640 AIRWAY INHALATION TREATMENT: CPT

## 2019-03-21 PROCEDURE — 80048 BASIC METABOLIC PNL TOTAL CA: CPT

## 2019-03-21 PROCEDURE — 71045 X-RAY EXAM CHEST 1 VIEW: CPT

## 2019-03-21 PROCEDURE — 83036 HEMOGLOBIN GLYCOSYLATED A1C: CPT

## 2019-03-21 PROCEDURE — 80053 COMPREHEN METABOLIC PANEL: CPT

## 2019-03-21 PROCEDURE — 93005 ELECTROCARDIOGRAM TRACING: CPT

## 2019-03-21 PROCEDURE — 36415 COLL VENOUS BLD VENIPUNCTURE: CPT

## 2019-03-21 PROCEDURE — 99285 EMERGENCY DEPT VISIT HI MDM: CPT | Mod: 25

## 2019-03-21 PROCEDURE — 82652 VIT D 1 25-DIHYDROXY: CPT

## 2019-03-21 PROCEDURE — 81003 URINALYSIS AUTO W/O SCOPE: CPT

## 2019-03-21 PROCEDURE — 86780 TREPONEMA PALLIDUM: CPT

## 2019-03-21 PROCEDURE — 87389 HIV-1 AG W/HIV-1&-2 AB AG IA: CPT

## 2019-03-21 PROCEDURE — 99238 HOSP IP/OBS DSCHRG MGMT 30/<: CPT

## 2019-03-21 PROCEDURE — 73560 X-RAY EXAM OF KNEE 1 OR 2: CPT

## 2019-03-21 PROCEDURE — 82746 ASSAY OF FOLIC ACID SERUM: CPT

## 2019-03-21 PROCEDURE — 80061 LIPID PANEL: CPT

## 2019-03-21 PROCEDURE — 85027 COMPLETE CBC AUTOMATED: CPT

## 2019-03-21 PROCEDURE — 93010 ELECTROCARDIOGRAM REPORT: CPT

## 2019-03-21 PROCEDURE — 84443 ASSAY THYROID STIM HORMONE: CPT

## 2019-03-21 PROCEDURE — 80307 DRUG TEST PRSMV CHEM ANLYZR: CPT

## 2019-03-21 PROCEDURE — 85025 COMPLETE CBC W/AUTO DIFF WBC: CPT

## 2019-03-21 RX ORDER — CLONAZEPAM 1 MG
1 TABLET ORAL
Qty: 21 | Refills: 0
Start: 2019-03-21 | End: 2019-03-27

## 2019-03-21 RX ORDER — HALOPERIDOL DECANOATE 100 MG/ML
1 INJECTION INTRAMUSCULAR
Qty: 30 | Refills: 0
Start: 2019-03-21 | End: 2019-04-19

## 2019-03-21 RX ADMIN — SERTRALINE 100 MILLIGRAM(S): 25 TABLET, FILM COATED ORAL at 10:33

## 2019-03-21 RX ADMIN — OLANZAPINE 15 MILLIGRAM(S): 15 TABLET, FILM COATED ORAL at 10:33

## 2019-03-21 RX ADMIN — METHADONE HYDROCHLORIDE 20 MILLIGRAM(S): 40 TABLET ORAL at 10:33

## 2019-03-21 RX ADMIN — Medication 2 GRAM(S): at 10:33

## 2019-03-21 RX ADMIN — Medication 0.5 MILLIGRAM(S): at 07:07

## 2019-03-21 RX ADMIN — HALOPERIDOL DECANOATE 5 MILLIGRAM(S): 100 INJECTION INTRAMUSCULAR at 10:37

## 2019-03-21 RX ADMIN — GABAPENTIN 100 MILLIGRAM(S): 400 CAPSULE ORAL at 10:33

## 2019-03-21 RX ADMIN — Medication 0.5 MILLIGRAM(S): at 13:03

## 2019-03-21 RX ADMIN — GABAPENTIN 100 MILLIGRAM(S): 400 CAPSULE ORAL at 13:03

## 2019-03-21 NOTE — PROGRESS NOTE BEHAVIORAL HEALTH - NSBHADMITDANGERSELF_PSY_A_CORE
suicidal ideation with plan and means
unable to care for self
suicidal ideation with plan and means
unable to care for self
suicidal ideation with plan and means
suicidal ideation with plan and means
unable to care for self
suicidal ideation with plan and means
unable to care for self
suicidal ideation with plan and means
suicidal ideation with plan and means
unable to care for self
suicidal ideation with plan and means
unable to care for self
suicidal ideation with plan and means

## 2019-03-21 NOTE — PROGRESS NOTE BEHAVIORAL HEALTH - NSBHADMITIPBHPROVIDER_PSY_A_CORE
N/A/attempted to contact Suboxone provider; no return calls
attempted to contact Suboxone provider; no return calls/N/A
N/A/attempted to contact Suboxone provider; no return calls
N/A/attempted to contact Suboxone provider; no return calls
attempted to contact Suboxone provider; no return calls/N/A
N/A/attempted to contact Suboxone provider; no return calls
attempted to contact Suboxone provider; no return calls/N/A
N/A/attempted to contact Suboxone provider; no return calls
attempted to contact Suboxone provider; no return calls/N/A
N/A/attempted to contact Suboxone provider; no return calls
attempted to contact Suboxone provider; no return calls/N/A
N/A/attempted to contact Suboxone provider; no return calls
N/A/attempted to contact Suboxone provider; no return calls
attempted to contact Suboxone provider; no return calls/N/A
N/A/attempted to contact Suboxone provider; no return calls
attempted to contact Suboxone provider; no return calls/N/A
N/A/attempted to contact Suboxone provider; no return calls
N/A/attempted to contact Suboxone provider; no return calls
attempted to contact Suboxone provider; no return calls/N/A
N/A/attempted to contact Suboxone provider; no return calls
attempted to contact Suboxone provider; no return calls/N/A
attempting to contact Suboxone provider/N/A
N/A/attempted to contact Suboxone provider; no return calls
attempted to contact Suboxone provider; no return calls/N/A
N/A/attempted to contact Suboxone provider; no return calls
N/A/attempted to contact Suboxone provider; no return calls
attempted to contact Suboxone provider; no return calls/N/A
N/A/attempted to contact Suboxone provider; no return calls
N/A/attempted to contact Suboxone provider; no return calls
attempted to contact Suboxone provider; no return calls/N/A
attempted to contact Suboxone provider; no return calls/N/A
N/A/attempted to contact Suboxone provider; no return calls
attempted to contact Suboxone provider; no return calls/N/A
N/A/attempted to contact Suboxone provider; no return calls
attempted to contact Suboxone provider; no return calls/N/A
attempting to contact Suboxone provider/N/A
N/A/attempted to contact Suboxone provider; no return calls
attempted to contact Suboxone provider; no return calls/N/A
attempted to contact Suboxone provider; no return calls/N/A
N/A/attempted to contact Suboxone provider; no return calls
N/A/attempting to contact Suboxone provider
attempted to contact Suboxone provider; no return calls/N/A
attempting to contact Suboxone provider/N/A
N/A/attempted to contact Suboxone provider; no return calls
N/A/attempting to contact Suboxone provider
attempted to contact Suboxone provider; no return calls/N/A
attempted to contact Suboxone provider; no return calls/N/A

## 2019-03-21 NOTE — PROGRESS NOTE BEHAVIORAL HEALTH - NSBHCHARTREVIEWIMAGING_PSY_A_CORE FT
CXR (12/09/2018) ;EXAM: XR CHEST AP OR PA 1V ; ;PROCEDURE DATE: 12/09/2018 ; ;INTERPRETATION: Portable CXR dated 12/9/2018 4:27 PM ; ;CLINICAL INFORMATION: Screening ; ;PRIOR STUDIES: Chest radiograph 10/8/2014. ; ;FINDINGS: ;The heart is not enlarged. The cardiomediastinal silhouette is normal in ;appearance. No consolidation. No pleural effusions. No pneumothorax.. No ;acute abnormalities of the soft tissues and osseous structures. Upper ;abdomen is unremarkable. ; ;IMPRESSION: ;Unremarkable portable chest radiograph. ; ;TRACEY SUAREZ M.D., RADIOLOGY RESIDENT ;This document has been electronically signed. ;COSMO MAYER M.D., ATTENDING RADIOLOGIST ;This document has been electronically signed. Dec 10 2018 5:02PM
EXAM: XR KNEE-LEFT-1 OR 2 VIEWS ; ;PROCEDURE DATE: 01/24/2019 ; ; ; ;INTERPRETATION: CLINICAL INFORMATION: swelling around L knee. ; ;TECHNIQUE: 2 views of the left knee ; ;COMPARISON: None available ; ;FINDINGS: ; ;There is no acute fracture or dislocation. Joint spaces and alignment are ;maintained. A moderate joint effusion is present. 1/25/19
CXR (12/09/2018) ;EXAM: XR CHEST AP OR PA 1V ; ;PROCEDURE DATE: 12/09/2018 ; ;INTERPRETATION: Portable CXR dated 12/9/2018 4:27 PM ; ;CLINICAL INFORMATION: Screening ; ;PRIOR STUDIES: Chest radiograph 10/8/2014. ; ;FINDINGS: ;The heart is not enlarged. The cardiomediastinal silhouette is normal in ;appearance. No consolidation. No pleural effusions. No pneumothorax.. No ;acute abnormalities of the soft tissues and osseous structures. Upper ;abdomen is unremarkable. ; ;IMPRESSION: ;Unremarkable portable chest radiograph. ; ;TRACEY SUAREZ M.D., RADIOLOGY RESIDENT ;This document has been electronically signed. ;COSMO MAYER M.D., ATTENDING RADIOLOGIST ;This document has been electronically signed. Dec 10 2018 5:02PM
EXAM: XR KNEE-LEFT-1 OR 2 VIEWS ; ;PROCEDURE DATE: 01/24/2019 ; ; ; ;INTERPRETATION: CLINICAL INFORMATION: swelling around L knee. ; ;TECHNIQUE: 2 views of the left knee ; ;COMPARISON: None available ; ;FINDINGS: ; ;There is no acute fracture or dislocation. Joint spaces and alignment are ;maintained. A moderate joint effusion is present. 1/25/19
CXR (12/09/2018) ;EXAM: XR CHEST AP OR PA 1V ; ;PROCEDURE DATE: 12/09/2018 ; ;INTERPRETATION: Portable CXR dated 12/9/2018 4:27 PM ; ;CLINICAL INFORMATION: Screening ; ;PRIOR STUDIES: Chest radiograph 10/8/2014. ; ;FINDINGS: ;The heart is not enlarged. The cardiomediastinal silhouette is normal in ;appearance. No consolidation. No pleural effusions. No pneumothorax.. No ;acute abnormalities of the soft tissues and osseous structures. Upper ;abdomen is unremarkable. ; ;IMPRESSION: ;Unremarkable portable chest radiograph. ; ;TRACEY SUAREZ M.D., RADIOLOGY RESIDENT ;This document has been electronically signed. ;COSMO MAYER M.D., ATTENDING RADIOLOGIST ;This document has been electronically signed. Dec 10 2018 5:02PM
CXR (12/09/2018) ;EXAM: XR CHEST AP OR PA 1V ; ;PROCEDURE DATE: 12/09/2018 ; ;INTERPRETATION: Portable CXR dated 12/9/2018 4:27 PM ; ;CLINICAL INFORMATION: Screening ; ;PRIOR STUDIES: Chest radiograph 10/8/2014. ; ;FINDINGS: ;The heart is not enlarged. The cardiomediastinal silhouette is normal in ;appearance. No consolidation. No pleural effusions. No pneumothorax.. No ;acute abnormalities of the soft tissues and osseous structures. Upper ;abdomen is unremarkable. ; ;IMPRESSION: ;Unremarkable portable chest radiograph. ; ;TRACEY SUAREZ M.D., RADIOLOGY RESIDENT ;This document has been electronically signed. ;COSMO MAYER M.D., ATTENDING RADIOLOGIST ;This document has been electronically signed. Dec 10 2018 5:02PM
EXAM: XR KNEE-LEFT-1 OR 2 VIEWS ; ;PROCEDURE DATE: 01/24/2019 ; ; ; ;INTERPRETATION: CLINICAL INFORMATION: swelling around L knee. ; ;TECHNIQUE: 2 views of the left knee ; ;COMPARISON: None available ; ;FINDINGS: ; ;There is no acute fracture or dislocation. Joint spaces and alignment are ;maintained. A moderate joint effusion is present. 1/25/19
CXR (12/09/2018) ;EXAM: XR CHEST AP OR PA 1V ; ;PROCEDURE DATE: 12/09/2018 ; ;INTERPRETATION: Portable CXR dated 12/9/2018 4:27 PM ; ;CLINICAL INFORMATION: Screening ; ;PRIOR STUDIES: Chest radiograph 10/8/2014. ; ;FINDINGS: ;The heart is not enlarged. The cardiomediastinal silhouette is normal in ;appearance. No consolidation. No pleural effusions. No pneumothorax.. No ;acute abnormalities of the soft tissues and osseous structures. Upper ;abdomen is unremarkable. ; ;IMPRESSION: ;Unremarkable portable chest radiograph. ; ;TRACEY SUAREZ M.D., RADIOLOGY RESIDENT ;This document has been electronically signed. ;COSMO MAYER M.D., ATTENDING RADIOLOGIST ;This document has been electronically signed. Dec 10 2018 5:02PM
EXAM: XR KNEE-LEFT-1 OR 2 VIEWS ; ;PROCEDURE DATE: 01/24/2019 ; ; ; ;INTERPRETATION: CLINICAL INFORMATION: swelling around L knee. ; ;TECHNIQUE: 2 views of the left knee ; ;COMPARISON: None available ; ;FINDINGS: ; ;There is no acute fracture or dislocation. Joint spaces and alignment are ;maintained. A moderate joint effusion is present. 1/25/19
CXR (12/09/2018)  EXAM:  XR CHEST AP OR PA 1V                           PROCEDURE DATE:  12/09/2018      INTERPRETATION:  Portable CXR dated 12/9/2018 4:27 PM    CLINICAL INFORMATION: Screening    PRIOR STUDIES: Chest radiograph 10/8/2014.    FINDINGS:   The heart is not enlarged. The cardiomediastinal silhouette is normal in   appearance. No consolidation. No pleural effusions. No pneumothorax.. No   acute abnormalities of the soft tissues and osseous structures. Upper   abdomen is unremarkable.    IMPRESSION:  Unremarkable portable chest radiograph.    "Thank you for the opportunity to participate in the care of this   patient."    TRACEY SUAREZ M.D., RADIOLOGY RESIDENT  This document has been electronically signed.  COSMO MAYER M.D., ATTENDING RADIOLOGIST  This document has been electronically signed.  Dec 10 2018  5:02PM
EXAM: XR KNEE-LEFT-1 OR 2 VIEWS ; ;PROCEDURE DATE: 01/24/2019 ; ; ; ;INTERPRETATION: CLINICAL INFORMATION: swelling around L knee. ; ;TECHNIQUE: 2 views of the left knee ; ;COMPARISON: None available ; ;FINDINGS: ; ;There is no acute fracture or dislocation. Joint spaces and alignment are ;maintained. A moderate joint effusion is present. 1/25/19
EXAM: XR KNEE-LEFT-1 OR 2 VIEWS ; ;PROCEDURE DATE: 01/24/2019 ; ; ; ;INTERPRETATION: CLINICAL INFORMATION: swelling around L knee. ; ;TECHNIQUE: 2 views of the left knee ; ;COMPARISON: None available ; ;FINDINGS: ; ;There is no acute fracture or dislocation. Joint spaces and alignment are ;maintained. A moderate joint effusion is present. 1/25/19
CXR (12/09/2018) ;EXAM: XR CHEST AP OR PA 1V ; ;PROCEDURE DATE: 12/09/2018 ; ;INTERPRETATION: Portable CXR dated 12/9/2018 4:27 PM ; ;CLINICAL INFORMATION: Screening ; ;PRIOR STUDIES: Chest radiograph 10/8/2014. ; ;FINDINGS: ;The heart is not enlarged. The cardiomediastinal silhouette is normal in ;appearance. No consolidation. No pleural effusions. No pneumothorax.. No ;acute abnormalities of the soft tissues and osseous structures. Upper ;abdomen is unremarkable. ; ;IMPRESSION: ;Unremarkable portable chest radiograph. ; ;TRACEY SUAREZ M.D., RADIOLOGY RESIDENT ;This document has been electronically signed. ;COSMO MAYER M.D., ATTENDING RADIOLOGIST ;This document has been electronically signed. Dec 10 2018 5:02PM
CXR (12/09/2018)  EXAM:  XR CHEST AP OR PA 1V                           PROCEDURE DATE:  12/09/2018      INTERPRETATION:  Portable CXR dated 12/9/2018 4:27 PM    CLINICAL INFORMATION: Screening    PRIOR STUDIES: Chest radiograph 10/8/2014.    FINDINGS:   The heart is not enlarged. The cardiomediastinal silhouette is normal in   appearance. No consolidation. No pleural effusions. No pneumothorax.. No   acute abnormalities of the soft tissues and osseous structures. Upper   abdomen is unremarkable.    IMPRESSION:  Unremarkable portable chest radiograph.    "Thank you for the opportunity to participate in the care of this   patient."    TRACEY SUAREZ M.D., RADIOLOGY RESIDENT  This document has been electronically signed.  COSMO MAYER M.D., ATTENDING RADIOLOGIST  This document has been electronically signed.  Dec 10 2018  5:02PM
CXR (12/09/2018)  EXAM:  XR CHEST AP OR PA 1V                           PROCEDURE DATE:  12/09/2018      INTERPRETATION:  Portable CXR dated 12/9/2018 4:27 PM    CLINICAL INFORMATION: Screening    PRIOR STUDIES: Chest radiograph 10/8/2014.    FINDINGS:   The heart is not enlarged. The cardiomediastinal silhouette is normal in   appearance. No consolidation. No pleural effusions. No pneumothorax.. No   acute abnormalities of the soft tissues and osseous structures. Upper   abdomen is unremarkable.    IMPRESSION:  Unremarkable portable chest radiograph.    "Thank you for the opportunity to participate in the care of this   patient."    TRACEY SUAREZ M.D., RADIOLOGY RESIDENT  This document has been electronically signed.  COSMO MAYER M.D., ATTENDING RADIOLOGIST  This document has been electronically signed.  Dec 10 2018  5:02PM
CXR (12/09/2018) ;EXAM: XR CHEST AP OR PA 1V ; ;PROCEDURE DATE: 12/09/2018 ; ;INTERPRETATION: Portable CXR dated 12/9/2018 4:27 PM ; ;CLINICAL INFORMATION: Screening ; ;PRIOR STUDIES: Chest radiograph 10/8/2014. ; ;FINDINGS: ;The heart is not enlarged. The cardiomediastinal silhouette is normal in ;appearance. No consolidation. No pleural effusions. No pneumothorax.. No ;acute abnormalities of the soft tissues and osseous structures. Upper ;abdomen is unremarkable. ; ;IMPRESSION: ;Unremarkable portable chest radiograph. ; ;TRACEY SUAREZ M.D., RADIOLOGY RESIDENT ;This document has been electronically signed. ;COSMO MAYER M.D., ATTENDING RADIOLOGIST ;This document has been electronically signed. Dec 10 2018 5:02PM
EXAM: XR KNEE-LEFT-1 OR 2 VIEWS ; ;PROCEDURE DATE: 01/24/2019 ; ; ; ;INTERPRETATION: CLINICAL INFORMATION: swelling around L knee. ; ;TECHNIQUE: 2 views of the left knee ; ;COMPARISON: None available ; ;FINDINGS: ; ;There is no acute fracture or dislocation. Joint spaces and alignment are ;maintained. A moderate joint effusion is present. 1/25/19
CXR (12/09/2018) ;EXAM: XR CHEST AP OR PA 1V ; ;PROCEDURE DATE: 12/09/2018 ; ;INTERPRETATION: Portable CXR dated 12/9/2018 4:27 PM ; ;CLINICAL INFORMATION: Screening ; ;PRIOR STUDIES: Chest radiograph 10/8/2014. ; ;FINDINGS: ;The heart is not enlarged. The cardiomediastinal silhouette is normal in ;appearance. No consolidation. No pleural effusions. No pneumothorax.. No ;acute abnormalities of the soft tissues and osseous structures. Upper ;abdomen is unremarkable. ; ;IMPRESSION: ;Unremarkable portable chest radiograph. ; ;TRACEY SUAREZ M.D., RADIOLOGY RESIDENT ;This document has been electronically signed. ;COSMO MAYER M.D., ATTENDING RADIOLOGIST ;This document has been electronically signed. Dec 10 2018 5:02PM
EXAM: XR KNEE-LEFT-1 OR 2 VIEWS ; ;PROCEDURE DATE: 01/24/2019 ; ; ; ;INTERPRETATION: CLINICAL INFORMATION: swelling around L knee. ; ;TECHNIQUE: 2 views of the left knee ; ;COMPARISON: None available ; ;FINDINGS: ; ;There is no acute fracture or dislocation. Joint spaces and alignment are ;maintained. A moderate joint effusion is present. 1/25/19
CXR (12/09/2018) ;EXAM: XR CHEST AP OR PA 1V ; ;PROCEDURE DATE: 12/09/2018 ; ;INTERPRETATION: Portable CXR dated 12/9/2018 4:27 PM ; ;CLINICAL INFORMATION: Screening ; ;PRIOR STUDIES: Chest radiograph 10/8/2014. ; ;FINDINGS: ;The heart is not enlarged. The cardiomediastinal silhouette is normal in ;appearance. No consolidation. No pleural effusions. No pneumothorax.. No ;acute abnormalities of the soft tissues and osseous structures. Upper ;abdomen is unremarkable. ; ;IMPRESSION: ;Unremarkable portable chest radiograph. ; ;TRACEY SUAREZ M.D., RADIOLOGY RESIDENT ;This document has been electronically signed. ;COSMO MAYER M.D., ATTENDING RADIOLOGIST ;This document has been electronically signed. Dec 10 2018 5:02PM
EXAM: XR KNEE-LEFT-1 OR 2 VIEWS ; ;PROCEDURE DATE: 01/24/2019 ; ; ; ;INTERPRETATION: CLINICAL INFORMATION: swelling around L knee. ; ;TECHNIQUE: 2 views of the left knee ; ;COMPARISON: None available ; ;FINDINGS: ; ;There is no acute fracture or dislocation. Joint spaces and alignment are ;maintained. A moderate joint effusion is present. 1/25/19
CXR (12/09/2018) ;EXAM: XR CHEST AP OR PA 1V ; ;PROCEDURE DATE: 12/09/2018 ; ;INTERPRETATION: Portable CXR dated 12/9/2018 4:27 PM ; ;CLINICAL INFORMATION: Screening ; ;PRIOR STUDIES: Chest radiograph 10/8/2014. ; ;FINDINGS: ;The heart is not enlarged. The cardiomediastinal silhouette is normal in ;appearance. No consolidation. No pleural effusions. No pneumothorax.. No ;acute abnormalities of the soft tissues and osseous structures. Upper ;abdomen is unremarkable. ; ;IMPRESSION: ;Unremarkable portable chest radiograph. ; ;TRACEY SUAREZ M.D., RADIOLOGY RESIDENT ;This document has been electronically signed. ;COSMO MAYER M.D., ATTENDING RADIOLOGIST ;This document has been electronically signed. Dec 10 2018 5:02PM
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
## 2019-03-21 NOTE — PROGRESS NOTE BEHAVIORAL HEALTH - PROBLEM SELECTOR PROBLEM 4
Cigarette nicotine dependence without complication
Substance abuse
Cigarette nicotine dependence without complication
Schizoaffective disorder, unspecified type
Cigarette nicotine dependence without complication
Anxiety disorder, unspecified type
Cigarette nicotine dependence without complication
Schizoaffective disorder, unspecified type
Cigarette nicotine dependence without complication
Antisocial personality disorder in adult
Substance abuse
Schizoaffective disorder, unspecified type

## 2019-03-21 NOTE — PROGRESS NOTE BEHAVIORAL HEALTH - NS ED BHA REVIEW OF ED CHART VITAL SIGNS REVIEWED
Yes

## 2019-03-21 NOTE — PROGRESS NOTE BEHAVIORAL HEALTH - NSBHTIMEBASEDBILLING_PSY_A_CORE
yes...
no
yes...
no
yes...
no
yes...
yes...
no
yes...
yes...
no
yes...
yes...
no

## 2019-03-21 NOTE — PROGRESS NOTE BEHAVIORAL HEALTH - NSBHADMITIPOBS_PSY_A_CORE
Constant observation
Routine observation
Routine observation
Constant observation
Routine observation
Constant observation
Routine observation
Constant observation
Routine observation
Constant observation
Routine observation
Constant observation
Routine observation
Constant observation
Routine observation
Constant observation
Routine observation
Constant observation
Constant observation
Routine observation

## 2019-03-21 NOTE — PROGRESS NOTE BEHAVIORAL HEALTH - LANGUAGE
No abnormalities noted

## 2019-03-21 NOTE — PROGRESS NOTE BEHAVIORAL HEALTH - NS ED BHA AXIS I SECONDARY3 CODE FT
F17.210
F11.462
F17.210
F25.9
F17.210
F22
F17.210

## 2019-03-21 NOTE — PROGRESS NOTE BEHAVIORAL HEALTH - NS ED BHA MSE GENERAL APPEARANCE
Well developed

## 2019-03-21 NOTE — PROGRESS NOTE BEHAVIORAL HEALTH - PROBLEM SELECTOR PROBLEM 2
Antisocial personality disorder in adult
Suicidal ideation
Antisocial personality disorder in adult
Suicidal ideation
Antisocial personality disorder in adult
Opioid dependence on agonist therapy
Antisocial personality disorder in adult
Opioid dependence on agonist therapy
Opioid dependence on agonist therapy
Antisocial personality disorder in adult
Opioid dependence on agonist therapy
Suicidal ideation
Antisocial personality disorder in adult

## 2019-03-21 NOTE — PROGRESS NOTE BEHAVIORAL HEALTH - NS ED BHA AXIS I SECONDARY1 CODE FT
F60.2
R45.918
F60.2
F11.20
F60.2
F60.2
F11.20
F41.9
F60.2
F11.20
F60.2

## 2019-03-21 NOTE — PROGRESS NOTE BEHAVIORAL HEALTH - NSBHLEGALSTATUS_PSY_A_CORE
9.13 (Voluntary)

## 2019-03-21 NOTE — PROGRESS NOTE BEHAVIORAL HEALTH - NSBHCHARTREVIEWVS_PSY_A_CORE FT
Vital Signs Last 24 Hrs  T(C): 36.8 (20 Mar 2019 17:06), Max: 36.8 (20 Mar 2019 17:06)  T(F): 98.3 (20 Mar 2019 17:06), Max: 98.3 (20 Mar 2019 17:06)  HR: 76 (20 Mar 2019 17:06) (76 - 77)  BP: 135/92 (20 Mar 2019 17:06) (135/92 - 147/96)  BP(mean): --  RR: 18 (20 Mar 2019 17:06) (18 - 20)  SpO2: --

## 2019-03-21 NOTE — PROGRESS NOTE BEHAVIORAL HEALTH - NS ED BHA MED ROS PSYCHIATRIC
See HPI

## 2019-03-21 NOTE — PROGRESS NOTE BEHAVIORAL HEALTH - NS ED BHA REVIEW OF ED CHART AVAILABLE IMAGING REVIEWED
Yes
None available
Yes
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
## 2019-03-21 NOTE — PROGRESS NOTE BEHAVIORAL HEALTH - HYGIENE
Good
Fair
Good
Fair
Good
Fair
Fair
Good
Fair

## 2019-03-21 NOTE — PROGRESS NOTE BEHAVIORAL HEALTH - NSBHCHARTREVIEWLAB_PSY_A_CORE FT
Urine Tox: THC (POS)  Lipid Profile (12.10.18 @ 05:42) ; Total Cholesterol/HDL Ratio Measurement: 1.9 RATIO ; Cholesterol, Serum: 182 mg/dL ; Triglycerides, Serum: 57 mg/dL ; HDL Cholesterol, Serum: 94:  12/22/18 HIV test : NONREACTIVE
Urine : POS for THC  Otherwise WNLL
Urine Tox: THC (POS)  Lipid Profile (12.10.18 @ 05:42) ; Total Cholesterol/HDL Ratio Measurement: 1.9 RATIO ; Cholesterol, Serum: 182 mg/dL ; Triglycerides, Serum: 57 mg/dL ; HDL Cholesterol, Serum: 94:  12/22/18 HIV test : NONREACTIVE
Urine Tox: THC (POS)
Urine Tox: THC (POS)  Lipid Profile (12.10.18 @ 05:42) ; Total Cholesterol/HDL Ratio Measurement: 1.9 RATIO ; Cholesterol, Serum: 182 mg/dL ; Triglycerides, Serum: 57 mg/dL ; HDL Cholesterol, Serum: 94:  12/22/18 HIV test : NONREACTIVE
Urine Tox: THC (POS)  Lipid Profile (12.10.18 @ 05:42) ; Total Cholesterol/HDL Ratio Measurement: 1.9 RATIO ; Cholesterol, Serum: 182 mg/dL ; Triglycerides, Serum: 57 mg/dL ; HDL Cholesterol, Serum: 94:  12/22/18 HIV test : NONREACTIVE
Urine Tox: THC (POS)  Lipid Profile (12.10.18 @ 05:42) ; Total Cholesterol/HDL Ratio Measurement: 1.9 RATIO ; Cholesterol, Serum: 182 mg/dL ; Triglycerides, Serum: 57 mg/dL ; HDL Cholesterol, Serum: 94:
Urine Tox: THC (POS)  Lipid Profile (12.10.18 @ 05:42) ; Total Cholesterol/HDL Ratio Measurement: 1.9 RATIO ; Cholesterol, Serum: 182 mg/dL ; Triglycerides, Serum: 57 mg/dL ; HDL Cholesterol, Serum: 94:
Urine Tox: THC (POS)  Lipid Profile (12.10.18 @ 05:42) ; Total Cholesterol/HDL Ratio Measurement: 1.9 RATIO ; Cholesterol, Serum: 182 mg/dL ; Triglycerides, Serum: 57 mg/dL ; HDL Cholesterol, Serum: 94:  12/22/18 HIV test : NONREACTIVE
Urine Tox: THC (POS)
Urine Tox: THC (POS)
Urine Tox: THC (POS)  Lipid Profile (12.10.18 @ 05:42) ; Total Cholesterol/HDL Ratio Measurement: 1.9 RATIO ; Cholesterol, Serum: 182 mg/dL ; Triglycerides, Serum: 57 mg/dL ; HDL Cholesterol, Serum: 94:
Urine Tox: THC (POS)  Lipid Profile (12.10.18 @ 05:42) ; Total Cholesterol/HDL Ratio Measurement: 1.9 RATIO ; Cholesterol, Serum: 182 mg/dL ; Triglycerides, Serum: 57 mg/dL ; HDL Cholesterol, Serum: 94:  12/22/18 HIV test : NONREACTIVE
Urine Tox: THC (POS)  Lipid Profile (12.10.18 @ 05:42) ; Total Cholesterol/HDL Ratio Measurement: 1.9 RATIO ; Cholesterol, Serum: 182 mg/dL ; Triglycerides, Serum: 57 mg/dL ; HDL Cholesterol, Serum: 94:  12/22/18 HIV test : NONREACTIVE
Urine Tox: THC (POS)  Lipid Profile (12.10.18 @ 05:42) ; Total Cholesterol/HDL Ratio Measurement: 1.9 RATIO ; Cholesterol, Serum: 182 mg/dL ; Triglycerides, Serum: 57 mg/dL ; HDL Cholesterol, Serum: 94:
Urine Tox: THC (POS)  Lipid Profile (12.10.18 @ 05:42) ; Total Cholesterol/HDL Ratio Measurement: 1.9 RATIO ; Cholesterol, Serum: 182 mg/dL ; Triglycerides, Serum: 57 mg/dL ; HDL Cholesterol, Serum: 94:  12/22/18 HIV test : NONREACTIVE
Urine Tox: THC (POS)  Lipid Profile (12.10.18 @ 05:42) ; Total Cholesterol/HDL Ratio Measurement: 1.9 RATIO ; Cholesterol, Serum: 182 mg/dL ; Triglycerides, Serum: 57 mg/dL ; HDL Cholesterol, Serum: 94:
Urine Tox: THC (POS)  Lipid Profile (12.10.18 @ 05:42) ; Total Cholesterol/HDL Ratio Measurement: 1.9 RATIO ; Cholesterol, Serum: 182 mg/dL ; Triglycerides, Serum: 57 mg/dL ; HDL Cholesterol, Serum: 94:  12/22/18 HIV test : NONREACTIVE

## 2019-03-21 NOTE — PROGRESS NOTE BEHAVIORAL HEALTH - PROBLEM SELECTOR PLAN 4
Nicotine Gum 2mg PRN Q2 hours
Methadone 10mg ; contact possible Suboxone provider; consider restarting Suboxone if verified.
Nicotine Gum 2mg PRN Q2 hours
Continue Seroquel 300mg HS
Nicotine gum 2mg
Atarax 50mg PRN   Benadryl 50mg PRN
Continue Seroquel 300mg HS
Nicotine Gum 2mg PRN Q2 hours
Olanzapine 5mg BID  Quetiapine 100 HS  Klonopin 1mg PRN Q6 hours  Diphenhydramine 50mg PRN Q6 hours  Atarax 50mg PRN Q6 hours
Methadone 10mg ; contact possible Suboxone provider; consider restarting Suboxone if verified.
Reduce Seroquel to 100mg at night cross tapering over to Olanzepine (Zyprexa) 5mg po bid for worsening paranoid and disorganized thinking;  see interval and summary data for updates.

## 2019-03-21 NOTE — PROGRESS NOTE BEHAVIORAL HEALTH - PROBLEM SELECTOR PROBLEM 1
Paranoid disorder
Schizophrenia, paranoid, chronic with acute exacerbation
Paranoid disorder
Schizoaffective disorder, unspecified type
Paranoid disorder
Schizoaffective disorder, unspecified type
Paranoid disorder

## 2019-03-21 NOTE — PROGRESS NOTE BEHAVIORAL HEALTH - BODY HABITUS
Well nourished

## 2019-03-21 NOTE — PROGRESS NOTE BEHAVIORAL HEALTH - NSBHCONSORIP_PSY_A_CORE
Inpatient Admission...

## 2019-03-21 NOTE — PROGRESS NOTE BEHAVIORAL HEALTH - NSBHFUPTYPE_PSY_A_CORE
Inpatient

## 2019-03-21 NOTE — PROGRESS NOTE BEHAVIORAL HEALTH - NSBHFUPINTERVALHXFT_PSY_A_CORE
attends groups; no mention of anyone harming him; no mention of s/h i/i/[p or avh; shakes writer's hands and smiles; attending groups and socializing appropriately;  cog intact; normal gait; no tremor; ij: fair to poor.

## 2019-03-21 NOTE — PROGRESS NOTE BEHAVIORAL HEALTH - NS ED BHA MED ROS GASTROINTESTINAL
No complaints

## 2019-03-21 NOTE — PROGRESS NOTE BEHAVIORAL HEALTH - PROBLEM SELECTOR PROBLEM 3
Opioid dependence on agonist therapy
Other depression
Opioid dependence on agonist therapy
Cigarette nicotine dependence without complication
Opioid dependence on agonist therapy
Cigarette nicotine dependence without complication
Opioid dependence on agonist therapy
Schizoaffective disorder, unspecified type
Opioid dependence on agonist therapy
Cigarette nicotine dependence without complication
Other depression
Cigarette nicotine dependence without complication

## 2019-03-21 NOTE — PROGRESS NOTE BEHAVIORAL HEALTH - NS ED BHA AXIS I PRIMARY CODE FT
F22
F20.0
F22
F25.9
F22
F25.9
F22

## 2019-03-21 NOTE — PROGRESS NOTE BEHAVIORAL HEALTH - SECONDARY DX2
Opioid dependence on agonist therapy
Other depression
Opioid dependence on agonist therapy
Schizoaffective disorder, unspecified type
Opioid dependence on agonist therapy
Schizoaffective disorder, unspecified type
Opioid dependence on agonist therapy
Anxiety disorder, unspecified type
Opioid dependence on agonist therapy
Schizoaffective disorder, unspecified type
Opioid dependence on agonist therapy
Opioid dependence on agonist therapy

## 2019-03-21 NOTE — PROGRESS NOTE BEHAVIORAL HEALTH - AXIS III
none

## 2019-03-21 NOTE — PROGRESS NOTE BEHAVIORAL HEALTH - MUSCLE TONE / STRENGTH
Normal muscle tone/strength

## 2019-03-21 NOTE — PROGRESS NOTE BEHAVIORAL HEALTH - NS ED BHA REVIEW OF ED CHART AVAILABLE INVESTIGATIONS REVIEWED
None available

## 2019-03-21 NOTE — PROGRESS NOTE BEHAVIORAL HEALTH - SECONDARY DX3
Cigarette nicotine dependence without complication
Opioid dependence with intoxication with complication
Cigarette nicotine dependence without complication
Schizoaffective disorder, unspecified type
Cigarette nicotine dependence without complication
Marioa
Cigarette nicotine dependence without complication

## 2019-03-21 NOTE — PROGRESS NOTE BEHAVIORAL HEALTH - NSBHADMITIPREASON_PSY_A_CORE
Danger to self; mental illness expected to respond to inpatient care

## 2019-03-21 NOTE — PROGRESS NOTE BEHAVIORAL HEALTH - NS ED BHA MSE SPEECH ARTICULATION
Normal

## 2019-03-21 NOTE — PROGRESS NOTE BEHAVIORAL HEALTH - PERCEPTIONS
Good morning. Up in chair for breakfast. Then back to bed at 1030am and slept hard until 1430. Family concerned about how hard she was sleeping as this is un like her. She would wake and talk to them but fall right back to sleep. VS check and stable. Cheeks red but afebrile. Redness resolved. Woke at 1430 and returned to baseline. Tele A-flutter/ Vpaced. HR low 50's and atenolol held this am. Dr. Bartholomew advised of HR and med hold.!  1+ lower ext edema. 450cc urine output from díaz. New order for Cards consult.   No abnormalities

## 2019-03-21 NOTE — PROGRESS NOTE BEHAVIORAL HEALTH - NS ED BHA AXIS I SECONDARY2 CODE FT
F11.20
F32.89
F11.20
F25.9
F11.20
F25.9
F11.20
F41.9
F11.20
F25.9
F11.20
F11.20

## 2019-03-21 NOTE — PROGRESS NOTE BEHAVIORAL HEALTH - NSBHADMITIPOBSFT_PSY_A_CORE
active suicidality
can make needs known
can make needs known
active suicidality
can make needs known
active suicidality
can make needs known
active suicidality
can make needs known
active suicidality
can make needs known
makes needs known ;  CO discontinued after Methadone given.
active suicidality
can make needs known
active suicidality
makes needs known ;  CO discontinued after Methadone given.
active suicidality
makes needs known ;
active suicidality
active suicidality
makes needs known ;  CO discontinued after Methadone given.
makes needs known ;  CO discontinued after Methadone given.
makes needs known ;
makes needs known ;
makes needs known ;  CO discontinued after Methadone given.
makes needs known ;

## 2019-03-21 NOTE — PROGRESS NOTE BEHAVIORAL HEALTH - SECONDARY DX1
Antisocial personality disorder in adult
Suicidal ideation
Antisocial personality disorder in adult
Opioid dependence on agonist therapy
Antisocial personality disorder in adult
Anxiety disorder, unspecified type
Opioid dependence on agonist therapy
Antisocial personality disorder in adult
Opioid dependence on agonist therapy
Antisocial personality disorder in adult

## 2019-03-21 NOTE — PROGRESS NOTE BEHAVIORAL HEALTH - NSBHLOC_PSY_A_CORE
Alert

## 2019-03-21 NOTE — PROGRESS NOTE BEHAVIORAL HEALTH - NSBHFUPINTERVALCCFT_PSY_A_CORE
patient is aware that he is not going to [facility] and is going to [facility] today after d/c; no sleep appetite or pain issues

## 2019-03-21 NOTE — PROGRESS NOTE BEHAVIORAL HEALTH - NSBHADMITMEDEDU_PSY_A_CORE
no

## 2019-03-25 DIAGNOSIS — F11.20 OPIOID DEPENDENCE, UNCOMPLICATED: ICD-10-CM

## 2019-03-25 DIAGNOSIS — R45.851 SUICIDAL IDEATIONS: ICD-10-CM

## 2019-03-25 DIAGNOSIS — F22 DELUSIONAL DISORDERS: ICD-10-CM

## 2019-03-25 DIAGNOSIS — F41.8 OTHER SPECIFIED ANXIETY DISORDERS: ICD-10-CM

## 2019-03-25 DIAGNOSIS — F13.19 SEDATIVE, HYPNOTIC OR ANXIOLYTIC ABUSE WITH UNSPECIFIED SEDATIVE, HYPNOTIC OR ANXIOLYTIC-INDUCED DISORDER: ICD-10-CM

## 2019-03-25 DIAGNOSIS — F17.210 NICOTINE DEPENDENCE, CIGARETTES, UNCOMPLICATED: ICD-10-CM

## 2019-03-25 DIAGNOSIS — F12.10 CANNABIS ABUSE, UNCOMPLICATED: ICD-10-CM

## 2019-03-25 DIAGNOSIS — F60.2 ANTISOCIAL PERSONALITY DISORDER: ICD-10-CM

## 2019-05-29 NOTE — PROGRESS NOTE BEHAVIORAL HEALTH - ABNORMAL MOVEMENTS
Suicidal Ideation:  denies   Suicide Plan: denies  Is Patient Feeling Safe?:  yes  Safety Plan Reviewed?: yes, safety plan is to speak to staff prior to harming himself or others  Hopelessness:  denies    Depression: \"yes\"  Anxiety: \"yes\"    Isolating Behavior:  yes  Homicidal Ideation:  denies   Mood (1 = worst, 10 = best):  1 of 10   Affect:  Anxious, depressed and slightly irritable    AVH/delusions/paranoia: denies    Pain: C/O 8/10 headache, prn ibuprofen given    Attended Groups:  Yes   Compliant with Medications:  yes  Observed Behavior:  Sleeping on and off throughout the morning    Progress Note:   States he \"tossed and turned\" last night. CIWA 9 and 7, prn 2 mg ativan given for CIWA of 8. Able to make his needs known, will continue to monitor.      05/29/19 0968    MENTAL STATUS    Mental Status Assessment WDL Except   -Mental Assessment Frequency BID   Level of Consciousness 3   Insight Poor   Judgment Poor   Perceptual Disorders/Hallucinations   (denies)   Sleep/Wake Cycle Hypersomnolence   Affect/Behavior Anxious;Depressed;Sad;Irritable;Withdrawn   Mood Anxious   Appearance/Dress Disheveled appearance        No abnormal movements

## 2019-10-21 VITALS
TEMPERATURE: 98 F | OXYGEN SATURATION: 99 % | DIASTOLIC BLOOD PRESSURE: 88 MMHG | RESPIRATION RATE: 18 BRPM | HEART RATE: 58 BPM | SYSTOLIC BLOOD PRESSURE: 139 MMHG

## 2019-10-21 LAB
AMPHET UR-MCNC: POSITIVE
ANION GAP SERPL CALC-SCNC: 8 MMOL/L — SIGNIFICANT CHANGE UP (ref 5–17)
APAP SERPL-MCNC: <5 UG/ML — LOW (ref 10–30)
APPEARANCE UR: CLEAR — SIGNIFICANT CHANGE UP
BARBITURATES UR SCN-MCNC: NEGATIVE — SIGNIFICANT CHANGE UP
BASOPHILS # BLD AUTO: 0.08 K/UL — SIGNIFICANT CHANGE UP (ref 0–0.2)
BASOPHILS NFR BLD AUTO: 1.3 % — SIGNIFICANT CHANGE UP (ref 0–2)
BENZODIAZ UR-MCNC: POSITIVE
BILIRUB UR-MCNC: NEGATIVE — SIGNIFICANT CHANGE UP
BUN SERPL-MCNC: 15 MG/DL — SIGNIFICANT CHANGE UP (ref 7–23)
CALCIUM SERPL-MCNC: 9.8 MG/DL — SIGNIFICANT CHANGE UP (ref 8.4–10.5)
CHLORIDE SERPL-SCNC: 102 MMOL/L — SIGNIFICANT CHANGE UP (ref 96–108)
CO2 SERPL-SCNC: 30 MMOL/L — SIGNIFICANT CHANGE UP (ref 22–31)
COCAINE METAB.OTHER UR-MCNC: NEGATIVE — SIGNIFICANT CHANGE UP
COLOR SPEC: YELLOW — SIGNIFICANT CHANGE UP
CREAT SERPL-MCNC: 0.93 MG/DL — SIGNIFICANT CHANGE UP (ref 0.5–1.3)
DIFF PNL FLD: NEGATIVE — SIGNIFICANT CHANGE UP
EOSINOPHIL # BLD AUTO: 0.15 K/UL — SIGNIFICANT CHANGE UP (ref 0–0.5)
EOSINOPHIL NFR BLD AUTO: 2.4 % — SIGNIFICANT CHANGE UP (ref 0–6)
ETHANOL SERPL-MCNC: <10 MG/DL — SIGNIFICANT CHANGE UP (ref 0–10)
GLUCOSE SERPL-MCNC: 101 MG/DL — HIGH (ref 70–99)
GLUCOSE UR QL: NEGATIVE — SIGNIFICANT CHANGE UP
HCT VFR BLD CALC: 43.4 % — SIGNIFICANT CHANGE UP (ref 39–50)
HGB BLD-MCNC: 14.2 G/DL — SIGNIFICANT CHANGE UP (ref 13–17)
IMM GRANULOCYTES NFR BLD AUTO: 0.2 % — SIGNIFICANT CHANGE UP (ref 0–1.5)
KETONES UR-MCNC: NEGATIVE — SIGNIFICANT CHANGE UP
LEUKOCYTE ESTERASE UR-ACNC: NEGATIVE — SIGNIFICANT CHANGE UP
LYMPHOCYTES # BLD AUTO: 2.46 K/UL — SIGNIFICANT CHANGE UP (ref 1–3.3)
LYMPHOCYTES # BLD AUTO: 38.6 % — SIGNIFICANT CHANGE UP (ref 13–44)
MCHC RBC-ENTMCNC: 27.1 PG — SIGNIFICANT CHANGE UP (ref 27–34)
MCHC RBC-ENTMCNC: 32.7 GM/DL — SIGNIFICANT CHANGE UP (ref 32–36)
MCV RBC AUTO: 82.8 FL — SIGNIFICANT CHANGE UP (ref 80–100)
METHADONE UR-MCNC: NEGATIVE — SIGNIFICANT CHANGE UP
MONOCYTES # BLD AUTO: 0.35 K/UL — SIGNIFICANT CHANGE UP (ref 0–0.9)
MONOCYTES NFR BLD AUTO: 5.5 % — SIGNIFICANT CHANGE UP (ref 2–14)
NEUTROPHILS # BLD AUTO: 3.32 K/UL — SIGNIFICANT CHANGE UP (ref 1.8–7.4)
NEUTROPHILS NFR BLD AUTO: 52 % — SIGNIFICANT CHANGE UP (ref 43–77)
NITRITE UR-MCNC: NEGATIVE — SIGNIFICANT CHANGE UP
NRBC # BLD: 0 /100 WBCS — SIGNIFICANT CHANGE UP (ref 0–0)
OPIATES UR-MCNC: NEGATIVE — SIGNIFICANT CHANGE UP
PCP SPEC-MCNC: SIGNIFICANT CHANGE UP
PCP UR-MCNC: NEGATIVE — SIGNIFICANT CHANGE UP
PH UR: 7 — SIGNIFICANT CHANGE UP (ref 5–8)
PLATELET # BLD AUTO: 284 K/UL — SIGNIFICANT CHANGE UP (ref 150–400)
POTASSIUM SERPL-MCNC: 4.8 MMOL/L — SIGNIFICANT CHANGE UP (ref 3.5–5.3)
POTASSIUM SERPL-SCNC: 4.8 MMOL/L — SIGNIFICANT CHANGE UP (ref 3.5–5.3)
PROT UR-MCNC: NEGATIVE MG/DL — SIGNIFICANT CHANGE UP
RBC # BLD: 5.24 M/UL — SIGNIFICANT CHANGE UP (ref 4.2–5.8)
RBC # FLD: 14.4 % — SIGNIFICANT CHANGE UP (ref 10.3–14.5)
SALICYLATES SERPL-MCNC: <0.3 MG/DL — LOW (ref 2.8–20)
SODIUM SERPL-SCNC: 140 MMOL/L — SIGNIFICANT CHANGE UP (ref 135–145)
SP GR SPEC: 1.02 — SIGNIFICANT CHANGE UP (ref 1–1.03)
THC UR QL: POSITIVE
UROBILINOGEN FLD QL: 0.2 E.U./DL — SIGNIFICANT CHANGE UP
WBC # BLD: 6.37 K/UL — SIGNIFICANT CHANGE UP (ref 3.8–10.5)
WBC # FLD AUTO: 6.37 K/UL — SIGNIFICANT CHANGE UP (ref 3.8–10.5)

## 2019-10-21 PROCEDURE — 90792 PSYCH DIAG EVAL W/MED SRVCS: CPT

## 2019-10-21 RX ORDER — CLONAZEPAM 1 MG
2 TABLET ORAL ONCE
Refills: 0 | Status: DISCONTINUED | OUTPATIENT
Start: 2019-10-21 | End: 2019-10-21

## 2019-10-21 RX ORDER — BUPRENORPHINE AND NALOXONE 2; .5 MG/1; MG/1
1 TABLET SUBLINGUAL ONCE
Refills: 0 | Status: DISCONTINUED | OUTPATIENT
Start: 2019-10-21 | End: 2019-10-21

## 2019-10-21 RX ORDER — QUETIAPINE FUMARATE 200 MG/1
100 TABLET, FILM COATED ORAL ONCE
Refills: 0 | Status: COMPLETED | OUTPATIENT
Start: 2019-10-21 | End: 2019-10-21

## 2019-10-21 RX ORDER — CLONAZEPAM 1 MG
2 TABLET ORAL ONCE
Refills: 0 | Status: DISCONTINUED | OUTPATIENT
Start: 2019-10-22 | End: 2019-10-22

## 2019-10-21 RX ORDER — MIRTAZAPINE 45 MG/1
15 TABLET, ORALLY DISINTEGRATING ORAL ONCE
Refills: 0 | Status: COMPLETED | OUTPATIENT
Start: 2019-10-21 | End: 2019-10-21

## 2019-10-21 RX ORDER — BUPRENORPHINE AND NALOXONE 2; .5 MG/1; MG/1
1 TABLET SUBLINGUAL ONCE
Refills: 0 | Status: DISCONTINUED | OUTPATIENT
Start: 2019-10-22 | End: 2019-10-22

## 2019-10-21 RX ADMIN — Medication 2 MILLIGRAM(S): at 22:54

## 2019-10-21 RX ADMIN — QUETIAPINE FUMARATE 100 MILLIGRAM(S): 200 TABLET, FILM COATED ORAL at 22:53

## 2019-10-21 RX ADMIN — MIRTAZAPINE 15 MILLIGRAM(S): 45 TABLET, ORALLY DISINTEGRATING ORAL at 22:53

## 2019-10-21 RX ADMIN — Medication 15 MILLIGRAM(S): at 22:54

## 2019-10-21 NOTE — ED BEHAVIORAL HEALTH ASSESSMENT NOTE - SUICIDE RISK FACTORS
Impulsivity/Recent onset of current/past psychiatric diagnosis/Psychotic disorder current/past/Unable to engage in safety planning/Agitation/Severe Anxiety/Panic Hopelessness or despair/Impulsivity/Psychotic disorder current/past/Mood Disorder current/past/Recent onset of current/past psychiatric diagnosis/Unable to engage in safety planning/Agitation/Severe Anxiety/Panic/Access to lethal methods (pills, firearm, etc.: Ask specifically about presence or absence of a firearm in the home or ease of accessing

## 2019-10-21 NOTE — ED BEHAVIORAL HEALTH ASSESSMENT NOTE - NS ED BHA ALCOHOL
[facility]  [address] [facility] [address]  Phone: [phone]    Name:  Sonja Lowe  Current Date: 2017  : 1994  MRN: [id number]   KAREN: [id number]    Visit Date: 2017  Address: [id number] [place] [address]  Phone: [phone]    Primary Care Provider     Name: Airam Goldman MD    Phone: [phone]    The staff of [facility] would like to thank you for allowing us to assist you with your healthcare needs. The following includes patient education materials and information on how best to care for your illness/injury at home and when to see a physician. If you need to locate a Doctor or clinic close to you, please call the Doctor Referral Service at [phone]. The Service is available Monday through Thursday from 8 AM to 8 PM and  from 8 AM to 4 PM.    Patients Please Note: If further time off is required, or a medical clearance to return to work is required, it must be obtained through your primary physician.  Return to work clearances and extensions of \"Time-Off\" will not be given by the Emergency Department.     We hope that you leave our Emergency Department believing that we provided you with very good care.   Your Opinion Matters To Us  If you receive a patient satisfaction survey in the mail, please complete and return it in the postage-paid envelope.  We truly value and appreciate your feedback.  Emergency Department Care Providers   Physician:Cash Paiz DO    Advanced Practice Provider:  No providers found     RN_________________ ED Tech__________________ Clerical_________________         None known

## 2019-10-21 NOTE — ED BEHAVIORAL HEALTH ASSESSMENT NOTE - SUMMARY
50yo  Male, currently domiciled in [address], under the care of Pamela Snider psychiatric NP, known to [facility] from most recent admission Dec '18- Mar '19,  reporting paranoid thoughts so severe in nature they are causing him such significant distress he currently feels the only way to end his frustration is to jump off a bridge.  Given pt's past hx of suicide attempts and psychiatric admissions, combined with his current elevated acuity, at this time patient represents a high risk of danger to himself, and requires admission to a psychiatric unit for symptom stabilization. 48yo  Male, currently domiciled in [address], under the care of Pamela Snider psychiatric NP, known to [facility] from most recent admission Dec '18- Mar '19,  reporting paranoid thoughts so severe in nature they are causing him such significant distress he currently feels the only way to end his frustration is to jump off a bridge.  Given pt's past hx of suicide attempts and psychiatric admissions, combined with his current acute paranoid ideation, at this time patient represents a high risk of danger to himself, and requires admission to a psychiatric unit for symptom stabilization.

## 2019-10-21 NOTE — ED PROVIDER NOTE - OBJECTIVE STATEMENT
here feeling anxious, paranoid, suicidal.  Says this has been going on a long time but getting worse.  Feels like people are following him, talking about him.  Taking his medicine as prescribed and going to outpatient but not feeling better.  Says he cant take it anymore and thinking of jumping in front of train or off bridge.  Denies alcohol / drug use.  No fever, no trauma.  Currently living in a shelter

## 2019-10-21 NOTE — ED ADULT TRIAGE NOTE - CHIEF COMPLAINT QUOTE
pt refused to given details in triage,  asking to see and speak to the doctor , wrote on registration note " I fear for my life  and im ready to die if I have to like a martyr" pt denies visual or auditory hallucinations. states " why is the police officers and   following me, they are talking about me"

## 2019-10-21 NOTE — ED PROVIDER NOTE - CLINICAL SUMMARY MEDICAL DECISION MAKING FREE TEXT BOX
paranoid with anxiety/ suicidal ideation.  admission here for similar for 3 months at the beginning of this year.  psychiatry consulted. paranoid with anxiety/ suicidal ideation.  admission here for similar for 3 months at the beginning of this year.  psychiatry consulted.  denies other medical issues.  medical clearance obtained.  seen by psych who felt admission warranted but no bed available at this time.  maintenance medications ordered per psych recommendations.  signed out to Dr. Garg/ ARABELLA Vigil pending placement

## 2019-10-21 NOTE — ED BEHAVIORAL HEALTH ASSESSMENT NOTE - RISK ASSESSMENT
Static: Patient has several risk factors which are nonmodifiable by inpatient psychiatric admission such as male gender, single relationship status, unemployed, dependence social welfare for support, hx of chronic substance use, and likely underlying anxiety or personality disorders.   Modifiable: Psychopharmacological comanagement  Protective: Future-oriented, recent sobriety, engaged in treatment  Current Risk: High High Acute Suicide Risk Static: Patient has several risk factors which are non-modifiable by inpatient psychiatric admission such as male gender, single relationship status, unemployed, dependence social welfare for support, hx of chronic substance use, and likely underlying anxiety or personality disorders.   Modifiable: Psychopharmacological comanagement  Protective: Future-oriented, recent sobriety, engaged in treatment  Current Risk: High

## 2019-10-21 NOTE — ED BEHAVIORAL HEALTH ASSESSMENT NOTE - DESCRIPTION
Pt. medically evaluated in the ED. reported resolved hepatitis C see HPI Pt is calm but appears guarded and paranoid on interview

## 2019-10-21 NOTE — ED BEHAVIORAL HEALTH ASSESSMENT NOTE - VIOLENCE RISK FACTORS:
Antisocial behavior/cognition (past or present)/Impulsivity/History of violation of a legal mandate (e.g., parole, probation, AOT)/Feeling of being under threat and being unable to control threat

## 2019-10-21 NOTE — ED ADULT NURSE NOTE - OBJECTIVE STATEMENT
48 y/o male c/o SI. pt refuses to speak of issues w/ RN. Pt makes poor eye contact. Pt attire was doff'ed upon assessment however personal hygiene unremarkable. pt appears clean and well kept. pt speaks clear, MAEx4, ambulates steady, unlabored breathing. Abd soft ntnd. SKin dry warm.

## 2019-10-21 NOTE — ED BEHAVIORAL HEALTH ASSESSMENT NOTE - CURRENT MEDICATION
As per pt report: Remeron 15mg qHS; Seroquel 100mg qHS; Adderall 20mg qdaily (ISTOP verified); Klonopin 2mg TID (ISTOP verified); Buspar 15mg BID; Clonidine 0.5mg qdaily prn

## 2019-10-21 NOTE — ED ADULT NURSE REASSESSMENT NOTE - NS ED NURSE REASSESS COMMENT FT1
pt already in gown. pt belongings collected and stored for safe keeping. pt has 1:1 at bedside. pt cooperating w/ cares.

## 2019-10-21 NOTE — ED BEHAVIORAL HEALTH ASSESSMENT NOTE - DETAILS
as above.  Most recent SA in 2013.  One additional attempt in 1998. upon bed availability d/w Dr. Trinh discussed with 8 uris staff d/w PA Lee

## 2019-10-21 NOTE — ED BEHAVIORAL HEALTH ASSESSMENT NOTE - HPI (INCLUDE ILLNESS QUALITY, SEVERITY, DURATION, TIMING, CONTEXT, MODIFYING FACTORS, ASSOCIATED SIGNS AND SYMPTOMS)
50yo  Male, currently domiciled in [address], under the care of Pamela Snider psychiatric NP, known to [facility] from most recent admission Dec '18- Mar '19,  reporting paranoid thoughts so severe in nature they are causing him such significant distress he currently feels the only way to end his frustration is to jump off a bridge.  During evaluation by this MD, pt constantly pauses in his replies to eyeball those around him, and makes mention of numerous paranoid thoughts, including, "I've been being followed," "every time I go to the bathroom I'm being recorded," "my conversations ae being recorded," "I spoke to Internal Affairs and they're in on it," as well as at one point accusing writer of being an officer, "you're a  aren't you."  Pt. at times became loud when frustrated, tearful when reporting his distress, and would constantly pause to look about the room, in particular when anyone around him was on their cell phone.  Pt. denies current active SI, but alludes to suicide as being the only possible way to end his torment. Pt. also reported frustration because "I'm doing everything right!"  He reports that he has been compliant with medications, working out regularly, keeping up with his PO appointments, seeking to obtain his GED, and not abusing his medications or drugs.   Writer had difficulty obtaining further details of pt's history as probing questions would quickly trigger pt's paranoia and eventually lead to pt accusing writer of being an officer; evaluation was terminated so as to preserve rapport and help pt to maintain his composure.  Pt. denied HI/AH/VH.      Writer spoke with pt's sister, who was greatly relieved pt was in the Emergency Room. She reported the pt has been texting her all day pictures of people he suspects of following him, and she reports "he's having an episode," "meds are not working," "he might get hurt," "I'm afraid," and finally "I'm so glad he's there."  Her concern for her brother's well-being was clear, and she reported she will sleep much better tonight knowing that he will be in the hospital being cared for.     Pt. reports he was released from [facility] Aug 7 2019 after a Failure to Report violation.  While incarcerated, he reports he was in the [facility].   Prior to his incarceration, his most recent psych admission was at [facility].      Additional Collateral calls were placed to the following:  Message left for patient’s VAUGHN [phone] requesting call back 48yo  Male, currently domiciled in [address], under the care of Pamela Snider psychiatric NP, known to [facility] from most recent admission Dec '18- Mar '19,  reporting paranoid thoughts so severe in nature they are causing him such significant distress he currently feels the only way to end his frustration is to jump off a bridge.  During evaluation by this MD, pt constantly pauses in his replies to eyeball those around him, and makes mention of numerous paranoid thoughts, including, "I've been being followed," "every time I go to the bathroom I'm being recorded," "my conversations ae being recorded," "I spoke to Internal Affairs and they're in on it," as well as at one point accusing writer of being an officer, "you're a  aren't you."  Pt. at times became loud when frustrated, tearful when reporting his distress, and would constantly pause to look about the room, in particular when anyone around him was on their cell phone.  Pt. denies current active SI, but alludes to suicide as being the only possible way to end his torment. Pt. also reported frustration because "I'm doing everything right!"  He reports that he has been compliant with medications, working out regularly, keeping up with his PO appointments, seeking to obtain his GED, and not abusing his medications or drugs.   Writer had difficulty obtaining further details of pt's history as probing questions would quickly trigger pt's paranoia and eventually lead to pt accusing writer of being an officer; evaluation was terminated so as to preserve rapport and help pt to maintain his composure.  Pt. denied HI/AH/VH.      Ashwini psychiatrist  spoke with pt's sister last night, who "was greatly relieved pt was in the Emergency Room. She reported the pt has been texting her all day pictures of people he suspects of following him, and she reports "he's having an episode," "meds are not working," "he might get hurt," "I'm afraid," and finally "I'm so glad he's there."  Her concern for her brother's well-being was clear, and she reported she will sleep much better tonight knowing that he will be in the hospital being cared for".    Pt. reported he was released from [facility] Aug 7 2019 after a Failure to Report violation.  While incarcerated, he reports he was in the [facility].   Prior to his incarceration, his most recent psych admission was at [facility].      Additional Collateral calls were placed to the following:  Message left for patient’s VAUGHN [phone] requesting call back    This morning pt continues to endorse PI, stating people are following him everywhere. He believes that people followed him into the hospital as well. Pt contracts for safety in the hospital. He however states that he can not take the stress and would "probably jump off the bridge" if he were to be discharged.     TOday

## 2019-10-21 NOTE — ED BEHAVIORAL HEALTH ASSESSMENT NOTE - OTHER
upon bed availability muscular alternates between soft and loud sister ISTOP Reference #[id number] has a

## 2019-10-22 ENCOUNTER — INPATIENT (INPATIENT)
Facility: HOSPITAL | Age: 49
LOS: 13 days | Discharge: HOME | DRG: 897 | End: 2019-11-05
Attending: PSYCHIATRY & NEUROLOGY | Admitting: PSYCHIATRY & NEUROLOGY
Payer: MEDICAID

## 2019-10-22 DIAGNOSIS — F22 DELUSIONAL DISORDERS: ICD-10-CM

## 2019-10-22 DIAGNOSIS — F60.0 PARANOID PERSONALITY DISORDER: ICD-10-CM

## 2019-10-22 DIAGNOSIS — F60.2 ANTISOCIAL PERSONALITY DISORDER: ICD-10-CM

## 2019-10-22 DIAGNOSIS — R69 ILLNESS, UNSPECIFIED: ICD-10-CM

## 2019-10-22 PROBLEM — F25.9 SCHIZOAFFECTIVE DISORDER, UNSPECIFIED: Chronic | Status: ACTIVE | Noted: 2018-12-09

## 2019-10-22 PROBLEM — F90.9 ATTENTION-DEFICIT HYPERACTIVITY DISORDER, UNSPECIFIED TYPE: Chronic | Status: ACTIVE | Noted: 2018-12-09

## 2019-10-22 PROBLEM — F43.10 POST-TRAUMATIC STRESS DISORDER, UNSPECIFIED: Chronic | Status: ACTIVE | Noted: 2018-12-09

## 2019-10-22 PROCEDURE — 93010 ELECTROCARDIOGRAM REPORT: CPT

## 2019-10-22 PROCEDURE — 99285 EMERGENCY DEPT VISIT HI MDM: CPT

## 2019-10-22 PROCEDURE — 90792 PSYCH DIAG EVAL W/MED SRVCS: CPT

## 2019-10-22 RX ORDER — ACETAMINOPHEN 500 MG
650 TABLET ORAL EVERY 6 HOURS
Refills: 0 | Status: DISCONTINUED | OUTPATIENT
Start: 2019-10-22 | End: 2019-11-05

## 2019-10-22 RX ORDER — HALOPERIDOL DECANOATE 100 MG/ML
5 INJECTION INTRAMUSCULAR EVERY 6 HOURS
Refills: 0 | Status: DISCONTINUED | OUTPATIENT
Start: 2019-10-22 | End: 2019-10-25

## 2019-10-22 RX ORDER — MIRTAZAPINE 45 MG/1
15 TABLET, ORALLY DISINTEGRATING ORAL AT BEDTIME
Refills: 0 | Status: DISCONTINUED | OUTPATIENT
Start: 2019-10-22 | End: 2019-10-23

## 2019-10-22 RX ORDER — BUPRENORPHINE AND NALOXONE 2; .5 MG/1; MG/1
1 TABLET SUBLINGUAL
Refills: 0 | Status: DISCONTINUED | OUTPATIENT
Start: 2019-10-22 | End: 2019-10-28

## 2019-10-22 RX ORDER — BUPRENORPHINE AND NALOXONE 2; .5 MG/1; MG/1
1 TABLET SUBLINGUAL ONCE
Refills: 0 | Status: DISCONTINUED | OUTPATIENT
Start: 2019-10-22 | End: 2019-10-22

## 2019-10-22 RX ORDER — MAGNESIUM HYDROXIDE 400 MG/1
30 TABLET, CHEWABLE ORAL DAILY
Refills: 0 | Status: DISCONTINUED | OUTPATIENT
Start: 2019-10-22 | End: 2019-11-05

## 2019-10-22 RX ORDER — QUETIAPINE FUMARATE 200 MG/1
50 TABLET, FILM COATED ORAL ONCE
Refills: 0 | Status: COMPLETED | OUTPATIENT
Start: 2019-10-22 | End: 2019-10-22

## 2019-10-22 RX ORDER — CLONAZEPAM 1 MG
2 TABLET ORAL EVERY 12 HOURS
Refills: 0 | Status: DISCONTINUED | OUTPATIENT
Start: 2019-10-22 | End: 2019-10-23

## 2019-10-22 RX ORDER — QUETIAPINE FUMARATE 200 MG/1
150 TABLET, FILM COATED ORAL AT BEDTIME
Refills: 0 | Status: DISCONTINUED | OUTPATIENT
Start: 2019-10-22 | End: 2019-10-23

## 2019-10-22 RX ORDER — CLONAZEPAM 1 MG
2 TABLET ORAL ONCE
Refills: 0 | Status: DISCONTINUED | OUTPATIENT
Start: 2019-10-22 | End: 2019-10-22

## 2019-10-22 RX ORDER — HALOPERIDOL DECANOATE 100 MG/ML
5 INJECTION INTRAMUSCULAR ONCE
Refills: 0 | Status: COMPLETED | OUTPATIENT
Start: 2019-10-22 | End: 2019-10-22

## 2019-10-22 RX ORDER — DIPHENHYDRAMINE HCL 50 MG
50 CAPSULE ORAL ONCE
Refills: 0 | Status: COMPLETED | OUTPATIENT
Start: 2019-10-22 | End: 2019-10-22

## 2019-10-22 RX ORDER — DIPHENHYDRAMINE HCL 50 MG
50 CAPSULE ORAL EVERY 6 HOURS
Refills: 0 | Status: DISCONTINUED | OUTPATIENT
Start: 2019-10-22 | End: 2019-11-05

## 2019-10-22 RX ADMIN — Medication 2 MILLIGRAM(S): at 16:45

## 2019-10-22 RX ADMIN — Medication 50 MILLIGRAM(S): at 16:45

## 2019-10-22 RX ADMIN — BUPRENORPHINE AND NALOXONE 1 TABLET(S): 2; .5 TABLET SUBLINGUAL at 08:57

## 2019-10-22 RX ADMIN — BUPRENORPHINE AND NALOXONE 1 TABLET(S): 2; .5 TABLET SUBLINGUAL at 11:04

## 2019-10-22 RX ADMIN — Medication 2 MILLIGRAM(S): at 15:25

## 2019-10-22 RX ADMIN — HALOPERIDOL DECANOATE 5 MILLIGRAM(S): 100 INJECTION INTRAMUSCULAR at 16:45

## 2019-10-22 RX ADMIN — QUETIAPINE FUMARATE 50 MILLIGRAM(S): 200 TABLET, FILM COATED ORAL at 15:25

## 2019-10-22 RX ADMIN — Medication 2 MILLIGRAM(S): at 08:56

## 2019-10-22 NOTE — ED ADULT NURSE REASSESSMENT NOTE - NS ED NURSE REASSESS COMMENT FT1
Patient continues on 1:1 in Room O. Patient is requesting "morning meds." Spoke to Pharmacy about providing medications.

## 2019-10-22 NOTE — ED BEHAVIORAL HEALTH NOTE - BEHAVIORAL HEALTH NOTE
Called by ED staff because pt was becoming more agitated with pacing and threatening stance. Adamaris Craig from [facility], who knows the pt, came down to speak with him. By the time I spoke with him he was significantly calmer. He agreed to klonopin 2 mg and seroquel 50 mg for anxiety and it should also help with paranoia and agitation. Spoke with [facility] and they say the room is nearly ready. Prolonged ED wait seems to be a major contributing factor to his agitation, in addition to his palpable paranoia.

## 2019-10-22 NOTE — CHART NOTE - NSCHARTNOTEFT_GEN_A_CORE
Asked to evaluate pt, who is currently in locked seclusion for up to 4 hours, which was initiated at 4:46.  Pt is currently observed standing in back of seclusion room, arms crossed, menacing affect.  He states, "I am not going to sleep!! I want my phone and I want to eat!!!" Pt observed to be fighting sleep.    For now:    Continue locked seclusion for safety.  Defer additional medication, as pt is likely to respond to sedating effects of prior IM medications.  Will provide handoff information to on-call M.D. starting shift at 6 PM.

## 2019-10-22 NOTE — ED ADULT NURSE REASSESSMENT NOTE - NS ED NURSE REASSESS COMMENT FT1
Pt noted to be pacing up and down glass, is agitated and is presenting with more signs of paranoia. Psych calling and management made aware.

## 2019-10-23 DIAGNOSIS — F19.950 OTHER PSYCHOACTIVE SUBSTANCE USE, UNSPECIFIED WITH PSYCHOACTIVE SUBSTANCE-INDUCED PSYCHOTIC DISORDER WITH DELUSIONS: ICD-10-CM

## 2019-10-23 DIAGNOSIS — F43.10 POST-TRAUMATIC STRESS DISORDER, UNSPECIFIED: ICD-10-CM

## 2019-10-23 DIAGNOSIS — Z00.00 ENCOUNTER FOR GENERAL ADULT MEDICAL EXAMINATION WITHOUT ABNORMAL FINDINGS: ICD-10-CM

## 2019-10-23 DIAGNOSIS — F17.200 NICOTINE DEPENDENCE, UNSPECIFIED, UNCOMPLICATED: ICD-10-CM

## 2019-10-23 DIAGNOSIS — F90.9 ATTENTION-DEFICIT HYPERACTIVITY DISORDER, UNSPECIFIED TYPE: ICD-10-CM

## 2019-10-23 DIAGNOSIS — F22 DELUSIONAL DISORDERS: ICD-10-CM

## 2019-10-23 LAB
CHOLEST SERPL-MCNC: 120 MG/DL — SIGNIFICANT CHANGE UP (ref 10–199)
HBA1C BLD-MCNC: 5.6 % — SIGNIFICANT CHANGE UP (ref 4–5.6)
HDLC SERPL-MCNC: 50 MG/DL — SIGNIFICANT CHANGE UP
LIPID PNL WITH DIRECT LDL SERPL: 49 MG/DL — SIGNIFICANT CHANGE UP
TOTAL CHOLESTEROL/HDL RATIO MEASUREMENT: 2.4 RATIO — LOW (ref 3.4–9.6)
TRIGL SERPL-MCNC: 107 MG/DL — SIGNIFICANT CHANGE UP (ref 10–149)

## 2019-10-23 PROCEDURE — 99233 SBSQ HOSP IP/OBS HIGH 50: CPT

## 2019-10-23 RX ORDER — OLANZAPINE 15 MG/1
10 TABLET, FILM COATED ORAL AT BEDTIME
Refills: 0 | Status: DISCONTINUED | OUTPATIENT
Start: 2019-10-23 | End: 2019-10-29

## 2019-10-23 RX ORDER — HYDROXYZINE HCL 10 MG
50 TABLET ORAL EVERY 6 HOURS
Refills: 0 | Status: DISCONTINUED | OUTPATIENT
Start: 2019-10-23 | End: 2019-11-05

## 2019-10-23 RX ORDER — NICOTINE POLACRILEX 2 MG
1 GUM BUCCAL DAILY
Refills: 0 | Status: DISCONTINUED | OUTPATIENT
Start: 2019-10-23 | End: 2019-11-05

## 2019-10-23 RX ORDER — TRAZODONE HCL 50 MG
50 TABLET ORAL AT BEDTIME
Refills: 0 | Status: DISCONTINUED | OUTPATIENT
Start: 2019-10-23 | End: 2019-11-05

## 2019-10-23 RX ORDER — CLONAZEPAM 1 MG
1 TABLET ORAL
Refills: 0 | Status: DISCONTINUED | OUTPATIENT
Start: 2019-10-23 | End: 2019-10-24

## 2019-10-23 RX ADMIN — Medication 50 MILLIGRAM(S): at 22:14

## 2019-10-23 RX ADMIN — Medication 1 MILLIGRAM(S): at 22:14

## 2019-10-23 RX ADMIN — BUPRENORPHINE AND NALOXONE 1 TABLET(S): 2; .5 TABLET SUBLINGUAL at 07:43

## 2019-10-23 RX ADMIN — BUPRENORPHINE AND NALOXONE 1 TABLET(S): 2; .5 TABLET SUBLINGUAL at 22:14

## 2019-10-23 RX ADMIN — Medication 2 MILLIGRAM(S): at 07:43

## 2019-10-23 RX ADMIN — Medication 50 MILLIGRAM(S): at 22:00

## 2019-10-23 RX ADMIN — Medication 2 MILLIGRAM(S): at 17:02

## 2019-10-23 RX ADMIN — BUPRENORPHINE AND NALOXONE 1 TABLET(S): 2; .5 TABLET SUBLINGUAL at 13:35

## 2019-10-23 NOTE — BEHAVIORAL HEALTH ASSESSMENT NOTE - NSBHREFERDETAILS_PSY_A_CORE_FT
Self presented to ED As per ED assessment note 10/21/2019:  "50yo  Male, currently domiciled in [address], under the care of Pamela Snider psychiatric NP, known to [facility] from most recent admission Dec '18- Mar '19, reporting paranoid thoughts so severe in nature they are causing him such significant distress he currently feels the only way to end his frustration is to jump off a bridge. Given pt's past hx of suicide attempts and psychiatric admissions, combined with his current acute paranoid ideation, at this time patient represents a high risk of danger to himself, and requires admission to a psychiatric unit for symptom stabilization."

## 2019-10-23 NOTE — BEHAVIORAL HEALTH ASSESSMENT NOTE - NSBHCHARTREVIEWVS_PSY_A_CORE FT
Vital Signs Last 24 Hrs  T(C): 36.9 (23 Oct 2019 09:00), Max: 36.9 (22 Oct 2019 16:59)  T(F): 98.5 (23 Oct 2019 09:00), Max: 98.5 (23 Oct 2019 09:00)  HR: 52 (23 Oct 2019 09:00) (52 - 66)  BP: 142/86 (23 Oct 2019 09:00) (112/87 - 142/86)  BP(mean): --  RR: 20 (23 Oct 2019 09:00) (19 - 20)  SpO2: 97% (23 Oct 2019 09:00) (94% - 97%)

## 2019-10-23 NOTE — BEHAVIORAL HEALTH ASSESSMENT NOTE - VIOLENCE RISK FACTORS:
Antisocial behavior/cognition (past or present)/Lack of insight into violence risk/need for treatment/Substance abuse/Feeling of being under threat and being unable to control threat/Irritability

## 2019-10-23 NOTE — BEHAVIORAL HEALTH ASSESSMENT NOTE - ACTIVATING EVENTS/STRESSORS
Triggering events leading to humiliation, shame, and/or despair (e.g. Loss of relationship, financial or health status) (real or anticipated)/Change in provider or treatment (i.e., medications, psychotherapy, milieu)/Legal problems

## 2019-10-23 NOTE — BEHAVIORAL HEALTH ASSESSMENT NOTE - OTHER PAST PSYCHIATRIC HISTORY (INCLUDE DETAILS REGARDING ONSET, COURSE OF ILLNESS, INPATIENT/OUTPATIENT TREATMENT)
4-5 prior psychiatric hospitalizations. Most recent hospitalization at [facility] for paranoia. Pt reports Hx of diagnosis of PTSD; [facility] EMR indicates diagnoses of Paranoid Disorder and ASPD. He has 4-5 prior psychiatric hospitalizations, most recent hospitalization at [facility] for six months in late 2018-early 2019. Pt reports 1 prior suicide attempt in  when his mother , EMR indicates another attempt in . He denies having an outpatient psychiatrist; states that he takes home medications but cannot name them. Per pt's pharmacy, he has had recent prescriptions for Suboxone 8mg/2mg 2-3xdaily, Remeron 15mg qhs, clonidine 0.2mg qhs, buspar 15mg bid, Zubsolv 11.4mg daily, medical klonopin 1.5mg bid.

## 2019-10-23 NOTE — BEHAVIORAL HEALTH ASSESSMENT NOTE - DETAILS
Did not elaborate ER report notes attempt in . Patient endorsed attempted in  when mother , undisclosed method. In ER yesterday, patient endorses SI but denies it on current assessment.

## 2019-10-23 NOTE — BEHAVIORAL HEALTH ASSESSMENT NOTE - NSBHMEDSOTHERFT_PSY_A_CORE
Saboxone 8mg/2mg 2-3xdaily, Remeron 15mg qhs, clonidine 0.2mg qhs, buspar 15mg bid, Zubsolv 11.4mg daily, medical klonopin 1.5mg bid. Suboxone 8mg/2mg 2-3xdaily, Remeron 15mg qhs, clonidine 0.2mg qhs, buspar 15mg bid, Zubsolv 11.4mg daily, medical klonopin 1.5mg bid. Per pt's pharmacy, he has had recent prescriptions for Suboxone 8mg/2mg 2-3xdaily, Remeron 15mg qhs, clonidine 0.2mg qhs, buspar 15mg bid, Zubsolv 11.4mg daily, medical klonopin 1.5mg bid.

## 2019-10-23 NOTE — BEHAVIORAL HEALTH ASSESSMENT NOTE - NSBHCHARTREVIEWINVESTIGATE_PSY_A_CORE FT
10/21/0219:  Ventricular Rate 65 BPM  Atrial Rate 65 BPM  P-R Interval 166 ms  QRS Duration 88 ms  Q-T Interval 382 ms  QTC Calculation(Bezet) 397 ms  P Axis 51 degrees  R Axis 37 degrees  T Axis 48 degrees  Diagnosis Line Normal sinus rhythm  RSR' in V1
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
## 2019-10-23 NOTE — BEHAVIORAL HEALTH ASSESSMENT NOTE - PROBLEM SELECTOR PROBLEM 2
PTSD (post-traumatic stress disorder) Attention deficit hyperactivity disorder (ADHD), unspecified ADHD type Healthcare maintenance

## 2019-10-23 NOTE — BEHAVIORAL HEALTH ASSESSMENT NOTE - DIFFERENTIAL
Paranoid disorder vs delusional disorder Substance induced psychosis vs. antisocial Psychosis NOS  Substance-induced psychosis  Polysubstance use d/o (opioids, amphetamines, benzodiazepines)

## 2019-10-23 NOTE — BEHAVIORAL HEALTH ASSESSMENT NOTE - LEGAL HISTORY
Arrested for drug possession. Currently on parole. Arrested for drug possession. Currently on parole. Reports multiple imprisonments, most recently released from [facility] in August 2019.

## 2019-10-23 NOTE — BEHAVIORAL HEALTH ASSESSMENT NOTE - SECONDARY DX1
Delusional disorder currently symptomatic Attention deficit hyperactivity disorder (ADHD), unspecified ADHD type Healthcare maintenance

## 2019-10-23 NOTE — BEHAVIORAL HEALTH ASSESSMENT NOTE - NSBHLEGALSTATUS_PSY_A_CORE
9.27 (2PC) 9.13 (Voluntary)/Plan for conversion to 2PC Planned for conversion to 2P/9.13 (Voluntary)

## 2019-10-23 NOTE — BEHAVIORAL HEALTH ASSESSMENT NOTE - NSBHADMITIPSTRENGTH_PSY_A_CORE
Has access to housing/residential stability/Awareness of substance use issues/Has supportive interpersonal relationships with family, friends or peers/In good physical health

## 2019-10-23 NOTE — BEHAVIORAL HEALTH ASSESSMENT NOTE - HPI (INCLUDE ILLNESS QUALITY, SEVERITY, DURATION, TIMING, CONTEXT, MODIFYING FACTORS, ASSOCIATED SIGNS AND SYMPTOMS)
50 y/o  M, domiciled at a shelter, unemployed, hx of incarceration, PPhx of 3 SA, most recent attempt in 2013, method undisclosed, 4-5 psychiatric hospitalizations, mostly recently at [facility] 12/2018, self reported pphx of PTSD, PMHx of resolved hepatitis C self presented to the ED due to the idea "I'm being followed". In the ED, patient was noted to be constantly surveying the area and questioning personnel if they were "a ". Upon assessment, patient complained "a group of people are bothering me" and was guarded and an unreliable historian. Patient's medical records were referenced for additional information. Patient reports feeling anxious and depressed the last few days and mad at "a group of people" for "bothering." When asked to elaborate, patient responded "I think you know; everyone knows." Patient appeared agitated and anxious, avoiding eye contact. Patient currently denies suicidal ideation, however, ED reported patient alluded to suicide by jumping off a bridge as 'the only way to end the torment'. 48 y/o  M, domiciled at a shelter, unemployed, hx of incarceration, PPhx of 3 SA, most recent attempt in 2013, method undisclosed, 4-5 psychiatric hospitalizations, mostly recently at [facility] 12/2018, self-reported pphx of PTSD, PMHx of resolved hepatitis C self presented to the ED due to the idea "I'm being followed". In the ED, patient was noted to be constantly surveying the area and questioning personnel if they were "a ". Upon assessment, patient complained "a group of people are bothering me" and was guarded and an unreliable historian. Patient's medical records were referenced for additional information. Patient reports feeling anxious and depressed the last few days and mad at "a group of people" for "bothering." When asked to elaborate, patient responded "I think you know; everyone knows." Patient appeared agitated and anxious, avoiding eye contact. Patient currently denies suicidal ideation, however, ED reported patient alluded to suicide by jumping off a bridge as 'the only way to end the torment'. Patient reports periods of high energy without needing to sleep, and racing thoughts, though last known episode is unknown. Patient currently denies AVH or HI. Pt self-presented to the ED due to the idea "I'm being followed". On interview pt was guarded and an unreliable historian. Patient's medical records were referenced for additional information. In the ED, patient was noted to be constantly surveying the area and questioning personnel if they were "a ". Upon admission, patient complained "a group of people are bothering me." Patient reports feeling anxious and depressed the last few days and mad at "a group of people" for "bothering" him. When asked to elaborate, patient responded "I think you know; everyone knows." He was not able to name any specific individuals or events related to this concern. Patient currently denies suicidal ideation, however, ED reported patient alluded to suicide by jumping off a bridge as 'the only way to end the torment'. Patient occasional periods of decreased sleep and racing thoughts, but denies other symptoms of jessica/psychosis. Patient currently denies AVH or HI. He reports a Hx of diagnosis of PTSD and says that he has nightmares but refuses to describe specific trauma history. Pt states that he takes psychiatric medications but cannot name them or who prescribes them, states "I just have them."  Of note, when first admitted to the unit pt was agitated and physically aggressive toward security staff, requiring stat IM medications. Since admission he has submitted a 72 hour letter for discharge despite having come to the ER voluntarily. When asked about this today he stated "someone made a mistake" advising him to come to the ER, and that "I made the wrong choice."

## 2019-10-23 NOTE — BEHAVIORAL HEALTH ASSESSMENT NOTE - DESCRIPTION (FIRST USE, LAST USE, QUANTITY, FREQUENCY, DURATION)
"occasional" use Patient currently on clonazepam 2mg TID 0.5pack/day per EMR pt has hx of opioid use d/o; currently on Suboxone reported benzo prescription reported adderal prescription

## 2019-10-23 NOTE — BEHAVIORAL HEALTH ASSESSMENT NOTE - PROBLEM SELECTOR PLAN 1
Buprenorphine 8mg / Naloxone 2mg daily  Buspirone 15mg BID  Clonazepam 1mg at 9am, 3pm  Olanzapine 10mg qHS --> to be titrated up  Trazodone 50mg qHS + 50mg PRN

## 2019-10-23 NOTE — BEHAVIORAL HEALTH ASSESSMENT NOTE - NSBHCHARTREVIEWLAB_PSY_A_CORE FT
10/21/219: Urine tox +THC, +Benzodiazpine, +Amphetamine  10/23/19:  Lipid Panel: Chol 120, , HDL 50, LDL 49  A1c 5.6

## 2019-10-23 NOTE — BEHAVIORAL HEALTH ASSESSMENT NOTE - SECONDARY DX2
Attention deficit hyperactivity disorder (ADHD), unspecified ADHD type PTSD (post-traumatic stress disorder)

## 2019-10-23 NOTE — BEHAVIORAL HEALTH ASSESSMENT NOTE - PRIMARY DX
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
## 2019-10-23 NOTE — BEHAVIORAL HEALTH ASSESSMENT NOTE - SUMMARY
48 y/o  M, domiciled at a shelter, unemployed, hx of incarceration, PPhx of 3 SA, most recent attempt in 2013, method undisclosed, 4-5 psychiatric hospitalizations, mostly recently at [facility] 12/2018, self-reported pphx of PTSD, PMHx of resolved hepatitis C self presented to the ED due to the idea "I'm being followed". 50 y/o  M, domiciled at a shelter, unemployed, PPhx of 3 SA, self-reported pphx of PTSD, presents to the ED for worsening paranoid ideation with suicidal ideation. On current assessment, patient is guarded and an unreliable historian. He mental status is notable for depressed mood, anxiety, agitation, and constricted affect. 50 y/o  M, domiciled at a shelter, unemployed, PPhx of 3 SA, self-reported pphx of PTSD, presents to the ED for worsening paranoid ideation with suicidal ideation. On current assessment, patient is guarded and an unreliable historian. His mental status is notable for depressed mood, anxiety, agitation, and constricted affect. Will start on Olanzapine..... and D/C Adderall as it may worsen anxiety/paranoia. Currently denies SI/HI/AVH. 48 y/o  M, domiciled at a shelter, unemployed, PPhx of paranoid disorder and ASPD (self-reported hx of PTSD), presented to the ED for worsening paranoida and suicidal ideation. On current assessment, pt's mental status is notable for depressed mood, anxiety, constricted affect, paranoid ideation, poverty of thought and illogical thought process. Pt will be started on a regimen of buprenorphine/naloxone, buspirone, clonazepam, olanzapine, and trazodone. Olanzapine to be tapered up as tolerated (this medication worked well for the patient during his last admission). Pt submitted 72 hour letter and is planned for conversion to [facility].

## 2019-10-23 NOTE — BEHAVIORAL HEALTH ASSESSMENT NOTE - SUICIDE RISK FACTORS
Hopelessness or despair/Alcohol/Substance abuse disorders/Current mood episode/Agitation/Severe Anxiety/Panic/PTSD current/past/Conduct problems current/past

## 2019-10-24 PROCEDURE — 99232 SBSQ HOSP IP/OBS MODERATE 35: CPT

## 2019-10-24 RX ORDER — CLONAZEPAM 1 MG
2 TABLET ORAL
Refills: 0 | Status: DISCONTINUED | OUTPATIENT
Start: 2019-10-24 | End: 2019-10-28

## 2019-10-24 RX ADMIN — BUPRENORPHINE AND NALOXONE 1 TABLET(S): 2; .5 TABLET SUBLINGUAL at 10:12

## 2019-10-24 RX ADMIN — Medication 2 MILLIGRAM(S): at 10:17

## 2019-10-24 RX ADMIN — BUPRENORPHINE AND NALOXONE 1 TABLET(S): 2; .5 TABLET SUBLINGUAL at 21:27

## 2019-10-24 RX ADMIN — Medication 50 MILLIGRAM(S): at 21:27

## 2019-10-24 RX ADMIN — BUPRENORPHINE AND NALOXONE 1 TABLET(S): 2; .5 TABLET SUBLINGUAL at 13:14

## 2019-10-24 RX ADMIN — Medication 2 MILLIGRAM(S): at 13:14

## 2019-10-24 NOTE — PROGRESS NOTE BEHAVIORAL HEALTH - NSBHCHARTREVIEWVS_PSY_A_CORE FT
Vital Signs Last 24 Hrs  T(C): 36.9 (23 Oct 2019 17:00), Max: 36.9 (23 Oct 2019 09:00)  T(F): 98.4 (23 Oct 2019 17:00), Max: 98.5 (23 Oct 2019 09:00)  HR: 49 (23 Oct 2019 17:00) (49 - 52)  BP: 119/79 (23 Oct 2019 17:00) (119/79 - 142/86)  BP(mean): --  RR: 17 (23 Oct 2019 17:00) (17 - 20)  SpO2: 97% (23 Oct 2019 17:00) (97% - 97%) Vital Signs Last 24 Hrs  T(C): 36.6 (24 Oct 2019 09:00), Max: 36.9 (23 Oct 2019 17:00)  T(F): 97.9 (24 Oct 2019 09:00), Max: 98.4 (23 Oct 2019 17:00)  HR: 51 (24 Oct 2019 09:00) (49 - 51)  BP: 157/96 (24 Oct 2019 09:00) (119/79 - 157/96)  BP(mean): --  RR: 20 (24 Oct 2019 09:00) (17 - 20)  SpO2: 93% (24 Oct 2019 09:00) (93% - 97%)

## 2019-10-24 NOTE — PROGRESS NOTE BEHAVIORAL HEALTH - PROBLEM SELECTOR PLAN 1
Increase/reschedule clonazepam 1mg po q9am and 3pm to clonazepam 2mg po q9am and 11am  Buprenorphine 8mg / Naloxone 2mg daily  Buspirone 15mg BID  Olanzapine 10mg qHS --> to be titrated up  Trazodone 50mg qHS + 50mg PRN
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
## 2019-10-24 NOTE — PROGRESS NOTE BEHAVIORAL HEALTH - NSBHFUPINTERVALHXFT_PSY_A_CORE
Patient found lying in bed dressed in hospital gown. Patient was superficially cooperative and guarded on interview, staring at the ceiling and avoidant of eye contact, stating symptoms are improved and expressing desire to leave, as well as "coming here was a mistake". When asked if he's felt any changes from the previous day he replied "what's going to be is going to be". When questioned on the group of people from yesterday he stated "they're still bothering me", but would not elaborate. Denies SI/HI/AVH.

## 2019-10-24 NOTE — PROGRESS NOTE BEHAVIORAL HEALTH - SUMMARY
50 y/o  M, domiciled at a shelter, unemployed, PPhx of paranoid disorder and ASPD (self-reported hx of PTSD), presented to the ED for worsening paranoida and suicidal ideation. On current assessment, pt's mental status is notable for superficial cooperation, depressed mood, anxiety, constricted affect, paranoid ideation, poverty of thought and illogical thought process. Pt started on buprenorphine/naloxone, buspirone, clonazepam, olanzapine, and trazodone. Patient is refusing all medications except for trazodone and clonazepam. Patient exhibits less agitation and is more redirect-able. Will increase/reschedule clonazepam 1mg po q9am and 3pm to clonazepam 2mg po q9am and 11am. Olanzapine to be tapered up as tolerated (this medication worked well for the patient during his last admission). Pt submitted 72 hour letter and is planned for conversion to [facility].

## 2019-10-25 PROCEDURE — 99232 SBSQ HOSP IP/OBS MODERATE 35: CPT

## 2019-10-25 RX ORDER — OLANZAPINE 15 MG/1
10 TABLET, FILM COATED ORAL EVERY 6 HOURS
Refills: 0 | Status: DISCONTINUED | OUTPATIENT
Start: 2019-10-25 | End: 2019-11-05

## 2019-10-25 RX ADMIN — Medication 50 MILLIGRAM(S): at 21:35

## 2019-10-25 RX ADMIN — Medication 2 MILLIGRAM(S): at 13:30

## 2019-10-25 RX ADMIN — BUPRENORPHINE AND NALOXONE 1 TABLET(S): 2; .5 TABLET SUBLINGUAL at 18:19

## 2019-10-25 RX ADMIN — Medication 2 MILLIGRAM(S): at 09:34

## 2019-10-25 RX ADMIN — BUPRENORPHINE AND NALOXONE 1 TABLET(S): 2; .5 TABLET SUBLINGUAL at 21:35

## 2019-10-25 RX ADMIN — BUPRENORPHINE AND NALOXONE 1 TABLET(S): 2; .5 TABLET SUBLINGUAL at 07:25

## 2019-10-25 RX ADMIN — Medication 15 MILLIGRAM(S): at 07:26
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
## 2019-10-25 NOTE — PROGRESS NOTE BEHAVIORAL HEALTH - NSBHFUPINTERVALHXFT_PSY_A_CORE
Pt has been inconsistently accepting/refusing medications (refused buspirone until this morning, then accepted it once; refused all zyprexa, accepted all suboxone/clonazepam). He states that he does not want zyprexa due to concerns of weight gain. He also says that he needs to leave because he has multiple appointments. He plans to continue refusing zyprexa and going to court, where care team will request TOO. Pt is still unable to explain the circumstances around his admission, stating that any conversation about it would raise a "theory of psychosis" and the he doesn't want to talk about it.  He says he believes he is "under surveillance" but cannot explain how he knows this.  Pt was approached by attending physician and resident, risks and benefits of zxyprexa were discussed, as well as risks of adderall use causing psychosis. Denies SI/HI/AVH.

## 2019-10-25 NOTE — PROGRESS NOTE BEHAVIORAL HEALTH - PROBLEM SELECTOR PLAN 1
Increase/reschedule clonazepam 1mg po q9am and 3pm to clonazepam 2mg po q9am and 11am  Buprenorphine 8mg / Naloxone 2mg daily  Buspirone 15mg BID  Olanzapine 10mg qHS --> to be titrated up  Trazodone 50mg qHS + 50mg PRN Buprenorphine 8mg / Naloxone 2mg daily  Buspirone 15mg BID  Clonazepam 2mg po q9am and 11am  Olanzapine 10mg qHS --> to be titrated up  Trazodone 50mg qHS + 50mg PRN

## 2019-10-25 NOTE — PROGRESS NOTE BEHAVIORAL HEALTH - NSBHCHARTREVIEWVS_PSY_A_CORE FT
Vital Signs Last 24 Hrs  T(C): 37.2 (24 Oct 2019 16:11), Max: 37.2 (24 Oct 2019 16:11)  T(F): 99 (24 Oct 2019 16:11), Max: 99 (24 Oct 2019 16:11)  HR: 46 (24 Oct 2019 16:11) (46 - 51)  BP: 136/84 (24 Oct 2019 16:11) (136/84 - 157/96)  BP(mean): --  RR: 19 (24 Oct 2019 16:11) (19 - 20)  SpO2: 97% (24 Oct 2019 16:11) (93% - 97%) Vital Signs Last 24 Hrs  T(C): 37.1 (25 Oct 2019 09:00), Max: 37.2 (24 Oct 2019 16:11)  T(F): 98.7 (25 Oct 2019 09:00), Max: 99 (24 Oct 2019 16:11)  HR: 48 (25 Oct 2019 09:00) (46 - 48)  BP: 131/87 (25 Oct 2019 09:00) (131/87 - 136/84)  BP(mean): --  RR: 18 (25 Oct 2019 09:00) (18 - 19)  SpO2: 99% (25 Oct 2019 09:00) (97% - 99%)

## 2019-10-25 NOTE — PROGRESS NOTE BEHAVIORAL HEALTH - SUMMARY
50 y/o  M, domiciled at a shelter, unemployed, PPhx of paranoid disorder and ASPD (self-reported hx of PTSD), presented to the ED for worsening paranoida and suicidal ideation. On current assessment, pt's mental status is notable for superficial cooperation, depressed mood, anxiety, constricted affect, paranoid ideation, poverty of thought and illogical thought process. Pt started on buprenorphine/naloxone, buspirone, clonazepam, olanzapine, and trazodone. Patient is refusing all medications except for trazodone and clonazepam. Patient exhibits less agitation and is more redirect-able. Will increase/reschedule clonazepam 1mg po q9am and 3pm to clonazepam 2mg po q9am and 11am. Olanzapine to be tapered up as tolerated (this medication worked well for the patient during his last admission). Pt submitted 72 hour letter and is planned for conversion to [facility]. 50 y/o  M, domiciled at a shelter, unemployed, PPhx of paranoid disorder and ASPD (self-reported hx of PTSD), presented to the ED for worsening paranoida and suicidal ideation. On current assessment, pt's mental status is notable for superficial cooperation, depressed mood, anxiety, constricted affect, paranoid ideation, poverty of thought and illogical thought process. Pt started on buprenorphine/naloxone, buspirone, clonazepam, olanzapine, and trazodone. Patient has been refusing olanzapine, inconsistently accepting/refusing buspirone, consistently accepting clonazepam/suboxone and trazodone. On current assessment he is more forthcoming but still paranoid/illogical about circumstances around admission, continues to deny having been aggressive on the unit. Olanzapine to be tapered up as tolerated if/when pt starts taking it (this medication worked well for the patient during his last admission). Pt submitted 72 hour letter and is planned for court next week, where care team will request treatment over objection.

## 2019-10-26 RX ADMIN — BUPRENORPHINE AND NALOXONE 1 TABLET(S): 2; .5 TABLET SUBLINGUAL at 20:03

## 2019-10-26 RX ADMIN — BUPRENORPHINE AND NALOXONE 1 TABLET(S): 2; .5 TABLET SUBLINGUAL at 13:52

## 2019-10-26 RX ADMIN — Medication 2 MILLIGRAM(S): at 10:25

## 2019-10-26 RX ADMIN — Medication 2 MILLIGRAM(S): at 13:52

## 2019-10-26 RX ADMIN — Medication 50 MILLIGRAM(S): at 21:25

## 2019-10-26 RX ADMIN — BUPRENORPHINE AND NALOXONE 1 TABLET(S): 2; .5 TABLET SUBLINGUAL at 10:25

## 2019-10-26 NOTE — PROGRESS NOTE BEHAVIORAL HEALTH - RISK ASSESSMENT
Preventive measure Static: past suicide attempts; past psychotic/mood episodes; past violence; substance abuse/dependence; hx of trauma  Modifiable: unemployment, medication non-adherence, current psychotic/mood episode  Protective: willingness to seek help  modifiable factors to be addressed during stay  overall risk of suicide/self-harm and violence/aggression is MODERATE

## 2019-10-26 NOTE — PROGRESS NOTE BEHAVIORAL HEALTH - NSBHCHARTREVIEWVS_PSY_A_CORE FT
Vital Signs Last 24 Hrs  T(C): 37.1 (25 Oct 2019 17:00), Max: 37.1 (25 Oct 2019 09:00)  T(F): 98.7 (25 Oct 2019 17:00), Max: 98.7 (25 Oct 2019 09:00)  HR: 47 (25 Oct 2019 17:00) (47 - 48)  BP: 127/80 (25 Oct 2019 17:00) (127/80 - 131/87)  BP(mean): --  RR: 18 (25 Oct 2019 17:00) (18 - 18)  SpO2: 98% (25 Oct 2019 17:00) (98% - 99%)

## 2019-10-26 NOTE — PROGRESS NOTE BEHAVIORAL HEALTH - NSBHFUPINTERVALHXFT_PSY_A_CORE
No acute events overnight. Minimally cooperative with interview, but focused on being discharged.  Refused last night's Zyprexa and Buspar but accepted Suboxone. Denies SI/HI No acute events overnight. Somewhat cooperative with interview, focused on being discharged. Refused last night's Zyprexa and Buspar but accepted Suboxone. Denies SI/HI

## 2019-10-26 NOTE — PROGRESS NOTE BEHAVIORAL HEALTH - SUMMARY
50 y/o  M, domiciled at a shelter, unemployed, PPhx of paranoid disorder and ASPD (self-reported hx of PTSD), presented to the ED for worsening paranoida and suicidal ideation. On current assessment, pt's mental status is notable for superficial cooperation, depressed mood, anxiety, constricted affect, paranoid ideation, poverty of thought and illogical thought process. Pt started on buprenorphine/naloxone, buspirone, clonazepam, olanzapine, and trazodone. Patient has been refusing olanzapine, inconsistently accepting/refusing buspirone, consistently accepting clonazepam/suboxone and trazodone. On current assessment he is more forthcoming but still paranoid/illogical about circumstances around admission, continues to deny having been aggressive on the unit. Olanzapine to be tapered up as tolerated if/when pt starts taking it (this medication worked well for the patient during his last admission). Pt submitted 72 hour letter and is planned for court next week, where care team will request treatment over objection.

## 2019-10-26 NOTE — PROGRESS NOTE BEHAVIORAL HEALTH - PROBLEM SELECTOR PLAN 1
Buprenorphine 8mg / Naloxone 2mg daily  Buspirone 15mg BID  Clonazepam 2mg po q9am and 11am  Olanzapine 10mg qHS --> to be titrated up  Trazodone 50mg qHS + 50mg PRN

## 2019-10-27 RX ADMIN — BUPRENORPHINE AND NALOXONE 1 TABLET(S): 2; .5 TABLET SUBLINGUAL at 07:52

## 2019-10-27 RX ADMIN — Medication 2 MILLIGRAM(S): at 10:15

## 2019-10-27 RX ADMIN — BUPRENORPHINE AND NALOXONE 1 TABLET(S): 2; .5 TABLET SUBLINGUAL at 21:17

## 2019-10-27 RX ADMIN — Medication 2 MILLIGRAM(S): at 15:26

## 2019-10-27 RX ADMIN — Medication 50 MILLIGRAM(S): at 21:17

## 2019-10-27 RX ADMIN — Medication 1 PATCH: at 10:15

## 2019-10-27 RX ADMIN — Medication 1 PATCH: at 17:46

## 2019-10-27 RX ADMIN — BUPRENORPHINE AND NALOXONE 1 TABLET(S): 2; .5 TABLET SUBLINGUAL at 13:03

## 2019-10-27 NOTE — PROGRESS NOTE BEHAVIORAL HEALTH - NSBHFUPINTERVALHXFT_PSY_A_CORE
No acute events overnight. Requesting 50mg of seroquel which he says is what he gets on the streets. Refused last night's Zyprexa and Buspar but accepted Suboxone. Denies SI/HI No acute events overnight. Requesting 50mg of seroquel which he says is what he gets on the streets. Continues to refuse Zyprexa and Buspar but accepted Suboxone. Denies SI/HI

## 2019-10-27 NOTE — PROGRESS NOTE BEHAVIORAL HEALTH - SUMMARY
50 y/o  M, domiciled at a shelter, unemployed, PPhx of paranoid disorder and ASPD (self-reported hx of PTSD), presented to the ED for worsening paranoida and suicidal ideation. On current assessment, pt's mental status is notable for superficial cooperation, depressed mood, anxiety, constricted affect, paranoid ideation, poverty of thought and illogical thought process. Pt started on buprenorphine/naloxone, buspirone, clonazepam, olanzapine, and trazodone. Patient has been refusing olanzapine, inconsistently accepting/refusing buspirone, consistently accepting clonazepam/suboxone and trazodone. On current assessment he is more forthcoming but still paranoid/illogical about circumstances around admission, continues to deny having been aggressive on the unit. Olanzapine to be tapered up as tolerated if/when pt starts taking it (this medication worked well for the patient during his last admission). Pt submitted 72 hour letter and is planned for court next week, where care team will request treatment over objection. 48 y/o  M, domiciled at a shelter, unemployed, PPhx of paranoid disorder and ASPD (self-reported hx of PTSD), presented to the ED for worsening paranoida and suicidal ideation. On current assessment, pt's mental status is notable for superficial cooperation, depressed mood, anxiety, constricted affect, paranoid ideation, poverty of thought and illogical thought process. Pt started on buprenorphine/naloxone, buspirone, clonazepam, olanzapine, and trazodone. Patient has been refusing olanzapine, inconsistently accepting/refusing buspirone, consistently accepting clonazepam/suboxone and trazodone. On current assessment he is more forthcoming but still paranoid/illogical about circumstances around admission, continues to deny having been aggressive on the unit. Olanzapine to be titrated as tolerated if/when pt starts taking it (this medication worked well for the patient during his last admission). Pt submitted 72 hour letter and is planned for court next week, where care team will request treatment over objection.

## 2019-10-27 NOTE — PROGRESS NOTE BEHAVIORAL HEALTH - RISK ASSESSMENT
Static: past suicide attempts; past psychotic/mood episodes; past violence; substance abuse/dependence; hx of trauma  Modifiable: unemployment, medication non-adherence, current psychotic/mood episode  Protective: willingness to seek help  modifiable factors to be addressed during stay  overall risk of suicide/self-harm and violence/aggression is MODERATE

## 2019-10-28 PROCEDURE — 90853 GROUP PSYCHOTHERAPY: CPT

## 2019-10-28 PROCEDURE — 99232 SBSQ HOSP IP/OBS MODERATE 35: CPT

## 2019-10-28 RX ORDER — BUPRENORPHINE AND NALOXONE 2; .5 MG/1; MG/1
1 TABLET SUBLINGUAL
Refills: 0 | Status: DISCONTINUED | OUTPATIENT
Start: 2019-10-28 | End: 2019-11-04

## 2019-10-28 RX ORDER — CLONAZEPAM 1 MG
2 TABLET ORAL
Refills: 0 | Status: DISCONTINUED | OUTPATIENT
Start: 2019-10-28 | End: 2019-11-04

## 2019-10-28 RX ADMIN — Medication 50 MILLIGRAM(S): at 21:21

## 2019-10-28 RX ADMIN — Medication 2 MILLIGRAM(S): at 10:34

## 2019-10-28 RX ADMIN — Medication 1 PATCH: at 06:48

## 2019-10-28 RX ADMIN — Medication 2 MILLIGRAM(S): at 16:26

## 2019-10-28 RX ADMIN — Medication 15 MILLIGRAM(S): at 22:14

## 2019-10-28 RX ADMIN — BUPRENORPHINE AND NALOXONE 1 TABLET(S): 2; .5 TABLET SUBLINGUAL at 21:21

## 2019-10-28 RX ADMIN — BUPRENORPHINE AND NALOXONE 1 TABLET(S): 2; .5 TABLET SUBLINGUAL at 16:26

## 2019-10-28 RX ADMIN — Medication 15 MILLIGRAM(S): at 07:28

## 2019-10-28 RX ADMIN — BUPRENORPHINE AND NALOXONE 1 TABLET(S): 2; .5 TABLET SUBLINGUAL at 07:29

## 2019-10-28 NOTE — PROGRESS NOTE BEHAVIORAL HEALTH - SUMMARY
50 y/o  M, domiciled at a shelter, unemployed, PPhx of paranoid disorder and ASPD (self-reported hx of PTSD), presented to the ED for worsening paranoida and suicidal ideation. On current assessment, pt's mental status is notable for superficial cooperation, depressed mood, anxiety, constricted affect, paranoid ideation, poverty of thought and illogical thought process. Pt started on buprenorphine/naloxone, buspirone, clonazepam, olanzapine, and trazodone. Patient has been refusing olanzapine, inconsistently accepting/refusing buspirone, consistently accepting clonazepam/suboxone and trazodone. On current assessment he is more forthcoming and more related but still paranoid/illogical about circumstances around admission, continues to deny having been aggressive on the unit. Olanzapine to be titrated as tolerated if/when pt starts taking it (this medication worked well for the patient during his last admission). Pt submitted 72 hour letter and is planned for court next week, where care team will request treatment over objection. 50 y/o  M, domiciled at a shelter, unemployed, PPhx of paranoid disorder and ASPD (self-reported hx of PTSD), presented to the ED for worsening paranoida and suicidal ideation. On current assessment, pt's mental status is notable for superficial cooperation, depressed mood, anxiety, constricted affect, paranoid ideation, poverty of thought and illogical thought process. Pt started on buprenorphine/naloxone, buspirone, clonazepam, olanzapine, and trazodone. Patient has been refusing olanzapine, inconsistently accepting/refusing buspirone, consistently accepting clonazepam/suboxone and trazodone. On current assessment he is more forthcoming and more related but still paranoid/illogical about circumstances around admission, continues to deny having been aggressive on the unit. He has been accepting Trazodone, Clonazepam and Suboxone, refusing Olanzapine and Buspirone. Pt submitted a 72 hour letter and is planned for court where care team will request treatment over objection.

## 2019-10-28 NOTE — PROGRESS NOTE BEHAVIORAL HEALTH - NSBHFUPINTERVALHXFT_PSY_A_CORE
Patient found dressed in hospital gown eating breakfast. Patient was superficially cooperative, but less guarded and more related. He currently denies AVH and states "I should've kept my mouth shut, I'm being abused." Patient says he does not recall lunging at staff on admission and stated he is being accused of an action he did not commit. Patient states his sleep, mood, and appetite have all been "ok". Nursing staff noted patient to be irritable but under control. Currently denies SI/HI. Patient found dressed in hospital gown eating breakfast. Patient was somewhat cooperative, less guarded and better related. He currently denies AVH and states "I should've kept my mouth shut, I'm being abused." Patient says he does not recall lunging at staff on admission and stated he is being accused of an action he did not commit. Patient states his sleep, mood, and appetite have all been "ok". Pt states that he was adherent with outpatient care, going to [facility] outpatient program daily while living in his shelter, taking medications, and "not paranoid." When asked why he came to the hospital he states that he was "being monitored" and it "got to be too much." When he asked who was monitoring him, he cannot identify any individual/organization but alludes to seeing the same cars parked in the same spots and that he had a "feeling" that people in those cars were monitoring him. Currently denies SI/HI.   Pt has been accepting Trazodone, Suboxone and Clonazepam, refusing Olanzapine and Buspirone. Nursing staff noted patient to be irritable but under fair behavioral control. Per staff he was overheard calling the [employer] [facility] complaining of scratches on his back; when asked about this he showed one of the PA students a thin scratch on the back of his neck which he said he got when he was being medicated by staff on the day of admission.

## 2019-10-28 NOTE — PROGRESS NOTE BEHAVIORAL HEALTH - NSBHCHARTREVIEWVS_PSY_A_CORE FT
Vital Signs Last 24 Hrs  T(C): 36.8 (27 Oct 2019 16:20), Max: 36.9 (27 Oct 2019 10:00)  T(F): 98.3 (27 Oct 2019 16:20), Max: 98.5 (27 Oct 2019 10:00)  HR: 52 (27 Oct 2019 16:20) (49 - 52)  BP: 115/73 (27 Oct 2019 16:20) (115/73 - 134/78)  BP(mean): --  RR: 18 (27 Oct 2019 16:20) (18 - 20)  SpO2: 95% (27 Oct 2019 16:20) (95% - 95%) Vital Signs Last 24 Hrs  T(C): 36.5 (28 Oct 2019 09:33), Max: 36.8 (27 Oct 2019 16:20)  T(F): 97.7 (28 Oct 2019 09:33), Max: 98.3 (27 Oct 2019 16:20)  HR: 58 (28 Oct 2019 09:33) (52 - 58)  BP: 150/83 (28 Oct 2019 09:33) (115/73 - 150/83)  BP(mean): --  RR: 20 (28 Oct 2019 09:33) (18 - 20)  SpO2: 98% (28 Oct 2019 09:33) (95% - 98%)

## 2019-10-29 PROCEDURE — 99232 SBSQ HOSP IP/OBS MODERATE 35: CPT

## 2019-10-29 RX ORDER — QUETIAPINE FUMARATE 200 MG/1
100 TABLET, FILM COATED ORAL AT BEDTIME
Refills: 0 | Status: DISCONTINUED | OUTPATIENT
Start: 2019-10-29 | End: 2019-10-30

## 2019-10-29 RX ORDER — QUETIAPINE FUMARATE 200 MG/1
100 TABLET, FILM COATED ORAL EVERY 12 HOURS
Refills: 0 | Status: DISCONTINUED | OUTPATIENT
Start: 2019-10-29 | End: 2019-10-29

## 2019-10-29 RX ORDER — HYDROCORTISONE 1 %
1 OINTMENT (GRAM) TOPICAL DAILY
Refills: 0 | Status: DISCONTINUED | OUTPATIENT
Start: 2019-10-29 | End: 2019-11-05

## 2019-10-29 RX ORDER — QUETIAPINE FUMARATE 200 MG/1
50 TABLET, FILM COATED ORAL DAILY
Refills: 0 | Status: DISCONTINUED | OUTPATIENT
Start: 2019-10-29 | End: 2019-11-05

## 2019-10-29 RX ADMIN — Medication 2 MILLIGRAM(S): at 14:30

## 2019-10-29 RX ADMIN — Medication 15 MILLIGRAM(S): at 17:19

## 2019-10-29 RX ADMIN — BUPRENORPHINE AND NALOXONE 1 TABLET(S): 2; .5 TABLET SUBLINGUAL at 07:38

## 2019-10-29 RX ADMIN — BUPRENORPHINE AND NALOXONE 1 TABLET(S): 2; .5 TABLET SUBLINGUAL at 21:52

## 2019-10-29 RX ADMIN — Medication 2 MILLIGRAM(S): at 10:29

## 2019-10-29 RX ADMIN — BUPRENORPHINE AND NALOXONE 1 TABLET(S): 2; .5 TABLET SUBLINGUAL at 14:30

## 2019-10-29 RX ADMIN — Medication 1 PATCH: at 17:20

## 2019-10-29 RX ADMIN — Medication 1 PATCH: at 10:29

## 2019-10-29 RX ADMIN — QUETIAPINE FUMARATE 100 MILLIGRAM(S): 200 TABLET, FILM COATED ORAL at 21:52

## 2019-10-29 RX ADMIN — Medication 15 MILLIGRAM(S): at 07:38

## 2019-10-29 NOTE — PROGRESS NOTE BEHAVIORAL HEALTH - PROBLEM SELECTOR PLAN 1
-d/c olanzapine 10mg po qhs and initiate Quetiapine 100mg po q12h  Buprenorphine 8mg / Naloxone 2mg daily  Buspirone 15mg BID  Clonazepam 2mg po q9am and 11am  Trazodone 50mg qHS + 50mg PRN -discontinue olanzapine 10mg po qhs   -start Quetiapine 100mg po q12h  Buprenorphine 8mg / Naloxone 2mg daily  Buspirone 15mg BID  Clonazepam 2mg po q9am and 11am  Trazodone 50mg qHS + 50mg PRN -discontinue olanzapine 10mg po qhs   -start Quetiapine 50mg qAM and 100mg qHS  Buprenorphine 8mg / Naloxone 2mg daily  Buspirone 15mg BID  Clonazepam 2mg po q9am and 11am  Trazodone 50mg qHS + 50mg PRN

## 2019-10-29 NOTE — PROGRESS NOTE BEHAVIORAL HEALTH - NSBHFUPINTERVALHXFT_PSY_A_CORE
Patient found dressed in own clothing in his room. Patient appears alert, with constricted affect, and poor eye contact. Patient reports no notable changes in mood or thoughts since yesterday. When asked how he was in general patient stated "I'm feeling hopeful." When asked to elaborate patient only said "about life". Patient reports stable sleep/appetite. Patient found dressed in own clothing in his room. Patient appears alert, with constricted affect, and poor eye contact. Patient reports no notable changes in mood or thoughts since yesterday. When asked how he was in general patient stated "I'm feeling hopeful." When asked to elaborate patient only said "about life". Patient reports stable sleep/appetite.   Pt continues to refuse Olanzapine but has been accepting all other medications including Buspirone yesterday. He has concerns about metabolic side effects of Olanzapine. He reported that he had been taking Quetiapine at home (100mg twice a day) and expressed interest in taking this medication during his hospital stay. Although we believe he would respond better to Olanzapine, care team will discontinue Olanzapine and offer a trial of Quetiapine instead beginning at 100mg twice a day to be titrated upward.  Pt complains of rash around his groin, will be offered hydrocortisone cream. Patient found dressed in own clothing in his room. Patient appears alert, with constricted affect, and poor eye contact. Patient reports no notable changes in mood or thoughts since yesterday. When asked how he was in general patient stated "I'm feeling hopeful." When asked to elaborate patient only said "about life". Patient reports stable sleep/appetite.   Pt continues to refuse Olanzapine but has been accepting all other medications including Buspirone yesterday. He has concerns about metabolic side effects of Olanzapine. He reported that he had been taking Quetiapine at home and expressed interest in taking this medication during his hospital stay. Although we believe he would respond better to Olanzapine, care team will discontinue Olanzapine and offer a trial of Quetiapine instead beginning at 50mg qAM and 100mg qHS to be titrated upward.  Pt complains of rash around his groin, will be offered hydrocortisone cream.

## 2019-10-29 NOTE — PROGRESS NOTE BEHAVIORAL HEALTH - RISK ASSESSMENT
Static: past suicide attempts; past psychotic/mood episodes; past violence; substance abuse/dependence; hx of trauma  Modifiable: unemployment, medication non-adherence, current psychotic/mood episode  Protective: willingness to seek help  modifiable factors to be addressed during stay  overall risk of suicide/self-harm and violence/aggression is MODERATE Static: past suicide attempts; past psychotic/mood episodes; past violence; substance abuse/dependence; hx of trauma  Modifiable: unemployment, medication non-adherence, current psychotic/mood episode  Protective: willingness to seek help  Modifiable factors to be addressed during stay  Overall risk of suicide/self-harm and violence/aggression is MODERATE

## 2019-10-29 NOTE — PROGRESS NOTE BEHAVIORAL HEALTH - NSBHCHARTREVIEWVS_PSY_A_CORE FT
Vital Signs Last 24 Hrs  T(C): 37 (28 Oct 2019 17:06), Max: 37 (28 Oct 2019 17:06)  T(F): 98.6 (28 Oct 2019 17:06), Max: 98.6 (28 Oct 2019 17:06)  HR: 60 (28 Oct 2019 17:06) (58 - 60)  BP: 130/86 (28 Oct 2019 17:06) (130/86 - 150/83)  BP(mean): --  RR: 18 (28 Oct 2019 17:06) (18 - 20)  SpO2: 97% (28 Oct 2019 17:06) (97% - 98%) Vital Signs Last 24 Hrs  T(C): 36.9 (29 Oct 2019 09:00), Max: 37 (28 Oct 2019 17:06)  T(F): 98.5 (29 Oct 2019 09:00), Max: 98.6 (28 Oct 2019 17:06)  HR: 48 (29 Oct 2019 09:00) (48 - 60)  BP: 144/92 (29 Oct 2019 09:00) (130/86 - 144/92)  BP(mean): --  RR: 16 (29 Oct 2019 09:00) (16 - 18)  SpO2: 94% (29 Oct 2019 09:00) (94% - 97%)

## 2019-10-29 NOTE — PROGRESS NOTE BEHAVIORAL HEALTH - SUMMARY
50 y/o  M, domiciled at a shelter, unemployed, PPhx of paranoid disorder and ASPD (self-reported hx of PTSD), presented to the ED for worsening paranoida and suicidal ideation. On current assessment, pt's mental status is notable for superficial cooperation, depressed mood, anxiety, constricted affect, paranoid ideation, poverty of thought and illogical thought process. Pt started on buprenorphine/naloxone, buspirone, clonazepam, olanzapine, and trazodone. Patient has been refusing olanzapine, inconsistently accepting/refusing buspirone, consistently accepting clonazepam/suboxone and trazodone. On current assessment he is more related but still paranoid/illogical about circumstances around admission, continues to deny having been aggressive on the unit. He has been accepting Trazodone, Clonazepam and Suboxone, refusing Olanzapine and Buspirone. Patient stated he would accept Quetiapine; will d/c olanzapine and initiate Quetiapine 100mg q12h. Pt submitted a 72 hour letter and is planned for court where care team will request treatment over objection. 48 y/o  M, domiciled at a shelter, unemployed, PPhx of paranoid disorder and ASPD (self-reported hx of PTSD), presented to the ED for worsening paranoida and suicidal ideation. On current assessment, pt's mental status is notable for superficial cooperation, depressed mood, anxiety, constricted affect, paranoid ideation, poverty of thought and illogical thought process. Pt started on buprenorphine/naloxone, buspirone, clonazepam, olanzapine, and trazodone. Patient has been refusing olanzapine, inconsistently accepting/refusing buspirone, consistently accepting clonazepam/suboxone and trazodone. On current assessment he is more related but still paranoid/illogical about circumstances around admission, continues to deny having been aggressive on the unit. He has been accepting Trazodone, Clonazepam and Suboxone, refusing Olanzapine and Buspirone. Patient stated he would accept Quetiapine; will d/c olanzapine and initiate Quetiapine. Pt submitted a 72 hour letter and is planned for court where care team will request treatment over objection.

## 2019-10-30 PROCEDURE — 90792 PSYCH DIAG EVAL W/MED SRVCS: CPT

## 2019-10-30 RX ORDER — QUETIAPINE FUMARATE 200 MG/1
200 TABLET, FILM COATED ORAL AT BEDTIME
Refills: 0 | Status: DISCONTINUED | OUTPATIENT
Start: 2019-10-30 | End: 2019-11-05

## 2019-10-30 RX ADMIN — BUPRENORPHINE AND NALOXONE 1 TABLET(S): 2; .5 TABLET SUBLINGUAL at 07:52

## 2019-10-30 RX ADMIN — Medication 2 MILLIGRAM(S): at 10:54

## 2019-10-30 RX ADMIN — BUPRENORPHINE AND NALOXONE 1 TABLET(S): 2; .5 TABLET SUBLINGUAL at 13:50

## 2019-10-30 RX ADMIN — BUPRENORPHINE AND NALOXONE 1 TABLET(S): 2; .5 TABLET SUBLINGUAL at 19:32

## 2019-10-30 RX ADMIN — Medication 1 PATCH: at 10:58

## 2019-10-30 RX ADMIN — QUETIAPINE FUMARATE 200 MILLIGRAM(S): 200 TABLET, FILM COATED ORAL at 21:37

## 2019-10-30 RX ADMIN — Medication 1 APPLICATION(S): at 10:54

## 2019-10-30 RX ADMIN — Medication 2 MILLIGRAM(S): at 13:50

## 2019-10-30 RX ADMIN — Medication 1 PATCH: at 06:19

## 2019-10-30 RX ADMIN — QUETIAPINE FUMARATE 50 MILLIGRAM(S): 200 TABLET, FILM COATED ORAL at 10:58

## 2019-10-30 NOTE — PROGRESS NOTE BEHAVIORAL HEALTH - PROBLEM SELECTOR PLAN 1
-Quetiapine 50mg qAM and 100mg QHS increased to 50mg qAM and 200mg QHS to reach therapeutic dose  -discontinue olanzapine 10mg po qhs   Buprenorphine 8mg / Naloxone 2mg daily  Buspirone 15mg BID  Clonazepam 2mg po q9am and 11am  Trazodone 50mg qHS + 50mg PRN Quetiapine increased to 50mg qAM and 200mg QHS to reach therapeutic dose  Buprenorphine 8mg / Naloxone 2mg daily  Buspirone 15mg BID  Clonazepam 2mg po q9am and 11am  Trazodone 50mg qHS + 50mg PRN

## 2019-10-30 NOTE — PROGRESS NOTE BEHAVIORAL HEALTH - NSBHFUPINTERVALHXFT_PSY_A_CORE
Patient found sitting on bed dressed in his own clothing. Patient avoids eye contact and affect in constricted. Patient started on quetiapine 100mg yesterday with no JOSE C. Patient refused trazodone, but accepted buprenorphine/naloxone, buspirone, clonazepam, nicotine patch, cortisol cream, and quetiapine. When asked about circumstances of his admission, patient remained guarded and said "I know when I'm being surveillanced, I've been on the streets." when asked to clarify if he felt surveilled in the hospital or out side he said "Out there. I'm just going to keep my mouth shut". Patient currently denies SI/HI/AVH and stated "I never claimed I wanted to jump off a bridge". Patient found sitting on bed dressed in his own clothing. Patient started on quetiapine 100mg yesterday with no adverse effects. Patient refused trazodone last night, but accepted buprenorphine/naloxone, buspirone, clonazepam, and quetiapine. When asked about circumstances of his admission, patient remained guarded and said "I know when I'm being surveillanced, I've been on the streets." when asked to clarify if he felt surveilled in the hospital or out side he said "Out there. I'm just going to keep my mouth shut". Patient currently denies SI/HI/AVH and stated "I never claimed I wanted to jump off a bridge". He reports sleeping well and feeling better after taking Quetiapine and agreed to increasing the dose (states he was previously on 350mg total daily). We will keep AM dose at 50mg and increase qHS dose to 200mg. Pt is still planned for court on Friday 11/1 for retention hearing.

## 2019-10-30 NOTE — PROGRESS NOTE BEHAVIORAL HEALTH - SUMMARY
50 y/o  M, domiciled at a shelter, unemployed, PPhx of paranoid disorder and ASPD (self-reported hx of PTSD), presented to the ED for worsening paranoida and suicidal ideation. On current assessment, pt's mental status is notable for superficial cooperation, depressed mood, anxiety, constricted affect, paranoid ideation, poverty of thought and illogical thought process. Pt started on buprenorphine/naloxone, buspirone, clonazepam, olanzapine, and trazodone. Patient has been refusing olanzapine, inconsistently accepting/refusing buspirone and trazodone, consistently accepting clonazepam/suboxone, and currently compliant with initiated home quetiapine. On current assessment still paranoid/illogical and guarded about circumstances around admission, continues to deny having been aggressive on the unit. He has accepted, Clonazepam, Buspirone, Seroquel and Suboxone, refusing Olanzapine, Trazodone, and Buspirone over the past day. Quetiapine 50mg qAM and 100mg QHS increased to 50mg qAM and 200mg QHS to reach therapeutic dose. Pt submitted a 72 hour letter and is planned for court where care team will request treatment over objection. 50 y/o  M, domiciled at a shelter, unemployed, PPhx of paranoid disorder and ASPD (self-reported hx of PTSD), presented to the ED for worsening paranoida and suicidal ideation. On admission he had an episode of physical aggression requiring stat IM medications. On intial assessment, pt's mental status was notable for superficial cooperation, depressed mood, anxiety, constricted affect, paranoid ideation, poverty of thought and illogical thought process. Pt started on buprenorphine/naloxone, buspirone, clonazepam, olanzapine, and trazodone. He was intermittently non-adherent with medications, but consistently refused Olanzapine due to concerns about metabolic side effects. He was switched over to Quetiapine which he has been accepting; will be titrated upward. On current assessment he is calm, cooperative, organized, and goal-directed, but still paranoid/illogical and guarded about circumstances around admission, continues to deny having been aggressive on the unit. Pt submitted a 72 hour letter and is planned for court on 11/1/19 for retention hearing.

## 2019-10-30 NOTE — PROGRESS NOTE BEHAVIORAL HEALTH - NSBHCHARTREVIEWVS_PSY_A_CORE FT
Vital Signs Last 24 Hrs  T(C): 37 (29 Oct 2019 16:43), Max: 37 (29 Oct 2019 16:43)  T(F): 98.6 (29 Oct 2019 16:43), Max: 98.6 (29 Oct 2019 16:43)  HR: 54 (29 Oct 2019 16:43) (48 - 54)  BP: 129/85 (29 Oct 2019 16:43) (129/85 - 144/92)  BP(mean): --  RR: 18 (29 Oct 2019 16:43) (16 - 18)  SpO2: 97% (29 Oct 2019 16:43) (94% - 97%) Vital Signs Last 24 Hrs  T(C): 36.4 (30 Oct 2019 10:37), Max: 37 (29 Oct 2019 16:43)  T(F): 97.5 (30 Oct 2019 10:37), Max: 98.6 (29 Oct 2019 16:43)  HR: 50 (30 Oct 2019 10:37) (50 - 54)  BP: 134/70 (30 Oct 2019 10:37) (129/85 - 134/70)  BP(mean): --  RR: 15 (30 Oct 2019 10:37) (15 - 18)  SpO2: 91% (30 Oct 2019 10:37) (91% - 97%)

## 2019-10-31 PROCEDURE — 90792 PSYCH DIAG EVAL W/MED SRVCS: CPT

## 2019-10-31 RX ADMIN — BUPRENORPHINE AND NALOXONE 1 TABLET(S): 2; .5 TABLET SUBLINGUAL at 13:16

## 2019-10-31 RX ADMIN — Medication 1 APPLICATION(S): at 09:43

## 2019-10-31 RX ADMIN — Medication 2 MILLIGRAM(S): at 07:56

## 2019-10-31 RX ADMIN — BUPRENORPHINE AND NALOXONE 1 TABLET(S): 2; .5 TABLET SUBLINGUAL at 07:55

## 2019-10-31 RX ADMIN — Medication 2 MILLIGRAM(S): at 13:16

## 2019-10-31 RX ADMIN — BUPRENORPHINE AND NALOXONE 1 TABLET(S): 2; .5 TABLET SUBLINGUAL at 19:27

## 2019-10-31 RX ADMIN — QUETIAPINE FUMARATE 200 MILLIGRAM(S): 200 TABLET, FILM COATED ORAL at 21:41

## 2019-10-31 RX ADMIN — QUETIAPINE FUMARATE 50 MILLIGRAM(S): 200 TABLET, FILM COATED ORAL at 09:43

## 2019-10-31 NOTE — PROGRESS NOTE BEHAVIORAL HEALTH - NSBHFUPINTERVALHXFT_PSY_A_CORE
Staff reports patient is guarded and oddly related, but under behavioral control.  Patient found dressed in his own clothing, lying in bed, with newspaper on his lap, but gazing at the wall. Patient is superficially cooperative, oddly related, and guarded, with poor eye contact, and constricted affect. Patient responds to all questions about his mood, sleep, appetite, concerns, with "I'm good". Seroquel was increased to 50mg AM and 200mg PM with no JOSE C. Patient took clonazepam, suboxone, and seroquel and has refused trazodone, buspirone, and his nicotine patch. Denies SI/HI/AVH. Staff reports patient is guarded and oddly related, but under behavioral control.  Patient found dressed in his own clothing, lying in bed, with newspaper on his lap, but gazing at the wall. Patient is superficially cooperative, oddly related, and guarded, with poor eye contact, and constricted affect. Patient responds to all questions about his mood, sleep, appetite, concerns, with "I'm good". Seroquel was increased to 50mg AM and 200mg PM yesterday with no JOSE C. Patient took clonazepam, suboxone, and seroquel and has refused trazodone, buspirone, and his nicotine patch. Denies SI/HI/AVH.

## 2019-10-31 NOTE — PROGRESS NOTE BEHAVIORAL HEALTH - SUMMARY
48 y/o  M, domiciled at a shelter, unemployed, PPhx of paranoid disorder and ASPD (self-reported hx of PTSD), presented to the ED for worsening paranoida and suicidal ideation. On admission he had an episode of physical aggression requiring stat IM medications. On intial assessment, pt's mental status was notable for superficial cooperation, depressed mood, anxiety, constricted affect, paranoid ideation, poverty of thought and illogical thought process. Pt started on buprenorphine/naloxone, buspirone, clonazepam, olanzapine, and trazodone. He was intermittently non-adherent with medications, but consistently refused Olanzapine due to concerns about metabolic side effects. He was switched over to Quetiapine which he has been accepting; will be titrated upward. On current assessment he is calm, superficially cooperative, guarded, organized, and goal-directed, but oddly related and still paranoid/illogical and guarded about circumstances around admission, continues to deny having been aggressive on the unit. Pt submitted a 72 hour letter and is planned for court on 11/1/19 for retention hearing.

## 2019-10-31 NOTE — PROGRESS NOTE BEHAVIORAL HEALTH - NSBHCHARTREVIEWVS_PSY_A_CORE FT
Vital Signs Last 24 Hrs  T(C): 36.4 (30 Oct 2019 10:37), Max: 36.4 (30 Oct 2019 10:37)  T(F): 97.5 (30 Oct 2019 10:37), Max: 97.5 (30 Oct 2019 10:37)  HR: 50 (30 Oct 2019 10:37) (50 - 50)  BP: 134/70 (30 Oct 2019 10:37) (134/70 - 134/70)  BP(mean): --  RR: 15 (30 Oct 2019 10:37) (15 - 15)  SpO2: 91% (30 Oct 2019 10:37) (91% - 91%) Vital Signs Last 24 Hrs (Patient refused 10/30/19 17:06)  T(C): --  T(F): --  HR: --  BP: --  BP(mean): --  RR: --  SpO2: --

## 2019-10-31 NOTE — PROGRESS NOTE BEHAVIORAL HEALTH - PROBLEM SELECTOR PLAN 1
c/w Quetiapine 50mg qAM and 200mg QHS.  Buprenorphine 8mg / Naloxone 2mg daily  Buspirone 15mg BID  Clonazepam 2mg po q9am and 11am  Trazodone 50mg qHS + 50mg PRN

## 2019-11-01 PROCEDURE — 99232 SBSQ HOSP IP/OBS MODERATE 35: CPT

## 2019-11-01 RX ADMIN — Medication 2 MILLIGRAM(S): at 08:47

## 2019-11-01 RX ADMIN — BUPRENORPHINE AND NALOXONE 1 TABLET(S): 2; .5 TABLET SUBLINGUAL at 21:41

## 2019-11-01 RX ADMIN — BUPRENORPHINE AND NALOXONE 1 TABLET(S): 2; .5 TABLET SUBLINGUAL at 15:52

## 2019-11-01 RX ADMIN — BUPRENORPHINE AND NALOXONE 1 TABLET(S): 2; .5 TABLET SUBLINGUAL at 07:14

## 2019-11-01 RX ADMIN — Medication 50 MILLIGRAM(S): at 21:41

## 2019-11-01 RX ADMIN — Medication 2 MILLIGRAM(S): at 15:52

## 2019-11-01 NOTE — PROGRESS NOTE BEHAVIORAL HEALTH - RISK ASSESSMENT
Static: past suicide attempts; past psychotic/mood episodes; past violence; substance abuse/dependence; hx of trauma  Modifiable: unemployment, medication non-adherence, current psychotic/mood episode  Protective: willingness to seek help  Modifiable factors to be addressed during stay  Overall risk of suicide/self-harm and violence/aggression is MODERATE Static rf: past suicide attempts; past psychotic/mood episodes; past violence; substance abuse/dependence; hx of trauma  Modifiable rf on admission: unemployment, medication non-adherence, current psychotic/mood episode  Protective factors: willingness to seek help, medciation compliance prior to admission  Modifiable factors to be addressed during stay  Overall risk of suicide/self-harm and violence/aggression is MODERATE

## 2019-11-01 NOTE — PROGRESS NOTE BEHAVIORAL HEALTH - SUMMARY
50 y/o  M, domiciled at a shelter, unemployed, PPhx of paranoid disorder and ASPD (self-reported hx of PTSD), presented to the ED for worsening paranoida and suicidal ideation. On admission he had an episode of physical aggression requiring stat IM medications. On intial assessment, pt's mental status was notable for superficial cooperation, depressed mood, anxiety, constricted affect, paranoid ideation, poverty of thought and illogical thought process. Pt started on buprenorphine/naloxone, buspirone, clonazepam, olanzapine, and trazodone. He was intermittently non-adherent with medications, but consistently refused Olanzapine due to concerns about metabolic side effects. He was switched over to Quetiapine which he has been accepting; will be titrated upward. On current assessment he is calm, superficially cooperative, guarded, organized, and goal-directed, but oddly related and still paranoid/illogical and guarded about circumstances around admission, continues to deny having been aggressive on the unit. Pt submitted a 72 hour letter and is planned for court on 11/1/19 for retention hearing.Patient went to court today and would be discharged by next friday as per court order, Patient endorses for safety in the community and is denying any suicidal//homicidal ideation/impulse/plans or any paranoid ideation/perceptual disturbance.

## 2019-11-02 RX ADMIN — Medication 1 PATCH: at 21:48

## 2019-11-02 RX ADMIN — BUPRENORPHINE AND NALOXONE 1 TABLET(S): 2; .5 TABLET SUBLINGUAL at 07:15

## 2019-11-02 RX ADMIN — BUPRENORPHINE AND NALOXONE 1 TABLET(S): 2; .5 TABLET SUBLINGUAL at 21:47

## 2019-11-02 RX ADMIN — Medication 50 MILLIGRAM(S): at 21:47

## 2019-11-02 RX ADMIN — BUPRENORPHINE AND NALOXONE 1 TABLET(S): 2; .5 TABLET SUBLINGUAL at 13:25

## 2019-11-02 RX ADMIN — Medication 2 MILLIGRAM(S): at 10:03

## 2019-11-02 RX ADMIN — Medication 1 PATCH: at 10:03

## 2019-11-02 RX ADMIN — Medication 2 MILLIGRAM(S): at 17:31

## 2019-11-03 RX ADMIN — BUPRENORPHINE AND NALOXONE 1 TABLET(S): 2; .5 TABLET SUBLINGUAL at 07:08

## 2019-11-03 RX ADMIN — QUETIAPINE FUMARATE 50 MILLIGRAM(S): 200 TABLET, FILM COATED ORAL at 09:31

## 2019-11-03 RX ADMIN — Medication 2 MILLIGRAM(S): at 09:32

## 2019-11-03 RX ADMIN — BUPRENORPHINE AND NALOXONE 1 TABLET(S): 2; .5 TABLET SUBLINGUAL at 14:18

## 2019-11-03 RX ADMIN — Medication 2 MILLIGRAM(S): at 14:18

## 2019-11-03 RX ADMIN — Medication 1 PATCH: at 09:32

## 2019-11-03 RX ADMIN — Medication 1 PATCH: at 21:26

## 2019-11-03 RX ADMIN — Medication 50 MILLIGRAM(S): at 21:24

## 2019-11-03 RX ADMIN — QUETIAPINE FUMARATE 200 MILLIGRAM(S): 200 TABLET, FILM COATED ORAL at 21:24

## 2019-11-03 RX ADMIN — Medication 1 PATCH: at 07:09

## 2019-11-03 RX ADMIN — BUPRENORPHINE AND NALOXONE 1 TABLET(S): 2; .5 TABLET SUBLINGUAL at 21:24

## 2019-11-04 PROCEDURE — 99231 SBSQ HOSP IP/OBS SF/LOW 25: CPT

## 2019-11-04 RX ORDER — CLONAZEPAM 1 MG
2 TABLET ORAL
Refills: 0 | Status: DISCONTINUED | OUTPATIENT
Start: 2019-11-04 | End: 2019-11-05

## 2019-11-04 RX ORDER — BUPRENORPHINE AND NALOXONE 2; .5 MG/1; MG/1
1 TABLET SUBLINGUAL
Refills: 0 | Status: DISCONTINUED | OUTPATIENT
Start: 2019-11-04 | End: 2019-11-05

## 2019-11-04 RX ADMIN — Medication 1 PATCH: at 19:22

## 2019-11-04 RX ADMIN — Medication 2 MILLIGRAM(S): at 13:16

## 2019-11-04 RX ADMIN — Medication 1 PATCH: at 09:51

## 2019-11-04 RX ADMIN — BUPRENORPHINE AND NALOXONE 1 TABLET(S): 2; .5 TABLET SUBLINGUAL at 13:17

## 2019-11-04 RX ADMIN — QUETIAPINE FUMARATE 200 MILLIGRAM(S): 200 TABLET, FILM COATED ORAL at 22:07

## 2019-11-04 RX ADMIN — BUPRENORPHINE AND NALOXONE 1 TABLET(S): 2; .5 TABLET SUBLINGUAL at 07:50

## 2019-11-04 RX ADMIN — Medication 50 MILLIGRAM(S): at 22:07

## 2019-11-04 RX ADMIN — Medication 2 MILLIGRAM(S): at 09:51

## 2019-11-04 RX ADMIN — Medication 1 PATCH: at 09:00

## 2019-11-04 RX ADMIN — BUPRENORPHINE AND NALOXONE 1 TABLET(S): 2; .5 TABLET SUBLINGUAL at 19:31

## 2019-11-04 RX ADMIN — Medication 1 PATCH: at 07:01

## 2019-11-04 NOTE — PROGRESS NOTE BEHAVIORAL HEALTH - NSBHCHARTREVIEWVS_PSY_A_CORE FT
Vital Signs Last 24 Hrs  T(C): 37.2 (04 Nov 2019 09:00), Max: 37.2 (04 Nov 2019 09:00)  T(F): 99 (04 Nov 2019 09:00), Max: 99 (04 Nov 2019 09:00)  HR: 61 (04 Nov 2019 09:00) (61 - 64)  BP: 123/84 (04 Nov 2019 09:00) (123/84 - 125/74)  BP(mean): --  RR: 18 (04 Nov 2019 09:00) (17 - 18)  SpO2: 98% (04 Nov 2019 09:00) (95% - 98%)

## 2019-11-04 NOTE — PROGRESS NOTE BEHAVIORAL HEALTH - SUMMARY
50 y/o  M, domiciled at a shelter, unemployed, PPhx of paranoid disorder and ASPD (self-reported hx of PTSD), presented to the ED for worsening paranoida and suicidal ideation. On admission he had an episode of physical aggression requiring stat IM medications. On intial assessment, pt's mental status was notable for superficial cooperation, depressed mood, anxiety, constricted affect, paranoid ideation, poverty of thought and illogical thought process. Pt started on buprenorphine/naloxone, buspirone, clonazepam, olanzapine, and trazodone. He was intermittently non-adherent with medications, but consistently refused Olanzapine due to concerns about metabolic side effects. He was switched over to Quetiapine which he was initially accepting and was titrated upward, but has been refusing over the weekend due to concerns over being too sedated. On current assessment patient exhibits irritable edge, is superficially cooperative, guarded, organized, and goal-directed, but oddly related and still paranoid/illogical and guarded about circumstances around admission, continues to deny having been aggressive on the unit. Pt submitted a 72 hour letter and had court hearing 11/1/19 and will be discharged by next Friday as per court order, Patient endorses for safety in the community and is denying any suicidal//homicidal ideation/impulse/plans or any paranoid ideation/perceptual disturbance. Discharge planning for tomorrow.

## 2019-11-04 NOTE — PROGRESS NOTE BEHAVIORAL HEALTH - NSBHFUPINTERVALHXFT_PSY_A_CORE
Staff reports patient seen pacing around the unit, visible, social with select peers, and was awake all of Saturday night.   Patient found dressed in his own clothing, ambulating on the unit. Patient reports feeling "alright" and denies SI/HI/AVH. Patient has been refusing AM dosing of seroquel due to the medication making him drowsy, but nursing staff noted patient to be refusing PM dose as well. He has been consistently taking nicotine patch, trazodone, suboxone, and klonopin. Patient continues to deny ever having been agitated on the unit or symptoms of paranoia. Patient complains of staff on the unit are "lying and twisting my words". Patient expressed desire to leave unit as he states he has appointments with his psychiatrist and his suboxone clinic.

## 2019-11-04 NOTE — PROGRESS NOTE BEHAVIORAL HEALTH - RISK ASSESSMENT
Static rf: past suicide attempts; past psychotic/mood episodes; past violence; substance abuse/dependence; hx of trauma  Modifiable rf on admission: unemployment, medication non-adherence, current psychotic/mood episode  Protective factors: willingness to seek help, medciation compliance prior to admission  Modifiable factors to be addressed during stay  Overall risk of suicide/self-harm and violence/aggression is MODERATE

## 2019-11-05 VITALS
HEART RATE: 64 BPM | RESPIRATION RATE: 20 BRPM | OXYGEN SATURATION: 96 % | DIASTOLIC BLOOD PRESSURE: 83 MMHG | TEMPERATURE: 99 F | SYSTOLIC BLOOD PRESSURE: 123 MMHG

## 2019-11-05 PROCEDURE — 99238 HOSP IP/OBS DSCHRG MGMT 30/<: CPT

## 2019-11-05 PROCEDURE — 80048 BASIC METABOLIC PNL TOTAL CA: CPT

## 2019-11-05 PROCEDURE — 81003 URINALYSIS AUTO W/O SCOPE: CPT

## 2019-11-05 PROCEDURE — 93005 ELECTROCARDIOGRAM TRACING: CPT

## 2019-11-05 PROCEDURE — 36415 COLL VENOUS BLD VENIPUNCTURE: CPT

## 2019-11-05 PROCEDURE — 99285 EMERGENCY DEPT VISIT HI MDM: CPT | Mod: 25

## 2019-11-05 PROCEDURE — 83036 HEMOGLOBIN GLYCOSYLATED A1C: CPT

## 2019-11-05 PROCEDURE — 85025 COMPLETE CBC W/AUTO DIFF WBC: CPT

## 2019-11-05 PROCEDURE — 80061 LIPID PANEL: CPT

## 2019-11-05 PROCEDURE — 80307 DRUG TEST PRSMV CHEM ANLYZR: CPT

## 2019-11-05 RX ORDER — NICOTINE POLACRILEX 2 MG
1 GUM BUCCAL
Qty: 15 | Refills: 0
Start: 2019-11-05 | End: 2019-11-19

## 2019-11-05 RX ORDER — QUETIAPINE FUMARATE 200 MG/1
1 TABLET, FILM COATED ORAL
Qty: 15 | Refills: 0
Start: 2019-11-05 | End: 2019-11-19

## 2019-11-05 RX ORDER — TRAZODONE HCL 50 MG
1 TABLET ORAL
Qty: 7 | Refills: 0
Start: 2019-11-05 | End: 2019-11-11

## 2019-11-05 RX ADMIN — Medication 1 PATCH: at 07:53

## 2019-11-05 RX ADMIN — Medication 2 MILLIGRAM(S): at 09:56

## 2019-11-05 RX ADMIN — BUPRENORPHINE AND NALOXONE 1 TABLET(S): 2; .5 TABLET SUBLINGUAL at 07:52

## 2019-11-05 NOTE — PROGRESS NOTE BEHAVIORAL HEALTH - NSBHFUPINTERVALHXFT_PSY_A_CORE
Staff reports patient has been seen on the unit, appears irritable, and is dismissive. Patient found dressed in his own clothing in his room. Patient remains guarded and has an irritable edge. Patient reports stable mood/sleep/appetite. Patient states he is planning to see his psychiatrist, whose name he would not disclose, on [address] Patient is discharge focused and states he is ready to leave the unit. Denies SI/HI/AVH.  Patient has taken clonazepam, nicotine patch, trazodone, Nighttime quetiapine, and suboxone, and refusing buspar, and AM quetiapine. Staff reports patient has been seen on the unit, appears irritable, and is dismissive. Patient found dressed in his own clothing in his room. Patient remains guarded and has an irritable edge, but is superficially cooperative. Patient reports stable mood/sleep/appetite. Patient states he is planning to see his psychiatrist, whose name he would not disclose, on [address] Patient is discharge focused and states he is ready to leave the unit. Denies SI/HI/AVH/paranoid ideation.  Patient has taken clonazepam, nicotine patch, trazodone, Nighttime quetiapine, and suboxone, and refusing buspar, and AM quetiapine.

## 2019-11-05 NOTE — PROGRESS NOTE BEHAVIORAL HEALTH - NSBHADMITMEDEDUDETAILS_A_CORE FT
Discussed risk/benefits/JOSE C

## 2019-11-05 NOTE — PROGRESS NOTE BEHAVIORAL HEALTH - CASE SUMMARY
48yo  Male, currently domiciled in [address], under the care of Pamela Snider psychiatric NP, known to [facility] from most recent admission Dec '18- Mar '19, reporting paranoid thoughts so severe in nature they are causing him such significant distress he currently feels the only way to end his frustration is to jump off a bridge. During evaluation by this MD, pt constantly pauses in his replies to eyeball those around him, and makes mention of numerous paranoid thoughts, including, "I've been being followed," "every time I go to the bathroom I'm being recorded," "my conversations ae being recorded," "I spoke to Internal Affairs and they're in on it," as well as at one point accusing writer of being an officer, "you're a  aren't you." Pt. at times became loud when frustrated, tearful when reporting his distress, and would constantly pause to look about the room, in particular when anyone around him was on their cell phone. Pt. denies current active SI, but alludes to suicide as being the only possible way to end his torment. Pt. also reported frustration because "I'm doing everything right!" He reports that he has been compliant with medications, working out regularly, keeping up with his PO appointments, seeking to obtain his GED, and not abusing his medications or drugs. Writer had difficulty obtaining further details of pt's history as probing questions would quickly trigger pt's paranoia and eventually lead to pt accusing writer of being an officer; evaluation was terminated so as to preserve rapport and help pt to maintain his composure. Pt. denied HI/AH/VH. ; ;Oncall psychiatrist spoke with pt's sister last night, who "was greatly relieved pt was in the Emergency Room. She reported the pt has been texting her all day pictures of people he suspects of following him, and she reports "he's having an episode," "meds are not working," "he might get hurt," "I'm afraid," and finally "I'm so glad he... durng the discussion; reviewed with [facility]  patient's request;  patient is well dressed; has paranoid thinking but no bizarre thinking;  suspicious of staff but also a bit sarcastic and with a little humor at times; good eye contact; no s/h i/i/p or avh; no tremor; normal gait; better range of affect than on admission; affect mostly thought congruent.  Court mandates discharge by 11/8; however in view of stability despite paranoid thinking but no bizarre ideation will d/c in am; patient told MSW he plans to return to his previous therapist but will not allow contact with this person.  Left message approx [phone]  with VAUGHN Johnson  [phone] in presence of Dr. Olson (permitted by patient ); [facility] ([place]annie)   and patient.  Date of admission; date of discharge (11/5) ; reason for admission and general progress and intent to continue care as outpatient with original provider.  Patient requests discharge early tomorrow stating that he wants to visit his parole offficer.
Per staff, pt is drowsy, irritable, paranoid, focused on meds,, states to staff that he is worried that his PO is going to take him to long-term, that the nypd is persecuting him, is noted to be watchful and hypervigilant.    Pt was taken to court today for release hearing.   ordered release by next friday.  Pt expressed that he does not want to take zyprexa bc of worries about weight gain.  he denies feeling suspicious but is wary given his situation/past.  he wants to be discharged so that he can follow up at ssi, etc appointments. encourage zyprexa given its efficacy in the past.  not interested in it at this time.  no known med side effects.  cont tx as above.  igm tx.  stabilize and dc planning for next week.
48yo  Male, currently domiciled in [address], under the care of Pamela Snider psychiatric NP, known to [facility] from most recent admission Dec '18- Mar '19, reporting paranoid thoughts so severe in nature they are causing him such significant distress he currently feels the only way to end his frustration is to jump off a bridge. During evaluation by this MD, pt constantly pauses in his replies to eyeball those around him, and makes mention of numerous paranoid thoughts, including, "I've been being followed," "every time I go to the bathroom I'm being recorded," "my conversations ae being recorded," "I spoke to Internal Affairs and they're in on it," as well as at one point accusing writer of being an officer, "you're a  aren't you." Pt. at times became loud when frustrated, tearful when reporting his distress, and would constantly pause to look about the room, in particular when anyone around him was on their cell phone. Pt. denies current active SI, but alludes to suicide as being the only possible way to end his torment. Pt. also reported frustration because "I'm doing everything right!" He reports that he has been compliant with medications, working out regularly, keeping up with his PO appointments, seeking to obtain his GED, and not abusing his medications or drugs. Writer had difficulty obtaining further details of pt's history as probing questions would quickly trigger pt's paranoia and eventually lead to pt accusing writer of being an officer; evaluation was terminated so as to preserve rapport and help pt to maintain his composure. Pt. denied HI/AH/VH. ; ;Oncall psychiatrist spoke with pt's sister last night, who "was greatly relieved pt was in the Emergency Room. She reported the pt has been texting her all day pictures of people he suspects of following him, and she reports "he's having an episode," "meds are not working," "he might get hurt," "I'm afraid," and finally "I'm so glad he...rson.  Left message approx [phone]  with PBRIE Johnson  [phone] in presence of Dr. Olson (permitted by patient ); [facility] ([place])   and patient.  Date of admission; date of discharge (11/5) ; reason for admission and general progress and intent to continue care as outpatient with original provider.  Patient requests discharge early tomorrow stating that he wants to visit his parole offficer.   ;;11/5:; initially knocked on writer's door asking why he could not have his Adderall back but later accepting that all but Adderall would be returned to him.  Because the patient had all his medications (except Trazodone) already prescribed by past treater and patient not interested in any prescriptions patient was discharged with some suspiciousness but no SI or HI or evidence for AVH or bizarre thinking.  Patient mostly made negative statements about his care but no strange thinking per se.  Patient plans to return to past treater but will not allow staff to contact this person.
48yo  Male, currently domiciled in [address], under the care of Pamela Snider psychiatric NP, known to [facility] from most recent admission Dec '18- Mar '19, reporting paranoid thoughts so severe in nature they are causing him such significant distress he currently feels the only way to end his frustration is to jump off a bridge. During evaluation by this MD, pt constantly pauses in his replies to eyeball those around him, and makes mention of numerous paranoid thoughts, including, "I've been being followed," "every time I go to the bathroom I'm being recorded," "my conversations ae being recorded," "I spoke to Internal Affairs and they're in on it," as well as at one point accusing writer of being an officer, "you're a  aren't you." Pt. at times became loud when frustrated, tearful when reporting his distress, and would constantly pause to look about the room, in particular when anyone around him was on their cell phone. Pt. denies current active SI, but alludes to suicide as being the only possible way to end his torment. Pt. also reported frustration because "I'm doing everything right!" He reports that he has been compliant with medications, working out regularly, keeping up with his PO appointments, seeking to obtain his GED, and not abusing his medications or drugs. Writer had difficulty obtaining further details of pt's history as probing questions would quickly trigger pt's paranoia and eventually lead to pt accusing writer of being an officer; evaluation was terminated so as to preserve rapport and help pt to maintain his composure. Pt. denied HI/AH/VH. ; ;Oncall psychiatrist spoke with pt's sister last night, who "was greatly relieved pt was in the Emergency Room. She reported the pt has been texting her all day pictures of people he suspects of following him, and she reports "he's having an episode," "meds are not working," "he might get hurt," "I'm afraid," and finally "I'm so glad he...nit; affect distinctly not appropriate to TC with some elements of elevated or mixed mood; fair adls;  continues guarded and supicous but not hostille to staff per se. would add Seroquel to regime (Patient willing to take 100mg which is unikely to be effective but could be helpful .  Patient only allowing Suboxine and Klonopin.  Refuses Zyprexa.  Plan for court hearing for retention and TOO in view of persistent paranoid sxs ; hearing on 11/1 after review with [facility] .  ;;10/30:; disputes dependence on Aderall or Klonopin and feels paranoid thinking is appropriate for neighborhood in which he lives; attemds groups; smiles but anxious; no paranoid statements to wirter but some team members feel the patient is continuing to have paranoid thinking; wants or will allow higher dose of Seroquel;  plans to contest retention on 11/1 court hearing; hearing for TOO to be completed.  Zyprexa likely more effective than Seroquel for paranoid thinking based on experience of last admission.
50yo  Male, currently domiciled in [address], under the care of Pamela Snider psychiatric NP, known to [facility] from most recent admission Dec '18- Mar '19, reporting paranoid thoughts so severe in nature they are causing him such significant distress he currently feels the only way to end his frustration is to jump off a bridge. During evaluation by this MD, pt constantly pauses in his replies to eyeball those around him, and makes mention of numerous paranoid thoughts, including, "I've been being followed," "every time I go to the bathroom I'm being recorded," "my conversations ae being recorded," "I spoke to Internal Affairs and they're in on it," as well as at one point accusing writer of being an officer, "you're a  aren't you." Pt. at times became loud when frustrated, tearful when reporting his distress, and would constantly pause to look about the room, in particular when anyone around him was on their cell phone. Pt. denies current active SI, but alludes to suicide as being the only possible way to end his torment. Pt. also reported frustration because "I'm doing everything right!" He reports that he has been compliant with medications, working out regularly, keeping up with his PO appointments, seeking to obtain his GED, and not abusing his medications or drugs. Writer had difficulty obtaining further details of pt's history as probing questions would quickly trigger pt's paranoia and eventually lead to pt accusing writer of being an officer; evaluation was terminated so as to preserve rapport and help pt to maintain his composure. Pt. denied HI/AH/VH. ; ;Oncall psychiatrist spoke with pt's sister last night, who "was greatly relieved pt was in the Emergency Room. She reported the pt has been texting her all day pictures of people he suspects of following him, and she reports "he's having an episode," "meds are not working," "he might get hurt," "I'm afraid," and finally "I'm so glad he...th psychosis;  patient had been recieving Seroquel; Adderall; Buspar; Klonopin from private NP. Patient also placed on Suboxone (for pain?);  recommend swtich to Zyprexa which stabilized the patient in the past (six month admission);  Adderall likely driving paranoia should not be used;  Buspar for anxiety is ok;  Seroquel at 150mg is not effective for mood and psychotic sxs for this patient.   ;;10/24:; denies to staff intent to harm others but witnessed lunging at security on 10/22.   However less labile; less irritable; on phone mostly complaining about staff and hospital but in control with no mention of police or staff working for police or other paranoid thinking.  Impression is of substance (Adderall) induced psychotc disorder.  Patient is refusing Seroquel but will accept Klonopin.  Patient palced a 3 day letter yesterday but need more time to observe progress and encourage use of SGA for mood and psychosis and attempt to educate regarding Adderall use (should be avoided).
48yo  Male, currently domiciled in [address], under the care of Pamela Snider psychiatric NP, known to [facility] from most recent admission Dec '18- Mar '19, reporting paranoid thoughts so severe in nature they are causing him such significant distress he currently feels the only way to end his frustration is to jump off a bridge. During evaluation by this MD, pt constantly pauses in his replies to eyeball those around him, and makes mention of numerous paranoid thoughts, including, "I've been being followed," "every time I go to the bathroom I'm being recorded," "my conversations ae being recorded," "I spoke to Internal Affairs and they're in on it," as well as at one point accusing writer of being an officer, "you're a  aren't you." Pt. at times became loud when frustrated, tearful when reporting his distress, and would constantly pause to look about the room, in particular when anyone around him was on their cell phone. Pt. denies current active SI, but alludes to suicide as being the only possible way to end his torment. Pt. also reported frustration because "I'm doing everything right!" He reports that he has been compliant with medications, working out regularly, keeping up with his PO appointments, seeking to obtain his GED, and not abusing his medications or drugs. Writer had difficulty obtaining further details of pt's history as probing questions would quickly trigger pt's paranoia and eventually lead to pt accusing writer of being an officer; evaluation was terminated so as to preserve rapport and help pt to maintain his composure. Pt. denied HI/AH/VH. ; ;Oncall psychiatrist spoke with pt's sister last night, who "was greatly relieved pt was in the Emergency Room. She reported the pt has been texting her all day pictures of people he suspects of following him, and she reports "he's having an episode," "meds are not working," "he might get hurt," "I'm afraid," and finally "I'm so glad he....  Patient is refusing Seroquel but will accept Klonopin.  Patient palced a 3 day letter yesterday but need more time to observe progress and encourage use of SGA for mood and psychosis and attempt to educate regarding Adderall use (should be avoided).   ;;10/25:; significantly calmer on Suboxone and Klonopin but refuses Olanzepine which improved patient during last admission.  [facility] reports paranoid thinking but guarded with the writer;"I have appointments"  tells staff that he will go to court to contest retetntion and possible treatment over objection.  Suggest prn Zyprexa if needed for severe agiation .  Monitor for paranoid ideation ; aggressiveness; and excitement.  ;;10/28:; walks briskly in the hallway; discounts any issue with Adderall;  "I don't abuse Klonopin";  tells staff he expects to win should there be a court hearing.  Patient continues to refuse Zyprexa;  in view of past success of Zyprexa will see TOO for Zyprexa; Seroquel might be a backup but less effective.
48yo  Male, currently domiciled in [address], under the care of Pamela Snider psychiatric NP, known to [facility] from most recent admission Dec '18- Mar '19, reporting paranoid thoughts so severe in nature they are causing him such significant distress he currently feels the only way to end his frustration is to jump off a bridge. During evaluation by this MD, pt constantly pauses in his replies to eyeball those around him, and makes mention of numerous paranoid thoughts, including, "I've been being followed," "every time I go to the bathroom I'm being recorded," "my conversations ae being recorded," "I spoke to Internal Affairs and they're in on it," as well as at one point accusing writer of being an officer, "you're a  aren't you." Pt. at times became loud when frustrated, tearful when reporting his distress, and would constantly pause to look about the room, in particular when anyone around him was on their cell phone. Pt. denies current active SI, but alludes to suicide as being the only possible way to end his torment. Pt. also reported frustration because "I'm doing everything right!" He reports that he has been compliant with medications, working out regularly, keeping up with his PO appointments, seeking to obtain his GED, and not abusing his medications or drugs. Writer had difficulty obtaining further details of pt's history as probing questions would quickly trigger pt's paranoia and eventually lead to pt accusing writer of being an officer; evaluation was terminated so as to preserve rapport and help pt to maintain his composure. Pt. denied HI/AH/VH. ; ;Oncall psychiatrist spoke with pt's sister last night, who "was greatly relieved pt was in the Emergency Room. She reported the pt has been texting her all day pictures of people he suspects of following him, and she reports "he's having an episode," "meds are not working," "he might get hurt," "I'm afraid," and finally "I'm so glad he...ggressiveness; and excitement.  ;;10/28:; walks briskly in the hallway; discounts any issue with Adderall;  "I don't abuse Klonopin";  tells staff he expects to win should there be a court hearing.  Patient continues to refuse Zyprexa;  in view of past success of Zyprexa will see TOO for Zyprexa; Seroquel might be a backup but less effective.   ;;10/29:; wants Seroquel; wants his shoes; makes remarks feeling he is being demeaned when his request is not honored immediately; "I am not violent";  paces about the unit; affect distinctly not appropriate to TC with some elements of elevated or mixed mood; fair adls;  continues guarded and supicous but not hostille to staff per se. would add Seroquel to regime (Patient willing to take 100mg which is unikely to be effective but could be helpful .  Patient only allowing Suboxine and Klonopin.  Refuses Zyprexa.  Plan for court hearing for retention and TOO in view of persistent paranoid sxs ; hearing on 11/1 after review with [facility] .
48yo  Male, currently domiciled in [address], under the care of Pamela Snider psychiatric NP, known to [facility] from most recent admission Dec '18- Mar '19, reporting paranoid thoughts so severe in nature they are causing him such significant distress he currently feels the only way to end his frustration is to jump off a bridge. During evaluation by this MD, pt constantly pauses in his replies to eyeball those around him, and makes mention of numerous paranoid thoughts, including, "I've been being followed," "every time I go to the bathroom I'm being recorded," "my conversations ae being recorded," "I spoke to Internal Affairs and they're in on it," as well as at one point accusing writer of being an officer, "you're a  aren't you." Pt. at times became loud when frustrated, tearful when reporting his distress, and would constantly pause to look about the room, in particular when anyone around him was on their cell phone. Pt. denies current active SI, but alludes to suicide as being the only possible way to end his torment. Pt. also reported frustration because "I'm doing everything right!" He reports that he has been compliant with medications, working out regularly, keeping up with his PO appointments, seeking to obtain his GED, and not abusing his medications or drugs. Writer had difficulty obtaining further details of pt's history as probing questions would quickly trigger pt's paranoia and eventually lead to pt accusing writer of being an officer; evaluation was terminated so as to preserve rapport and help pt to maintain his composure. Pt. denied HI/AH/VH. ; ;Oncall psychiatrist spoke with pt's sister last night, who "was greatly relieved pt was in the Emergency Room. She reported the pt has been texting her all day pictures of people he suspects of following him, and she reports "he's having an episode," "meds are not working," "he might get hurt," "I'm afraid," and finally "I'm so glad he...ich he lives; attemds groups; smiles but anxious; no paranoid statements to wirter but some team members feel the patient is continuing to have paranoid thinking; wants or will allow higher dose of Seroquel;  plans to contest retention on 11/1 court hearing; hearing for TOO to be completed.  Zyprexa likely more effective than Seroquel for paranoid thinking based on experience of last admission.  ;;10/31:; wants the writer to speak to his  but does not trust other staff to do so.  Would not disclose what information can  be communicated; anxious; suspicious but not hostile; irritable at times; no mention of s/h i/i/p or avh;  other staff states that the patient makes peculiar remarks e.g. that in the last admission he wanted to be the "[place] on the [place]";  etc.  Patient willing to take Seroquel Suboxone and Klonopin; refuses Buspar; not receptive to Zyprexa that was effective for paranoid thinking last admission.  Plans to contest retention in court tomorrow.
50yo  Male, currently domiciled in [address], under the care of Pamela Snider psychiatric NP, known to [facility] from most recent admission Dec '18- Mar '19, reporting paranoid thoughts so severe in nature they are causing him such significant distress he currently feels the only way to end his frustration is to jump off a bridge. During evaluation by this MD, pt constantly pauses in his replies to eyeball those around him, and makes mention of numerous paranoid thoughts, including, "I've been being followed," "every time I go to the bathroom I'm being recorded," "my conversations ae being recorded," "I spoke to Internal Affairs and they're in on it," as well as at one point accusing writer of being an officer, "you're a  aren't you." Pt. at times became loud when frustrated, tearful when reporting his distress, and would constantly pause to look about the room, in particular when anyone around him was on their cell phone. Pt. denies current active SI, but alludes to suicide as being the only possible way to end his torment. Pt. also reported frustration because "I'm doing everything right!" He reports that he has been compliant with medications, working out regularly, keeping up with his PO appointments, seeking to obtain his GED, and not abusing his medications or drugs. Writer had difficulty obtaining further details of pt's history as probing questions would quickly trigger pt's paranoia and eventually lead to pt accusing writer of being an officer; evaluation was terminated so as to preserve rapport and help pt to maintain his composure. Pt. denied HI/AH/VH. ; ;Oncall psychiatrist spoke with pt's sister last night, who "was greatly relieved pt was in the Emergency Room. She reported the pt has been texting her all day pictures of people he suspects of following him, and she reports "he's having an episode," "meds are not working," "he might get hurt," "I'm afraid," and finally "I'm so glad he...ent to harm others but witnessed lunging at security on 10/22.   However less labile; less irritable; on phone mostly complaining about staff and hospital but in control with no mention of police or staff working for police or other paranoid thinking.  Impression is of substance (Adderall) induced psychotc disorder.  Patient is refusing Seroquel but will accept Klonopin.  Patient palced a 3 day letter yesterday but need more time to observe progress and encourage use of SGA for mood and psychosis and attempt to educate regarding Adderall use (should be avoided).   ;;10/25:; significantly calmer on Suboxone and Klonopin but refuses Olanzepine which improved patient during last admission.  St aff reports paranoid thinking but guarded with the writer;"I have appointments"  tells staff that he will go to court to contest retetntion and possible treatment over objection.  Suggest prn Zyprexa if needed for severe agiation .  Monitor for paranoid ideation ; aggressiveness; and excitement.

## 2019-11-05 NOTE — PROGRESS NOTE BEHAVIORAL HEALTH - RISK ASSESSMENT
Static rf: past suicide attempts; past psychotic/mood episodes; past violence; substance abuse/dependence; hx of trauma  Modifiable rf on admission: unemployment, medication non-adherence, current psychotic/mood episode  Protective factors: willingness to seek help, medication compliance prior to admission  Modifiable factors to be addressed during stay  Overall risk of suicide/self-harm and violence/aggression is MODERATE Static rf: past suicide attempts; past psychotic/mood episodes; past violence; substance abuse/dependence; hx of trauma  Modifiable rf on admission: unemployment, medication non-adherence, current psychotic/mood episode  Protective factors: willingness to seek help, medication compliance prior to admission  Modifiable factors to be addressed during stay  Overall risk of suicide/self-harm and violence/aggression is MODERATE given reduction of paranoid thinking and days with statements of future orientation.

## 2019-11-05 NOTE — PROGRESS NOTE BEHAVIORAL HEALTH - NSBHFUPINTERVALCCFT_PSY_A_CORE
"I'm good"
"I'm ok."
"I'm ok."
"I'm alright"
"I'm alright"
"I'm doing much better"
"I'm feeling great"
"I'm better"
"I'm ok. I'm not hallucinating"
"I'm good"
I'm "trying to get out of here"

## 2019-11-05 NOTE — PROGRESS NOTE BEHAVIORAL HEALTH - NSBHADMITIPREASON_PSY_A_CORE
Danger to self; mental illness expected to respond to inpatient care
other reason
Danger to self; mental illness expected to respond to inpatient care
Danger to self; mental illness expected to respond to inpatient care

## 2019-11-05 NOTE — PROGRESS NOTE BEHAVIORAL HEALTH - PRIMARY DX
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

## 2019-11-05 NOTE — PROGRESS NOTE BEHAVIORAL HEALTH - NSBHATTESTSEENBY_PSY_A_CORE
Attending Psychiatrist supervising NP/Trainee, meeting pt...
Trainee with telephonic supervision from Attending Psychiatrist
Trainee with telephonic supervision from Attending Psychiatrist
Attending Psychiatrist supervising NP/Trainee, meeting pt...

## 2019-11-05 NOTE — PROGRESS NOTE BEHAVIORAL HEALTH - NSBHCHARTREVIEWINVESTIGATE_PSY_A_CORE FT
10/21/2019  Ventricular Rate 65 BPM    Atrial Rate 65 BPM    P-R Interval 166 ms    QRS Duration 88 ms    Q-T Interval 382 ms    QTC Calculation(Bezet) 397 ms    P Axis 51 degrees    R Axis 37 degrees    T Axis 48 degrees    Diagnosis Line Normal sinus rhythm  RSR' in V1      Confirmed by JALIL YANG NEIL ([id number]) on 10/22/2019 9:59:14 AM

## 2019-11-05 NOTE — PROGRESS NOTE BEHAVIORAL HEALTH - MODIFICATIONS
see below
see below; patient observed by staff to be paranoid; may need TOO for SGA Olanzepine ;

## 2019-11-05 NOTE — PROGRESS NOTE BEHAVIORAL HEALTH - NSBHCHARTREVIEWLAB_PSY_A_CORE FT
10/21/2019:  Urine Tox: +benzodiazepine, +THC, + Amphetamine    10/23/2019:  Chol 120, , HDL 50, LDL 49  A1c 5.6

## 2019-11-05 NOTE — PROGRESS NOTE BEHAVIORAL HEALTH - AXIS III
Hepatitis C resolved according to previous records

## 2019-11-05 NOTE — PROGRESS NOTE BEHAVIORAL HEALTH - NSBHCHARTREVIEWVS_PSY_A_CORE FT
Vital Signs Last 24 Hrs  T(C): 37.1 (04 Nov 2019 16:00), Max: 37.2 (04 Nov 2019 09:00)  T(F): 98.7 (04 Nov 2019 16:00), Max: 99 (04 Nov 2019 09:00)  HR: 59 (04 Nov 2019 16:00) (59 - 61)  BP: 120/80 (04 Nov 2019 16:00) (120/80 - 123/84)  BP(mean): --  RR: 18 (04 Nov 2019 16:00) (18 - 18)  SpO2: 96% (04 Nov 2019 16:00) (96% - 98%) Vital Signs Last 24 Hrs  T(C): 37.1 (05 Nov 2019 10:00), Max: 37.1 (04 Nov 2019 16:00)  T(F): 98.7 (05 Nov 2019 10:00), Max: 98.7 (04 Nov 2019 16:00)  HR: 64 (05 Nov 2019 10:00) (59 - 64)  BP: 123/83 (05 Nov 2019 10:00) (120/80 - 123/83)  BP(mean): --  RR: 20 (05 Nov 2019 10:00) (18 - 20)  SpO2: 96% (05 Nov 2019 10:00) (96% - 96%)

## 2019-11-05 NOTE — PROGRESS NOTE BEHAVIORAL HEALTH - NSBHADMITIPOBSFT_PSY_A_CORE
N/A
Make needs known to staff

## 2019-11-05 NOTE — PROGRESS NOTE BEHAVIORAL HEALTH - PROBLEM SELECTOR PROBLEM 2
Healthcare maintenance

## 2019-11-05 NOTE — PROGRESS NOTE BEHAVIORAL HEALTH - SUMMARY
50 y/o  M, domiciled at a shelter, unemployed, PPhx of paranoid disorder and ASPD (self-reported hx of PTSD), presented to the ED for worsening paranoia and suicidal ideation. On admission he had an episode of physical aggression requiring stat IM medications. On initial assessment, pt's mental status was notable for superficial cooperation, depressed mood, anxiety, constricted affect, paranoid ideation, poverty of thought and illogical thought process. Pt started on buprenorphine/naloxone, buspirone, clonazepam, olanzapine, and trazodone. He was intermittently non-adherent with medications, but consistently refused Olanzapine due to concerns about metabolic side effects. He was switched over to Quetiapine which he was initially accepting and was titrated upward, but has been refusing the morning dose over the weekend due to concerns over being too sedated. Patient has taken clonazepam, trazodone, PM quetiapine, and suboxone, and refusing buspar, and AM quetiapine. On current assessment patient exhibits irritable edge, is superficially cooperative, guarded, organized, and goal-directed, but oddly related and still paranoid/illogical and guarded about circumstances around admission, continues to deny having been aggressive on the unit. Pt submitted a 72 hour letter and had court hearing 11/1/19 and will be discharged today as per court order. Patient endorses for safety in the community and is denying any suicidal/homicidal ideation/impulse/plans or any paranoid ideation/perceptual disturbance.

## 2019-11-05 NOTE — PROGRESS NOTE BEHAVIORAL HEALTH - PROBLEM SELECTOR PLAN 2
Nicotine Patch 7mg daily

## 2019-11-05 NOTE — PROGRESS NOTE BEHAVIORAL HEALTH - NSBHLEGALSTATUS_PSY_A_CORE
Planned for conversion to 2P/9.13 (Voluntary)
Planned for conversion to 2P/9.13 (Voluntary)
9.13 (Voluntary)/Planned for conversion to 2PC
9.13 (Voluntary)/Planned for conversion to 2PC
Planned for conversion to 2P/9.13 (Voluntary)
9.13 (Voluntary)/Planned for conversion to 2PC
Planned for conversion to 2P/9.13 (Voluntary)
Planned for conversion to 2P/9.13 (Voluntary)
9.13 (Voluntary)/Planned for conversion to 2PC

## 2019-11-07 DIAGNOSIS — Z91.5 PERSONAL HISTORY OF SELF-HARM: ICD-10-CM

## 2019-11-07 DIAGNOSIS — F15.988 OTHER STIMULANT USE, UNSPECIFIED WITH OTHER STIMULANT-INDUCED DISORDER: ICD-10-CM

## 2019-11-07 DIAGNOSIS — Z91.14 PATIENT'S OTHER NONCOMPLIANCE WITH MEDICATION REGIMEN: ICD-10-CM

## 2019-11-07 DIAGNOSIS — F41.9 ANXIETY DISORDER, UNSPECIFIED: ICD-10-CM

## 2019-11-07 DIAGNOSIS — F19.950 OTHER PSYCHOACTIVE SUBSTANCE USE, UNSPECIFIED WITH PSYCHOACTIVE SUBSTANCE-INDUCED PSYCHOTIC DISORDER WITH DELUSIONS: ICD-10-CM

## 2019-11-07 DIAGNOSIS — F39 UNSPECIFIED MOOD [AFFECTIVE] DISORDER: ICD-10-CM

## 2019-11-07 DIAGNOSIS — G47.00 INSOMNIA, UNSPECIFIED: ICD-10-CM

## 2019-11-07 DIAGNOSIS — R45.851 SUICIDAL IDEATIONS: ICD-10-CM

## 2019-11-07 DIAGNOSIS — F12.90 CANNABIS USE, UNSPECIFIED, UNCOMPLICATED: ICD-10-CM

## 2019-11-07 DIAGNOSIS — Z88.8 ALLERGY STATUS TO OTHER DRUGS, MEDICAMENTS AND BIOLOGICAL SUBSTANCES STATUS: ICD-10-CM

## 2019-11-07 DIAGNOSIS — Z86.19 PERSONAL HISTORY OF OTHER INFECTIOUS AND PARASITIC DISEASES: ICD-10-CM

## 2019-11-07 DIAGNOSIS — F17.210 NICOTINE DEPENDENCE, CIGARETTES, UNCOMPLICATED: ICD-10-CM

## 2019-12-20 ENCOUNTER — EMERGENCY (EMERGENCY)
Facility: HOSPITAL | Age: 49
LOS: 1 days | Discharge: HOME | End: 2019-12-20
Attending: EMERGENCY MEDICINE | Admitting: EMERGENCY MEDICINE
Payer: COMMERCIAL

## 2019-12-20 VITALS
HEART RATE: 84 BPM | TEMPERATURE: 98 F | DIASTOLIC BLOOD PRESSURE: 83 MMHG | OXYGEN SATURATION: 94 % | SYSTOLIC BLOOD PRESSURE: 130 MMHG | RESPIRATION RATE: 18 BRPM | WEIGHT: 203.93 LBS

## 2019-12-20 DIAGNOSIS — F22 DELUSIONAL DISORDERS: ICD-10-CM

## 2019-12-20 DIAGNOSIS — Z79.899 OTHER LONG TERM (CURRENT) DRUG THERAPY: ICD-10-CM

## 2019-12-20 DIAGNOSIS — Z88.8 ALLERGY STATUS TO OTHER DRUGS, MEDICAMENTS AND BIOLOGICAL SUBSTANCES STATUS: ICD-10-CM

## 2019-12-20 LAB
ALBUMIN SERPL ELPH-MCNC: 4.8 G/DL — SIGNIFICANT CHANGE UP (ref 3.3–5)
ALP SERPL-CCNC: 73 U/L — SIGNIFICANT CHANGE UP (ref 40–120)
ALT FLD-CCNC: 14 U/L — SIGNIFICANT CHANGE UP (ref 10–45)
ANION GAP SERPL CALC-SCNC: 10 MMOL/L — SIGNIFICANT CHANGE UP (ref 5–17)
APAP SERPL-MCNC: <5 UG/ML — LOW (ref 10–30)
APPEARANCE UR: CLEAR — SIGNIFICANT CHANGE UP
AST SERPL-CCNC: 22 U/L — SIGNIFICANT CHANGE UP (ref 10–40)
BASOPHILS # BLD AUTO: 0.06 K/UL — SIGNIFICANT CHANGE UP (ref 0–0.2)
BASOPHILS NFR BLD AUTO: 0.7 % — SIGNIFICANT CHANGE UP (ref 0–2)
BILIRUB SERPL-MCNC: 1 MG/DL — SIGNIFICANT CHANGE UP (ref 0.2–1.2)
BILIRUB UR-MCNC: NEGATIVE — SIGNIFICANT CHANGE UP
BUN SERPL-MCNC: 12 MG/DL — SIGNIFICANT CHANGE UP (ref 7–23)
CALCIUM SERPL-MCNC: 9.6 MG/DL — SIGNIFICANT CHANGE UP (ref 8.4–10.5)
CHLORIDE SERPL-SCNC: 100 MMOL/L — SIGNIFICANT CHANGE UP (ref 96–108)
CO2 SERPL-SCNC: 30 MMOL/L — SIGNIFICANT CHANGE UP (ref 22–31)
COLOR SPEC: YELLOW — SIGNIFICANT CHANGE UP
CREAT SERPL-MCNC: 0.89 MG/DL — SIGNIFICANT CHANGE UP (ref 0.5–1.3)
DIFF PNL FLD: NEGATIVE — SIGNIFICANT CHANGE UP
EOSINOPHIL # BLD AUTO: 0.09 K/UL — SIGNIFICANT CHANGE UP (ref 0–0.5)
EOSINOPHIL NFR BLD AUTO: 1 % — SIGNIFICANT CHANGE UP (ref 0–6)
ETHANOL SERPL-MCNC: <10 MG/DL — SIGNIFICANT CHANGE UP (ref 0–10)
GLUCOSE SERPL-MCNC: 106 MG/DL — HIGH (ref 70–99)
GLUCOSE UR QL: NEGATIVE — SIGNIFICANT CHANGE UP
HCT VFR BLD CALC: 41.8 % — SIGNIFICANT CHANGE UP (ref 39–50)
HGB BLD-MCNC: 14.1 G/DL — SIGNIFICANT CHANGE UP (ref 13–17)
IMM GRANULOCYTES NFR BLD AUTO: 0.2 % — SIGNIFICANT CHANGE UP (ref 0–1.5)
KETONES UR-MCNC: NEGATIVE — SIGNIFICANT CHANGE UP
LEUKOCYTE ESTERASE UR-ACNC: NEGATIVE — SIGNIFICANT CHANGE UP
LYMPHOCYTES # BLD AUTO: 2.14 K/UL — SIGNIFICANT CHANGE UP (ref 1–3.3)
LYMPHOCYTES # BLD AUTO: 23.5 % — SIGNIFICANT CHANGE UP (ref 13–44)
MCHC RBC-ENTMCNC: 27.2 PG — SIGNIFICANT CHANGE UP (ref 27–34)
MCHC RBC-ENTMCNC: 33.7 GM/DL — SIGNIFICANT CHANGE UP (ref 32–36)
MCV RBC AUTO: 80.7 FL — SIGNIFICANT CHANGE UP (ref 80–100)
MONOCYTES # BLD AUTO: 0.4 K/UL — SIGNIFICANT CHANGE UP (ref 0–0.9)
MONOCYTES NFR BLD AUTO: 4.4 % — SIGNIFICANT CHANGE UP (ref 2–14)
NEUTROPHILS # BLD AUTO: 6.4 K/UL — SIGNIFICANT CHANGE UP (ref 1.8–7.4)
NEUTROPHILS NFR BLD AUTO: 70.2 % — SIGNIFICANT CHANGE UP (ref 43–77)
NITRITE UR-MCNC: NEGATIVE — SIGNIFICANT CHANGE UP
NRBC # BLD: 0 /100 WBCS — SIGNIFICANT CHANGE UP (ref 0–0)
PCP SPEC-MCNC: SIGNIFICANT CHANGE UP
PH UR: 6.5 — SIGNIFICANT CHANGE UP (ref 5–8)
PLATELET # BLD AUTO: 292 K/UL — SIGNIFICANT CHANGE UP (ref 150–400)
POTASSIUM SERPL-MCNC: 4.6 MMOL/L — SIGNIFICANT CHANGE UP (ref 3.5–5.3)
POTASSIUM SERPL-SCNC: 4.6 MMOL/L — SIGNIFICANT CHANGE UP (ref 3.5–5.3)
PROT SERPL-MCNC: 8 G/DL — SIGNIFICANT CHANGE UP (ref 6–8.3)
PROT UR-MCNC: NEGATIVE MG/DL — SIGNIFICANT CHANGE UP
RBC # BLD: 5.18 M/UL — SIGNIFICANT CHANGE UP (ref 4.2–5.8)
RBC # FLD: 13.6 % — SIGNIFICANT CHANGE UP (ref 10.3–14.5)
SALICYLATES SERPL-MCNC: <0.3 MG/DL — LOW (ref 2.8–20)
SODIUM SERPL-SCNC: 140 MMOL/L — SIGNIFICANT CHANGE UP (ref 135–145)
SP GR SPEC: 1.02 — SIGNIFICANT CHANGE UP (ref 1–1.03)
UROBILINOGEN FLD QL: 0.2 E.U./DL — SIGNIFICANT CHANGE UP
WBC # BLD: 9.11 K/UL — SIGNIFICANT CHANGE UP (ref 3.8–10.5)
WBC # FLD AUTO: 9.11 K/UL — SIGNIFICANT CHANGE UP (ref 3.8–10.5)

## 2019-12-20 PROCEDURE — 80307 DRUG TEST PRSMV CHEM ANLYZR: CPT

## 2019-12-20 PROCEDURE — 85025 COMPLETE CBC W/AUTO DIFF WBC: CPT

## 2019-12-20 PROCEDURE — 99284 EMERGENCY DEPT VISIT MOD MDM: CPT

## 2019-12-20 PROCEDURE — 36415 COLL VENOUS BLD VENIPUNCTURE: CPT

## 2019-12-20 PROCEDURE — 80053 COMPREHEN METABOLIC PANEL: CPT

## 2019-12-20 PROCEDURE — 87086 URINE CULTURE/COLONY COUNT: CPT

## 2019-12-20 PROCEDURE — 81003 URINALYSIS AUTO W/O SCOPE: CPT

## 2019-12-20 PROCEDURE — 99285 EMERGENCY DEPT VISIT HI MDM: CPT | Mod: 25

## 2019-12-20 NOTE — ED ADULT TRIAGE NOTE - CHIEF COMPLAINT QUOTE
Patient states "people have been following me I feel threatened." He avoids eye contact during interview. She denies SI/HI/hearing voices. Patient denies alcohol or drug use. He states he takes his prescribed medication on daily basis-- Klonopin, Remeron, haldol, Mirtazapine, Adderall, Suboxone.

## 2019-12-20 NOTE — ED PROVIDER NOTE - OBJECTIVE STATEMENT
48 y/o M, domiciled at shelter, with PMHx of paranoid disorder presents to the ED for worsening paranoia and SI, recent discharge from [facility] for similar complaints. Patient believes police are following him. Has had chronic paranoid ideation x 2 years. Threatens that he will kill himself by jumping off a bridge if he is not admitted.

## 2019-12-20 NOTE — ED BEHAVIORAL HEALTH ASSESSMENT NOTE - DETAILS
Discharge from [facility] today Not available Threatens he would kill himself if not admitted. Dr. Wilson

## 2019-12-20 NOTE — ED BEHAVIORAL HEALTH ASSESSMENT NOTE - RISK ASSESSMENT
· Suicide Risk Factors	Alcohol/Substance abuse disorders, Psychotic disorder current/past    Activating Events/Stressors:  · Activating Events/Stressors	Pending incarceration or homelessness, Legal problems, Inadequate social supports  · Other	None    Suicide Protective Factors:  · Suicide Protective Factors	Identifies reasons for living, Has future plans, Fear of death or the actual act of killing self Low Acute Suicide Risk

## 2019-12-20 NOTE — ED PROVIDER NOTE - PHYSICAL EXAMINATION
CONSTITUTIONAL: Well-appearing; well-nourished; in no apparent distress.   HEAD: Normocephalic; atraumatic.   EYES: PERRL; EOM intact; conjunctiva and sclera clear  ENT: normal nose; no rhinorrhea; normal pharynx with no erythema or lesions.   NECK: Supple; non-tender; no LAD  CARDIOVASCULAR: Normal S1, S2; no murmurs, rubs, or gallops. Regular rate and rhythm.   RESPIRATORY: Breathing easily; breath sounds clear and equal bilaterally; no wheezes, rhonchi, or rales.  GI: Soft; non-distended; non-tender; no palpable organomegaly.   MSK: FROM at all extremities, normal tone   EXT: No cyanosis or edema; N/V intact  SKIN: Normal for age and race; warm; dry; good turgor; no apparent lesions or rash.   NEURO: A & O x 3; face symmetric; grossly unremarkable.   PSYCHOLOGICAL: Paranoid, no evidence of psychosis or active SI.

## 2019-12-20 NOTE — ED BEHAVIORAL HEALTH ASSESSMENT NOTE - HPI (INCLUDE ILLNESS QUALITY, SEVERITY, DURATION, TIMING, CONTEXT, MODIFYING FACTORS, ASSOCIATED SIGNS AND SYMPTOMS)
50 y/o  M, domiciled at a shelter, unemployed, PPhx of paranoid disorder and ASPD (self-reported hx of PTSD), presented to the ED for worsening paranoia and suicidal ideation. 50 y/o  M, domiciled at a shelter, unemployed, on parole, single, non-caregiver, with legal history, PPhx of paranoid disorder, antisocial P/D,  and ASPD (self-reported hx of PTSD), no prior SA, at least 4 psych admissions [facility] (12/9-3/22/2019, 10/21-11/ 6/2019; [facility] 11/23-today), presented to the ED for worsening paranoia and suicidal ideation right after being discharged from [facility].      On evaluation, he appears neat, nicely groomed but evasive and superficially cooperative.  Patient reports that he was discharged from [facility] to a shelter today.  When he arrived at the shelter, he felt that people there were intimidating him and he was paranoid that the police are following him.  He reports chronic paranoid ideation for 2 years with no changes in intensity and frequency.  His way of coping is to come to the hospital so that he is safe.  He also complains that he only receives $23 a month.  He denies depressed mood, hopelessness Si/HI, or auditory or visual hallucinations.  States that he has a outpatient provider and takes his medications.  When a list of shelter information offered, he states that he is on parole and he can't because it is passed curfew.  He was offered to stay over in the waiting area overnight and go to a shelter in the morning.  He threatens that  he will go out and kill himself if not admitted.      iStop checks: He was prescribeb following medications every month for the past 4 months including days when he was inpatient.    Reference #: [id number]   dextroamp-amphetamin 20 mg tab 90 for 30 days  clonazepam 2 mg tablet 90 for 30 days  Genesis Snider NP 48 y/o  M, domiciled at a shelter, unemployed, on parole, single, non-caregiver, with legal history, PPhx of paranoid disorder, antisocial P/D,  and ASPD (self-reported hx of PTSD), no prior SA, at least 4 psych admissions [facility] (12/9-3/22/2019, 10/21-11/ 6/2019; [facility] 11/23-today), presented to the ED for worsening paranoia and suicidal ideation right after being discharged from [facility].      On evaluation, he appears neat, nicely groomed but evasive and superficially cooperative.  Patient reports that he was discharged from [facility] to a shelter today.  When he arrived at the shelter, he felt that people there were intimidating him and he was paranoid that the police are following him.  He reports chronic paranoid ideation for 2 years with no changes in intensity and frequency.  His way of coping is to come to the hospital so that he is safe.  He also complains that he only receives $23 a month.  He denies depressed mood, hopelessness Si/HI, or auditory or visual hallucinations.  States that he has a outpatient provider and takes his medications.  When a list of shelter information offered, he states that he is on parole and he can't because it is passed curfew.  He was offered to stay over in the waiting area overnight and go to a shelter in the morning.  He threatens that  he will go out and kill himself by jumping off [place] bridge if not admitted.      iStop checks: He was prescribeb following medications every month for the past 4 months including days when he was inpatient.    Reference #: [id number]   dextroamp-amphetamin 20 mg tab 90 for 30 days  clonazepam 2 mg tablet 90 for 30 days  Genesis Snider NP

## 2019-12-20 NOTE — ED ADULT NURSE NOTE - OBJECTIVE STATEMENT
49 year old male patient presents to ED with paranoid thoughts of "multiple people keep following me, people are telling me i'm crazy but I have proof they are out to get me".  A+OX3, denies missing any of his psychiatric medications. 49 year old male patient presents to ED with paranoid thoughts of "multiple people keep following me, people are telling me i'm crazy but I have proof they are out to get me".  A+OX3, denies missing any of his psychiatric medications.  DEnies si/hi, denies auditory hallucinations.

## 2019-12-20 NOTE — ED BEHAVIORAL HEALTH ASSESSMENT NOTE - VIOLENCE RISK FACTORS:
History of violation of a legal mandate (e.g., parole, probation, AOT)/Substance abuse/Feeling of being under threat and being unable to control threat

## 2019-12-20 NOTE — ED PROVIDER NOTE - NSFOLLOWUPINSTRUCTIONS_ED_ALL_ED_FT
Schizophrenia  Schizophrenia is a mental illness. It may cause disturbed or disorganized thinking, speech, or behavior. People with schizophrenia have problems functioning in one or more areas of life. People with schizophrenia are at increased risk for suicide, certain long-term (chronic) physical illnesses, and unhealthy behaviors, such as smoking and drug use.  People who have family members with schizophrenia are at higher risk of developing the illness. Schizophrenia affects men and women equally, but it usually appears at an earlier age (teenage or early adult years) in men.  What are the causes?  The cause of this condition is not known.  What increases the risk?  The following factors may make you more likely to develop this condition:  Having a family member who has schizophrenia. Some gene combinations may increase the risk, but there is no single gene that causes schizophrenia.Impaired brain or neurotransmitter development or chemistry. Neurotransmitters are chemicals in the brain.What are the signs or symptoms?  The earliest symptoms are often subtle and may go unnoticed until the illness becomes more severe (first-break psychosis). Symptoms of schizophrenia may be ongoing (continuous) or may come and go in severity. Episodes are often triggered by major life events, such as:  Family stress.College. service.Marriage.Pregnancy or childbirth.Divorce.Loss of a loved one.Symptoms may include:  Seeing, hearing, or feeling things that do not exist (hallucinations).Having false beliefs (delusions). Delusions often involve beliefs that you are being attacked, harassed, cheated, persecuted, or conspired against (persecutory delusions).Speech that does not make sense to others or is hard to understand (incoherent).Behavior that is odd, confused, unfocused, withdrawn, or disorganized.Extremely overactive or underactive motor activity (catatonia). Motor activity is any action that involves the muscles.[place] or blunted emotions (flat affect).Loss of will power (avolition).Withdrawal from social contacts (social isolation).Symptoms may affect the level of functioning in one or more major areas of life, such as work, school, relationships, or self-care.  How is this diagnosed?  Schizophrenia is diagnosed through an assessment by a mental health care provider.  Your mental health care provider may ask questions about:  Your thoughts, behavior, and mood.Your ability to function in daily life.Your medical history.Any use of alcohol or drugs, including prescription medicines.You may have blood tests and imaging exams.How is this treated?  Schizophrenia is a chronic illness that is best controlled with continuous treatment rather than treatment only when symptoms occur. The following treatments are used to manage schizophrenia:  Medicine. This is the most effective and important form of treatment for schizophrenia. Antipsychotic medicines are usually prescribed to help manage schizophrenia. Other types of medicine may be added to relieve any symptoms that may occur despite the use of antipsychotic medicines.Counseling or talk therapy. Individual, group, or family counseling may be helpful in providing education, support, and guidance. Many people also benefit from social skills and job skills (vocational) training.A combination of medicine and counseling is best for managing the disorder over time. A procedure in which electricity is applied to the brain through the scalp (electroconvulsive therapy) may be used to treat catatonic schizophrenia or schizophrenia in people who cannot take medicine or do not respond to medicine and counseling.  Follow these instructions at home:     Keep stress under control. Stress may trigger psychosis and make symptoms worse.Try to get as much sleep as you can.Avoid alcohol and drugs. They can affect how medicine works and make symptoms worse.Surround yourself with people who care about you and can help you manage your condition.Take over-the-counter and prescription medicines only as told by your health care provider.Keep all follow-up visits as told by your health care provider and counselor. This is important.Contact a health care provider if:  You have a bad response to changes in medicines or to your treatment plan.You have trouble falling sleep.You have a low mood that will not go away.You are using:  Drugs.Too much caffeine.Tobacco products.Alcohol.Get help right away if:  You feel out of control.You or others notice warning signs of suicide such as:  Increased use of drugs or alcoholExpressing feelings of not having a purpose in life, being trapped, guilty, anxious and agitated, or hopeless.Withdrawing from friends and family.Showing uncontrolled anger, recklessness, and dramatic mood changes.Talking about suicide, discussing or searching for methods.If you ever feel like you may hurt yourself or others, or have thoughts about taking your own life, get help right away. You can go to your nearest emergency department or call:   Your local emergency services (911 in the [place] A suicide crisis helpline, such as the National Suicide Prevention Lifeline at 1-748.364.5025. This is open 24 hours a day. Summary  Schizophrenia is a mental illness that causes disturbed or disorganized thinking, speech, or behavior.Symptoms of schizophrenia may be ongoing or may come and go. They are often triggered by major life events.Keep stress under control. Stress may trigger psychosis and make symptoms worse.Avoid alcohol and drugs. They can affect how medicine works and make symptoms worse.Get help right away if you feel out of control.This information is not intended to replace advice given to you by your health care provider. Make sure you discuss any questions you have with your health care provider.

## 2019-12-20 NOTE — ED BEHAVIORAL HEALTH ASSESSMENT NOTE - SUMMARY
48 y/o  M, domiciled at a shelter, unemployed, on parole, single, non-caregiver, with legal history, PPhx of paranoid disorder, antisocial P/D,  and ASPD (self-reported hx of PTSD), no prior SA, at least 4 psych admissions [facility] (12/9-3/22/2019, 10/21-11/ 6/2019; [facility] 11/23-today), presented to the ED for worsening paranoia and suicidal ideation right after being discharged from [facility].      On evaluation, patient presents with chronic paranoid ideation for 2 years, no changes in intensity or frequency, he endorses suicidal ideation when being told that he is not admitted.  Given the history of legal issue, on parole, antisocial personality disorder, the context that he was discharge from a hospital to a shelter today, receiving prescription for significant doses of controlled substances, it is highly suspicious that patient request admission for secondary gain of place to stay and controlled substance.  He displays elevated chronic risk to self and others but no evident of imminent risk.

## 2019-12-20 NOTE — ED PROVIDER NOTE - CLINICAL SUMMARY MEDICAL DECISION MAKING FREE TEXT BOX
48 y/o M with paranoia and SI, will obtain medical clearance and psych consult. Upon discussion with psych, patient with secondary gain given recent discharge. Has chronic paranoid ideation and only endorses SI when told he is not being admitted, no imminent risk. Patient discharged back to shelter. 50 y/o M with paranoia and SI, will obtain medical clearance and psych consult. Upon discussion with psych, patient with secondary gain given recent discharge. Has chronic paranoid ideation and only endorses SI when told he is not being admitted. Disc w psych, patient discharged back to shelter.

## 2019-12-20 NOTE — ED BEHAVIORAL HEALTH ASSESSMENT NOTE - DIFFERENTIAL
1. Antisocial personality disorder  2. ? self-reported PTSD   3. OUD on suboxone 1. Antisocial personality disorder  2. Malingering  3. ? self-reported PTSD   4. OUD on suboxone

## 2019-12-20 NOTE — ED PROVIDER NOTE - PATIENT PORTAL LINK FT
You can access the FollowMyHealth Patient Portal offered by [facility] by registering at the following website: [email or web address]. By joining [facility]’s FollowMyHealth portal, you will also be able to view your health information using other applications (apps) compatible with our system.

## 2019-12-21 ENCOUNTER — EMERGENCY (EMERGENCY)
Facility: HOSPITAL | Age: 49
LOS: 1 days | Discharge: HOME | End: 2019-12-21
Attending: EMERGENCY MEDICINE | Admitting: EMERGENCY MEDICINE
Payer: COMMERCIAL

## 2019-12-21 VITALS
HEART RATE: 82 BPM | OXYGEN SATURATION: 99 % | DIASTOLIC BLOOD PRESSURE: 99 MMHG | HEIGHT: 67 IN | TEMPERATURE: 98 F | RESPIRATION RATE: 17 BRPM | SYSTOLIC BLOOD PRESSURE: 148 MMHG | WEIGHT: 198.42 LBS

## 2019-12-21 VITALS
RESPIRATION RATE: 18 BRPM | DIASTOLIC BLOOD PRESSURE: 80 MMHG | SYSTOLIC BLOOD PRESSURE: 142 MMHG | HEART RATE: 82 BPM | TEMPERATURE: 98 F | OXYGEN SATURATION: 97 %

## 2019-12-21 LAB
ALBUMIN SERPL ELPH-MCNC: 4.9 G/DL — SIGNIFICANT CHANGE UP (ref 3.3–5)
ALP SERPL-CCNC: 72 U/L — SIGNIFICANT CHANGE UP (ref 40–120)
ALT FLD-CCNC: 14 U/L — SIGNIFICANT CHANGE UP (ref 10–45)
ANION GAP SERPL CALC-SCNC: 12 MMOL/L — SIGNIFICANT CHANGE UP (ref 5–17)
APAP SERPL-MCNC: <5 UG/ML — LOW (ref 10–30)
AST SERPL-CCNC: 23 U/L — SIGNIFICANT CHANGE UP (ref 10–40)
BASOPHILS # BLD AUTO: 0.06 K/UL — SIGNIFICANT CHANGE UP (ref 0–0.2)
BASOPHILS NFR BLD AUTO: 0.8 % — SIGNIFICANT CHANGE UP (ref 0–2)
BILIRUB SERPL-MCNC: 1.4 MG/DL — HIGH (ref 0.2–1.2)
BUN SERPL-MCNC: 15 MG/DL — SIGNIFICANT CHANGE UP (ref 7–23)
CALCIUM SERPL-MCNC: 9.6 MG/DL — SIGNIFICANT CHANGE UP (ref 8.4–10.5)
CHLORIDE SERPL-SCNC: 100 MMOL/L — SIGNIFICANT CHANGE UP (ref 96–108)
CO2 SERPL-SCNC: 28 MMOL/L — SIGNIFICANT CHANGE UP (ref 22–31)
CREAT SERPL-MCNC: 0.91 MG/DL — SIGNIFICANT CHANGE UP (ref 0.5–1.3)
EOSINOPHIL # BLD AUTO: 0.16 K/UL — SIGNIFICANT CHANGE UP (ref 0–0.5)
EOSINOPHIL NFR BLD AUTO: 2.1 % — SIGNIFICANT CHANGE UP (ref 0–6)
ETHANOL SERPL-MCNC: <10 MG/DL — SIGNIFICANT CHANGE UP (ref 0–10)
GLUCOSE SERPL-MCNC: 95 MG/DL — SIGNIFICANT CHANGE UP (ref 70–99)
HCT VFR BLD CALC: 40.9 % — SIGNIFICANT CHANGE UP (ref 39–50)
HGB BLD-MCNC: 14 G/DL — SIGNIFICANT CHANGE UP (ref 13–17)
IMM GRANULOCYTES NFR BLD AUTO: 0.1 % — SIGNIFICANT CHANGE UP (ref 0–1.5)
LYMPHOCYTES # BLD AUTO: 1.99 K/UL — SIGNIFICANT CHANGE UP (ref 1–3.3)
LYMPHOCYTES # BLD AUTO: 25.5 % — SIGNIFICANT CHANGE UP (ref 13–44)
MCHC RBC-ENTMCNC: 27.4 PG — SIGNIFICANT CHANGE UP (ref 27–34)
MCHC RBC-ENTMCNC: 34.2 GM/DL — SIGNIFICANT CHANGE UP (ref 32–36)
MCV RBC AUTO: 80 FL — SIGNIFICANT CHANGE UP (ref 80–100)
MONOCYTES # BLD AUTO: 0.55 K/UL — SIGNIFICANT CHANGE UP (ref 0–0.9)
MONOCYTES NFR BLD AUTO: 7.1 % — SIGNIFICANT CHANGE UP (ref 2–14)
NEUTROPHILS # BLD AUTO: 5.02 K/UL — SIGNIFICANT CHANGE UP (ref 1.8–7.4)
NEUTROPHILS NFR BLD AUTO: 64.4 % — SIGNIFICANT CHANGE UP (ref 43–77)
NRBC # BLD: 0 /100 WBCS — SIGNIFICANT CHANGE UP (ref 0–0)
PLATELET # BLD AUTO: 309 K/UL — SIGNIFICANT CHANGE UP (ref 150–400)
POTASSIUM SERPL-MCNC: 3.9 MMOL/L — SIGNIFICANT CHANGE UP (ref 3.5–5.3)
POTASSIUM SERPL-SCNC: 3.9 MMOL/L — SIGNIFICANT CHANGE UP (ref 3.5–5.3)
PROT SERPL-MCNC: 7.9 G/DL — SIGNIFICANT CHANGE UP (ref 6–8.3)
RBC # BLD: 5.11 M/UL — SIGNIFICANT CHANGE UP (ref 4.2–5.8)
RBC # FLD: 13.6 % — SIGNIFICANT CHANGE UP (ref 10.3–14.5)
SALICYLATES SERPL-MCNC: <0.3 MG/DL — LOW (ref 2.8–20)
SODIUM SERPL-SCNC: 140 MMOL/L — SIGNIFICANT CHANGE UP (ref 135–145)
WBC # BLD: 7.79 K/UL — SIGNIFICANT CHANGE UP (ref 3.8–10.5)
WBC # FLD AUTO: 7.79 K/UL — SIGNIFICANT CHANGE UP (ref 3.8–10.5)

## 2019-12-21 PROCEDURE — 99285 EMERGENCY DEPT VISIT HI MDM: CPT

## 2019-12-21 PROCEDURE — 85025 COMPLETE CBC W/AUTO DIFF WBC: CPT

## 2019-12-21 PROCEDURE — 80307 DRUG TEST PRSMV CHEM ANLYZR: CPT

## 2019-12-21 PROCEDURE — 99285 EMERGENCY DEPT VISIT HI MDM: CPT | Mod: 25

## 2019-12-21 PROCEDURE — 93010 ELECTROCARDIOGRAM REPORT: CPT

## 2019-12-21 PROCEDURE — 36415 COLL VENOUS BLD VENIPUNCTURE: CPT

## 2019-12-21 PROCEDURE — 80053 COMPREHEN METABOLIC PANEL: CPT

## 2019-12-21 PROCEDURE — 93005 ELECTROCARDIOGRAM TRACING: CPT

## 2019-12-21 RX ORDER — SODIUM CHLORIDE 9 MG/ML
1000 INJECTION INTRAMUSCULAR; INTRAVENOUS; SUBCUTANEOUS ONCE
Refills: 0 | Status: COMPLETED | OUTPATIENT
Start: 2019-12-21 | End: 2019-12-21

## 2019-12-21 RX ADMIN — SODIUM CHLORIDE 1000 MILLILITER(S): 9 INJECTION INTRAMUSCULAR; INTRAVENOUS; SUBCUTANEOUS at 07:48

## 2019-12-21 RX ADMIN — SODIUM CHLORIDE 1000 MILLILITER(S): 9 INJECTION INTRAMUSCULAR; INTRAVENOUS; SUBCUTANEOUS at 07:49

## 2019-12-21 NOTE — ED PROVIDER NOTE - CLINICAL SUMMARY MEDICAL DECISION MAKING FREE TEXT BOX
Pt with depression stating he took 25 tabs of 0.5mg cogentin while waiting to be seen - complains of dizziness .  Full set of tox labs, ekg, monitor, 1:1, fluids started in ED. Pt with depression stating he took 25 tabs of 0.5mg cogentin while waiting to be seen - complains of dizziness .  Full set of tox labs, ekg, monitor, 1:1, fluids started in ED.  Pt without signs of overdosing toxicity - seen by psych who states pt is malingering and would like to discharge.

## 2019-12-21 NOTE — ED PROVIDER NOTE - OBJECTIVE STATEMENT
48 y/o m with psych hx including multiple admissions presents to ED stating he took 25 pills of cogentin 0.5mg while waiting in waiting room.  Pt seen in ED day prior to presentation seen by psych and discharged also stating he would kill himself if he wasn't admitted.  Multiple psych admissions in past several months including one recently at [facility] in 11/19.  Pt states he feels sleepy and dizzy.  Denies vomiting, diarrhea, chest pain, shortness of breath.  Pt also has clonazepam and amphetamine in bottles in underwear but states he did not take those.

## 2019-12-21 NOTE — ED ADULT NURSE NOTE - OBJECTIVE STATEMENT
Received a 49 year old male with a chief complaint of lightheadedness, sleepiness, and lethargy. Patient reports that he took 25 tablets of his prescription phychiatric medications - reported to have been witness by registration staff. Patient brought in a prescription bottle of his Cogentin. Patient states that he wants to die and say good bye to his family. Patient presents with multiple personal affects inclusive of more than five pars of clothing, multiple bag of prescribed medications.   Patient awake and noted to be sleepy. Verbally responsive and coherent. Patient with noted depressed affect.  Belongings secured and screened by security. Constant observation maintained.

## 2019-12-21 NOTE — ED ADULT NURSE REASSESSMENT NOTE - NS ED NURSE REASSESS COMMENT FT1
received patient alert and oriented x 3 ,lying in bed sleeping comfortably , denies any pain . one to one observation in progress . Safety maintained .

## 2019-12-21 NOTE — ED BEHAVIORAL HEALTH ASSESSMENT NOTE - VIOLENCE RISK FACTORS:
Feeling of being under threat and being unable to control threat/Substance abuse/History of violation of a legal mandate (e.g., parole, probation, AOT)

## 2019-12-21 NOTE — ED BEHAVIORAL HEALTH ASSESSMENT NOTE - DETAILS
Discharge from [place] today Threatens he would kill himself if not admitted. Dr. Dunham Not available

## 2019-12-21 NOTE — ED PROVIDER NOTE - PATIENT PORTAL LINK FT
You can access the FollowMyHealth Patient Portal offered by [facility] by registering at the following website: [email or web address]. By joining [facility]’s FollowMyHealth portal, you will also be able to view your health information using other applications (apps) compatible with our system.

## 2019-12-21 NOTE — ED PROVIDER NOTE - NSFOLLOWUPINSTRUCTIONS_ED_ALL_ED_FT
Follow up with your outpatient psych clinic.    Take your regularly prescribed medications.    DEPRESSION - Discharge Care     Depression    WHAT YOU NEED TO KNOW:    Depression is a medical condition that causes feelings of sadness or hopelessness that do not go away. Depression may cause you to lose interest in things you used to enjoy. These feelings may interfere with your daily life.    DISCHARGE INSTRUCTIONS:    Call your local emergency number (911 in the ) if:     You think about harming yourself or someone else.      You have done something on purpose to hurt yourself.    Call your therapist or doctor if:     Your symptoms do not improve.      You cannot make it to your next appointment.       You have new symptoms.      You have questions or concerns about your condition or care.    The following resources are available at any time to help you, if needed:     National Suicide Prevention Lifeline: 1-867.423.6304 (8-410-725-TALK)      Suicide Hotline: 1-340.424.7898 (6-215-JIPWNLE)      For a list of international numbers: https://[place].org/find-help/international-resources/    Medicines:     Antidepressants may be given to improve or balance your mood. You may need to take this medicine for several weeks before you begin to feel better.      Take your medicine as directed. Contact your healthcare provider if you think your medicine is not helping or if you have side effects. Tell him of her if you are allergic to any medicine. Keep a list of the medicines, vitamins, and herbs you take. Include the amounts, and when and why you take them. Bring the list or the pill bottles to follow-up visits. Carry your medicine list with you in case of an emergency.    Therapy is often used together with medicine to relieve depression. Therapy is a way for you to talk about your feelings and anything that may be causing depression. Therapy can be done alone or in a group. It may also be done with family members or a significant other.    Self-care:     Get regular physical activity. Try to be active for 30 minutes, 3 to 5 days a week. Physical activity can help relieve depression. Work with your healthcare provider to develop a plan that you enjoy. It may help to ask someone to be active with you.      Create a regular sleep schedule. A routine can help you relax before bed. Listen to music, read, or do yoga. Try to go to bed and wake up at the same time every day. Sleep is important for emotional health.      Eat a variety of healthy foods. Healthy foods include fruits, vegetables, whole-grain breads, low-fat dairy products, lean meats, fish, and cooked beans. A healthy meal plan is low in fat, salt, and added sugar.      Do not drink alcohol or use drugs. Alcohol and drugs can make depression worse. Talk to your therapist or doctor if you need help quitting.    Follow up with your healthcare provider as directed: Your healthcare provider will monitor your progress at follow-up visits. He or she will also monitor your medicine if you take antidepressants. Your healthcare provider will ask if the medicine is helping. Tell him or her about any side effects or problems you may have with your medicine. The type or amount of medicine may need to be changed. Write down your questions so you remember to ask them during your visits.       © Copyright [employer] 2019       back to top                      © Copyright [employer] 2019

## 2019-12-21 NOTE — ED PROVIDER NOTE - QRS
SPECIFIC PATIENT INSTRUCTIONS FROM THE PHYSICIAN WHO TREATED YOU IN THE ER TODAY:  1.  Prilosec over-the-counter for your GERD. 2.  Over the counter Maalox antacid-antigas for abdominal pain as instructed on the packaging. 3.  Avoid alcohol use and late meals, since these can both cause GERD exacerbations. 4.   FOLLOW UP APPOINTMENT:  Your primary doctor in 3-4 days. Patient Education        Gastroesophageal Reflux Disease (GERD): Care Instructions  Your Care Instructions    Gastroesophageal reflux disease (GERD) is the backward flow of stomach acid into the esophagus. The esophagus is the tube that leads from your throat to your stomach. A one-way valve prevents the stomach acid from moving up into this tube. When you have GERD, this valve does not close tightly enough. If you have mild GERD symptoms including heartburn, you may be able to control the problem with antacids or over-the-counter medicine. Changing your diet, losing weight, and making other lifestyle changes can also help reduce symptoms. Follow-up care is a key part of your treatment and safety. Be sure to make and go to all appointments, and call your doctor if you are having problems. It's also a good idea to know your test results and keep a list of the medicines you take. How can you care for yourself at home? · Take your medicines exactly as prescribed. Call your doctor if you think you are having a problem with your medicine. · Your doctor may recommend over-the-counter medicine. For mild or occasional indigestion, antacids, such as Tums, Gaviscon, Mylanta, or Maalox, may help. Your doctor also may recommend over-the-counter acid reducers, such as Pepcid AC, Tagamet HB, Zantac 75, or Prilosec. Read and follow all instructions on the label. If you use these medicines often, talk with your doctor. · Change your eating habits. ? It's best to eat several small meals instead of two or three large meals. ?  After you eat, wait 2 to 3 hours before you lie down. ? Chocolate, mint, and alcohol can make GERD worse. ? Spicy foods, foods that have a lot of acid (like tomatoes and oranges), and coffee can make GERD symptoms worse in some people. If your symptoms are worse after you eat a certain food, you may want to stop eating that food to see if your symptoms get better. · Do not smoke or chew tobacco. Smoking can make GERD worse. If you need help quitting, talk to your doctor about stop-smoking programs and medicines. These can increase your chances of quitting for good. · If you have GERD symptoms at night, raise the head of your bed 6 to 8 inches by putting the frame on blocks or placing a foam wedge under the head of your mattress. (Adding extra pillows does not work.)  · Do not wear tight clothing around your middle. · Lose weight if you need to. Losing just 5 to 10 pounds can help. When should you call for help? Call your doctor now or seek immediate medical care if:    · You have new or different belly pain.     · Your stools are black and tarlike or have streaks of blood.    Watch closely for changes in your health, and be sure to contact your doctor if:    · Your symptoms have not improved after 2 days.     · Food seems to catch in your throat or chest.   Where can you learn more? Go to http://hayde-jil.info/. Enter V645 in the search box to learn more about \"Gastroesophageal Reflux Disease (GERD): Care Instructions. \"  Current as of: November 7, 2018  Content Version: 12.2  © 7357-6527 [employer]. Care instructions adapted under license by [employer] (which disclaims liability or warranty for this information). If you have questions about a medical condition or this instruction, always ask your healthcare professional. [place] [address] any warranty or liability for your use of this information.          Patient Education        Shortness of Breath: Care Instructions  Your Care Instructions  Shortness of breath has many causes. Sometimes conditions such as anxiety can lead to shortness of breath. Some people get mild shortness of breath when they exercise. Trouble breathing also can be a symptom of a serious problem, such as asthma, lung disease, emphysema, heart problems, and pneumonia. If your shortness of breath continues, you may need tests and treatment. Watch for any changes in your breathing and other symptoms. Follow-up care is a key part of your treatment and safety. Be sure to make and go to all appointments, and call your doctor if you are having problems. It's also a good idea to know your test results and keep a list of the medicines you take. How can you care for yourself at home? · Do not smoke or allow others to smoke around you. If you need help quitting, talk to your doctor about stop-smoking programs and medicines. These can increase your chances of quitting for good. · Get plenty of rest and sleep. · Take your medicines exactly as prescribed. Call your doctor if you think you are having a problem with your medicine. · Find healthy ways to deal with stress. ? Exercise daily. ? Get plenty of sleep. ? Eat regularly and well. When should you call for help? Call 911 anytime you think you may need emergency care. For example, call if:    · You have severe shortness of breath.     · You have symptoms of a heart attack. These may include:  ? Chest pain or pressure, or a strange feeling in the chest.  ? Sweating. ? Shortness of breath. ? Nausea or vomiting. ? Pain, pressure, or a strange feeling in the back, neck, jaw, or upper belly or in one or both shoulders or arms. ? Lightheadedness or sudden weakness. ? A fast or irregular heartbeat. After you call 911, the  may tell you to chew 1 adult-strength or 2 to 4 low-dose aspirin. Wait for an ambulance.  Do not try to drive yourself.    Call your doctor now or seek immediate medical care if:    · Your shortness of breath gets worse or you start to wheeze. Wheezing is a high-pitched sound when you breathe.     · You wake up at night out of breath or have to prop your head up on several pillows to breathe.     · You are short of breath after only light activity or while at rest.    Watch closely for changes in your health, and be sure to contact your doctor if:    · You do not get better over the next 1 to 2 days. Where can you learn more? Go to http://hayde-jil.info/. Enter S780 in the search box to learn more about \"Shortness of Breath: Care Instructions. \"  Current as of: 2019  Content Version: 12.2  © 8207-1206 [employer]. Care instructions adapted under license by [employer] (which disclaims liability or warranty for this information). If you have questions about a medical condition or this instruction, always ask your healthcare professional. [address] any warranty or liability for your use of this information. [employer] Activation    Thank you for requesting access to [employer]. Please follow the instructions below to securely access and download your online medical record. [employer] allows you to send messages to your doctor, view your test results, renew your prescriptions, schedule appointments, and more. How Do I Sign Up? In your internet browser, go to [email or web address]. Click on the First Time User? Click Here link in the Sign In box. You will see the New Member Sign Up page. Enter your [employer] Access Code exactly as it appears below. You will not need to use this code after you´ve completed the sign-up process. If you do not sign up before the expiration date, you must request a new code.     [employer] Access Code: [email or web address]  Expires: 3/28/2019  2:27 PM (This is the date your [employer] access code will )    Enter the last four digits of your Social Security Number (xxxx) and Date of Birth (mm/dd/yyyy) as indicated and click Submit. You will be taken to the next sign-up page. Create a [employer] ID. This will be your [employer] login ID and cannot be changed, so think of one that is secure and easy to remember. Create a [employer] password. You can change your password at any time. Enter your Password Reset Question and Answer. This can be used at a later time if you forget your password. Enter your e-mail address. You will receive e-mail notification when new information is available in [address] Click Sign Up. You can now view and download portions of your medical record. Click the [email or web address] link to download a portable copy of your medical information. Additional Information    If you have questions, please visit the Frequently Asked Questions section of the [employer] website at [email or web address] Remember, [employer] is NOT to be used for urgent needs. For medical emergencies, dial 911. 90

## 2019-12-21 NOTE — ED BEHAVIORAL HEALTH ASSESSMENT NOTE - DESCRIPTION
In ER for the past 4 hours, multiple sets of vitals signs have been normal.  No signs of anticholinergic toxicity.  He was sleeping comfortable, trying to ignore writer initially.  But overall calm and superficial cooperative.  Received IVF. Denies See HPI

## 2019-12-21 NOTE — ED BEHAVIORAL HEALTH ASSESSMENT NOTE - SUMMARY
50 y/o  M, domiciled at a shelter, unemployed, on parole, under care of [facility], single, non-caregiver, with legal history, PPhx of paranoid disorder, antisocial P/D,  and ASPD (self-reported hx of PTSD), Self-reported SA by OD, at least 4 psych admissions [facility] (12/9-3/22/2019, 10/21-11/ 6/2019; [place] 11/23-yesterday), presented to the ED for worsening paranoia and suicidal ideation right after being discharged from [facility] last night, being D/C, stayed at waiting area, rechecked himself in claiming that he took some pills to kill himself and requests for admission so that he can go to [facility] from inpatient unit.      On evaluation, patient presents with chronic paranoid ideation for 2 years, no changes in intensity or frequency, he endorses suicidal ideation when being told that he is not admitted.  He took the pills (unsure if he actually did) right at the ER where he can get immediate medical attention and checked himself in right away.  He has solid plan of going to a state hospital.  Given the history of legal issue, on parole, antisocial personality disorder, the context that he was discharge from a hospital to a shelter and not liking it, seeking long term stay in state hospital, it is highly suspicious that patient request admission for secondary gain of place to stay and controlled substance.  He displays elevated chronic risk to self and others but no evident of imminent risk.  There is no indication for inpatient psychiatric admission.  He has an outpatient team.  Since it is weekend, there is no collateral information from  and P.O.  Spoke with ER  Aga who will coordinate with disposition to an alternate shelter if possible.
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
## 2019-12-22 LAB
CULTURE RESULTS: NO GROWTH — SIGNIFICANT CHANGE UP
SPECIMEN SOURCE: SIGNIFICANT CHANGE UP

## 2019-12-24 NOTE — CHART NOTE - NSCHARTNOTEFT_GEN_A_CORE
Name: Jose LAKE  Date of Service  3/8/2019  Group Name: Self-Other Group                     	Number of Attendees: 8  Duration 45 minutes  Diagnosis Code: F25.9  DOCUMENTATION OF PARTICIPATION AND RESPONSE OF INDIVIDUAL TO GROUP TREATMENT  Behavior in Group (check all that apply):  ? Showed insight             ? XActive in discussion            ?X Offered constructive input         ? No apparent interest  ?X Showed interest           ? Quietly engaged                ? Supportive to others                  ? Appeared distracted  ? Showed leadership        ? Withdrawn                       ?Not supportive to others            ? Disruptive  Individual’s Mood:    ?Stable     ?XDepressed/Sad     ?XAnxious     ?Angry     ?Other  Risk Assessment  	X? None Reported or Observed   ? Danger to Self:  ? Ideation ? Plan  ? Intent  ? Attempt       ? Danger to Others:  ? Ideation ? Plan  ? Intent  ? Attempt   Describe in detail (cont. below as needed):    Goal(s)/Objective(s) Addressed:  Assessment, Decrease symptoms, Improve level of independent functioning, improve social/vocational/coping skills, Improve interpersonal relationships  Intervention(s) / Method(s) Provided: Stress management and coping skills, interpersonal effectiveness skills were addressed  Self and Others Group:  How one sees oneself vs how others view oneself activity   Response to Intervention(s) and Progress Toward Goals and Objectives: Pt reported that he views himself as struggling and that it has been embarrassing for him. PT received positive feedback from others and towards the end of the group stated that he realized that he views himself much more negatively.   Plan/Additional Information (any recommendations or determinations for continued or revised treatment): Continue attending group on Mondays and Fridays   Completed By - Print Staff Name/Credentials:  Lev Atwood, PhD / Staff Psychologist	CPT Code 25816  Start Time 10:45		  Stop Time  11:30	   Duration in Minutes 45 minutes Negative

## 2019-12-25 DIAGNOSIS — T42.8X2A POISONING BY ANTIPARKINSONISM DRUGS AND OTHER CENTRAL MUSCLE-TONE DEPRESSANTS, INTENTIONAL SELF-HARM, INITIAL ENCOUNTER: ICD-10-CM

## 2020-01-22 ENCOUNTER — INPATIENT (INPATIENT)
Facility: HOSPITAL | Age: 50
LOS: 6 days | Discharge: PSYCHIATRIC OR SUBSTANCE USE FACILITY | DRG: 866 | End: 2020-01-29
Attending: STUDENT IN AN ORGANIZED HEALTH CARE EDUCATION/TRAINING PROGRAM | Admitting: STUDENT IN AN ORGANIZED HEALTH CARE EDUCATION/TRAINING PROGRAM
Payer: COMMERCIAL

## 2020-01-22 VITALS
SYSTOLIC BLOOD PRESSURE: 125 MMHG | WEIGHT: 212.08 LBS | RESPIRATION RATE: 18 BRPM | TEMPERATURE: 100 F | DIASTOLIC BLOOD PRESSURE: 83 MMHG | HEIGHT: 67 IN | OXYGEN SATURATION: 96 % | HEART RATE: 95 BPM

## 2020-01-22 DIAGNOSIS — J11.1 INFLUENZA DUE TO UNIDENTIFIED INFLUENZA VIRUS WITH OTHER RESPIRATORY MANIFESTATIONS: ICD-10-CM

## 2020-01-22 DIAGNOSIS — R63.8 OTHER SYMPTOMS AND SIGNS CONCERNING FOOD AND FLUID INTAKE: ICD-10-CM

## 2020-01-22 DIAGNOSIS — R69 ILLNESS, UNSPECIFIED: ICD-10-CM

## 2020-01-22 DIAGNOSIS — F19.10 OTHER PSYCHOACTIVE SUBSTANCE ABUSE, UNCOMPLICATED: ICD-10-CM

## 2020-01-22 DIAGNOSIS — R45.850 HOMICIDAL IDEATIONS: ICD-10-CM

## 2020-01-22 DIAGNOSIS — F90.9 ATTENTION-DEFICIT HYPERACTIVITY DISORDER, UNSPECIFIED TYPE: ICD-10-CM

## 2020-01-22 DIAGNOSIS — Z72.0 TOBACCO USE: ICD-10-CM

## 2020-01-22 DIAGNOSIS — R45.851 SUICIDAL IDEATIONS: ICD-10-CM

## 2020-01-22 DIAGNOSIS — F29 UNSPECIFIED PSYCHOSIS NOT DUE TO A SUBSTANCE OR KNOWN PHYSIOLOGICAL CONDITION: ICD-10-CM

## 2020-01-22 DIAGNOSIS — Z91.89 OTHER SPECIFIED PERSONAL RISK FACTORS, NOT ELSEWHERE CLASSIFIED: ICD-10-CM

## 2020-01-22 LAB
ALBUMIN SERPL ELPH-MCNC: 4.9 G/DL — SIGNIFICANT CHANGE UP (ref 3.3–5)
ALP SERPL-CCNC: 70 U/L — SIGNIFICANT CHANGE UP (ref 40–120)
ALT FLD-CCNC: 22 U/L — SIGNIFICANT CHANGE UP (ref 10–45)
AMPHET UR-MCNC: POSITIVE
ANION GAP SERPL CALC-SCNC: 15 MMOL/L — SIGNIFICANT CHANGE UP (ref 5–17)
APPEARANCE UR: CLEAR — SIGNIFICANT CHANGE UP
AST SERPL-CCNC: 34 U/L — SIGNIFICANT CHANGE UP (ref 10–40)
BARBITURATES UR SCN-MCNC: NEGATIVE — SIGNIFICANT CHANGE UP
BASOPHILS # BLD AUTO: 0.07 K/UL — SIGNIFICANT CHANGE UP (ref 0–0.2)
BASOPHILS NFR BLD AUTO: 1 % — SIGNIFICANT CHANGE UP (ref 0–2)
BENZODIAZ UR-MCNC: POSITIVE
BILIRUB SERPL-MCNC: 1 MG/DL — SIGNIFICANT CHANGE UP (ref 0.2–1.2)
BILIRUB UR-MCNC: NEGATIVE — SIGNIFICANT CHANGE UP
BUN SERPL-MCNC: 12 MG/DL — SIGNIFICANT CHANGE UP (ref 7–23)
CALCIUM SERPL-MCNC: 9.8 MG/DL — SIGNIFICANT CHANGE UP (ref 8.4–10.5)
CHLORIDE SERPL-SCNC: 97 MMOL/L — SIGNIFICANT CHANGE UP (ref 96–108)
CO2 SERPL-SCNC: 27 MMOL/L — SIGNIFICANT CHANGE UP (ref 22–31)
COCAINE METAB.OTHER UR-MCNC: NEGATIVE — SIGNIFICANT CHANGE UP
COLOR SPEC: YELLOW — SIGNIFICANT CHANGE UP
CREAT SERPL-MCNC: 0.99 MG/DL — SIGNIFICANT CHANGE UP (ref 0.5–1.3)
DIFF PNL FLD: NEGATIVE — SIGNIFICANT CHANGE UP
EOSINOPHIL # BLD AUTO: 0.05 K/UL — SIGNIFICANT CHANGE UP (ref 0–0.5)
EOSINOPHIL NFR BLD AUTO: 0.7 % — SIGNIFICANT CHANGE UP (ref 0–6)
ETHANOL SERPL-MCNC: <10 MG/DL — SIGNIFICANT CHANGE UP (ref 0–10)
FLU A RESULT: DETECTED
FLU A RESULT: DETECTED
FLUAV AG NPH QL: DETECTED
FLUBV AG NPH QL: SIGNIFICANT CHANGE UP
GLUCOSE BLDC GLUCOMTR-MCNC: 104 MG/DL — HIGH (ref 70–99)
GLUCOSE SERPL-MCNC: 95 MG/DL — SIGNIFICANT CHANGE UP (ref 70–99)
GLUCOSE UR QL: NEGATIVE — SIGNIFICANT CHANGE UP
HCT VFR BLD CALC: 40.7 % — SIGNIFICANT CHANGE UP (ref 39–50)
HGB BLD-MCNC: 13.5 G/DL — SIGNIFICANT CHANGE UP (ref 13–17)
IMM GRANULOCYTES NFR BLD AUTO: 0.3 % — SIGNIFICANT CHANGE UP (ref 0–1.5)
KETONES UR-MCNC: NEGATIVE — SIGNIFICANT CHANGE UP
LEUKOCYTE ESTERASE UR-ACNC: NEGATIVE — SIGNIFICANT CHANGE UP
LYMPHOCYTES # BLD AUTO: 0.54 K/UL — LOW (ref 1–3.3)
LYMPHOCYTES # BLD AUTO: 7.9 % — LOW (ref 13–44)
MCHC RBC-ENTMCNC: 27.2 PG — SIGNIFICANT CHANGE UP (ref 27–34)
MCHC RBC-ENTMCNC: 33.2 GM/DL — SIGNIFICANT CHANGE UP (ref 32–36)
MCV RBC AUTO: 81.9 FL — SIGNIFICANT CHANGE UP (ref 80–100)
METHADONE UR-MCNC: NEGATIVE — SIGNIFICANT CHANGE UP
MONOCYTES # BLD AUTO: 0.48 K/UL — SIGNIFICANT CHANGE UP (ref 0–0.9)
MONOCYTES NFR BLD AUTO: 7 % — SIGNIFICANT CHANGE UP (ref 2–14)
NEUTROPHILS # BLD AUTO: 5.69 K/UL — SIGNIFICANT CHANGE UP (ref 1.8–7.4)
NEUTROPHILS NFR BLD AUTO: 83.1 % — HIGH (ref 43–77)
NITRITE UR-MCNC: NEGATIVE — SIGNIFICANT CHANGE UP
NRBC # BLD: 0 /100 WBCS — SIGNIFICANT CHANGE UP (ref 0–0)
OPIATES UR-MCNC: NEGATIVE — SIGNIFICANT CHANGE UP
PCP SPEC-MCNC: SIGNIFICANT CHANGE UP
PCP UR-MCNC: NEGATIVE — SIGNIFICANT CHANGE UP
PH UR: 5.5 — SIGNIFICANT CHANGE UP (ref 5–8)
PLATELET # BLD AUTO: 289 K/UL — SIGNIFICANT CHANGE UP (ref 150–400)
POTASSIUM SERPL-MCNC: 4.5 MMOL/L — SIGNIFICANT CHANGE UP (ref 3.5–5.3)
POTASSIUM SERPL-SCNC: 4.5 MMOL/L — SIGNIFICANT CHANGE UP (ref 3.5–5.3)
PROT SERPL-MCNC: 7.7 G/DL — SIGNIFICANT CHANGE UP (ref 6–8.3)
PROT UR-MCNC: NEGATIVE MG/DL — SIGNIFICANT CHANGE UP
RBC # BLD: 4.97 M/UL — SIGNIFICANT CHANGE UP (ref 4.2–5.8)
RBC # FLD: 14.7 % — HIGH (ref 10.3–14.5)
RSV RESULT: SIGNIFICANT CHANGE UP
RSV RNA RESP QL NAA+PROBE: SIGNIFICANT CHANGE UP
SODIUM SERPL-SCNC: 139 MMOL/L — SIGNIFICANT CHANGE UP (ref 135–145)
SP GR SPEC: 1.02 — SIGNIFICANT CHANGE UP (ref 1–1.03)
THC UR QL: NEGATIVE — SIGNIFICANT CHANGE UP
TSH SERPL-MCNC: 1.11 UIU/ML — SIGNIFICANT CHANGE UP (ref 0.35–4.94)
UROBILINOGEN FLD QL: 0.2 E.U./DL — SIGNIFICANT CHANGE UP
WBC # BLD: 6.85 K/UL — SIGNIFICANT CHANGE UP (ref 3.8–10.5)
WBC # FLD AUTO: 6.85 K/UL — SIGNIFICANT CHANGE UP (ref 3.8–10.5)

## 2020-01-22 PROCEDURE — 99285 EMERGENCY DEPT VISIT HI MDM: CPT

## 2020-01-22 PROCEDURE — 90792 PSYCH DIAG EVAL W/MED SRVCS: CPT

## 2020-01-22 PROCEDURE — 71046 X-RAY EXAM CHEST 2 VIEWS: CPT | Mod: 26

## 2020-01-22 PROCEDURE — 93010 ELECTROCARDIOGRAM REPORT: CPT

## 2020-01-22 PROCEDURE — 99223 1ST HOSP IP/OBS HIGH 75: CPT | Mod: GC

## 2020-01-22 RX ORDER — HALOPERIDOL DECANOATE 100 MG/ML
5 INJECTION INTRAMUSCULAR ONCE
Refills: 0 | Status: COMPLETED | OUTPATIENT
Start: 2020-01-22 | End: 2020-01-22

## 2020-01-22 RX ORDER — ACETAMINOPHEN 500 MG
650 TABLET ORAL ONCE
Refills: 0 | Status: COMPLETED | OUTPATIENT
Start: 2020-01-22 | End: 2020-01-22

## 2020-01-22 RX ORDER — CLONAZEPAM 1 MG
2 TABLET ORAL ONCE
Refills: 0 | Status: DISCONTINUED | OUTPATIENT
Start: 2020-01-22 | End: 2020-01-22

## 2020-01-22 RX ORDER — HALOPERIDOL DECANOATE 100 MG/ML
5 INJECTION INTRAMUSCULAR EVERY 8 HOURS
Refills: 0 | Status: DISCONTINUED | OUTPATIENT
Start: 2020-01-22 | End: 2020-01-29

## 2020-01-22 RX ORDER — MIRTAZAPINE 45 MG/1
45 TABLET, ORALLY DISINTEGRATING ORAL AT BEDTIME
Refills: 0 | Status: DISCONTINUED | OUTPATIENT
Start: 2020-01-22 | End: 2020-01-29

## 2020-01-22 RX ORDER — IBUPROFEN 200 MG
400 TABLET ORAL ONCE
Refills: 0 | Status: COMPLETED | OUTPATIENT
Start: 2020-01-22 | End: 2020-01-22

## 2020-01-22 RX ORDER — BUPRENORPHINE AND NALOXONE 2; .5 MG/1; MG/1
1 TABLET SUBLINGUAL ONCE
Refills: 0 | Status: DISCONTINUED | OUTPATIENT
Start: 2020-01-22 | End: 2020-01-22

## 2020-01-22 RX ORDER — CLONAZEPAM 1 MG
2 TABLET ORAL
Refills: 0 | Status: DISCONTINUED | OUTPATIENT
Start: 2020-01-23 | End: 2020-01-29

## 2020-01-22 RX ORDER — ACETAMINOPHEN 500 MG
650 TABLET ORAL EVERY 6 HOURS
Refills: 0 | Status: DISCONTINUED | OUTPATIENT
Start: 2020-01-22 | End: 2020-01-29

## 2020-01-22 RX ORDER — QUETIAPINE FUMARATE 200 MG/1
100 TABLET, FILM COATED ORAL AT BEDTIME
Refills: 0 | Status: DISCONTINUED | OUTPATIENT
Start: 2020-01-22 | End: 2020-01-28

## 2020-01-22 RX ORDER — HALOPERIDOL DECANOATE 100 MG/ML
5 INJECTION INTRAMUSCULAR EVERY 12 HOURS
Refills: 0 | Status: DISCONTINUED | OUTPATIENT
Start: 2020-01-22 | End: 2020-01-25

## 2020-01-22 RX ADMIN — MIRTAZAPINE 45 MILLIGRAM(S): 45 TABLET, ORALLY DISINTEGRATING ORAL at 21:51

## 2020-01-22 RX ADMIN — Medication 400 MILLIGRAM(S): at 17:41

## 2020-01-22 RX ADMIN — BUPRENORPHINE AND NALOXONE 1 TABLET(S): 2; .5 TABLET SUBLINGUAL at 20:28

## 2020-01-22 RX ADMIN — Medication 75 MILLIGRAM(S): at 20:27

## 2020-01-22 RX ADMIN — Medication 400 MILLIGRAM(S): at 20:55

## 2020-01-22 RX ADMIN — HALOPERIDOL DECANOATE 5 MILLIGRAM(S): 100 INJECTION INTRAMUSCULAR at 17:09

## 2020-01-22 RX ADMIN — Medication 650 MILLIGRAM(S): at 17:41

## 2020-01-22 RX ADMIN — Medication 650 MILLIGRAM(S): at 20:55

## 2020-01-22 RX ADMIN — QUETIAPINE FUMARATE 100 MILLIGRAM(S): 200 TABLET, FILM COATED ORAL at 21:52

## 2020-01-22 RX ADMIN — Medication 2 MILLIGRAM(S): at 17:41

## 2020-01-22 NOTE — ED BEHAVIORAL HEALTH ASSESSMENT NOTE - SUICIDE RISK FACTORS
Conduct problems current/past/Insomnia/Agitation/Severe Anxiety/Panic/Psychotic disorder current/past/Alcohol/Substance abuse disorders/ADHD current/past/Cluster B Personality disorders or traits current/past

## 2020-01-22 NOTE — ED PROVIDER NOTE - CARE PLAN
Principal Discharge DX:	Suicidal ideation  Secondary Diagnosis:	Schizoaffective disorder Principal Discharge DX:	Suicidal ideation  Secondary Diagnosis:	Schizoaffective disorder  Secondary Diagnosis:	Influenza

## 2020-01-22 NOTE — H&P ADULT - ATTENDING COMMENTS
patient seen and examined a/f SI/ HI, flu; pt endorses SI/ HI, paranoid thoughts, wishes to be transferred to [facility]   reviewed pertinent data, h&p, w/ edits    PE  findings as above except  exam deferred ; lungs CTA b/l , MMM    a/p:   1. FLU: on tamiflu, encouraged PO fluids  2. SI/HI: on 1:1, transfer pending when medically stable   rest of plan as above
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
## 2020-01-22 NOTE — H&P ADULT - PROBLEM SELECTOR PROBLEM 5
Transition of care performed with sharing of clinical summary Attention deficit hyperactivity disorder (ADHD), unspecified ADHD type

## 2020-01-22 NOTE — H&P ADULT - NSHPPHYSICALEXAM_GEN_ALL_CORE
.  VITAL SIGNS:  T(C): 37.1 (01-22-20 @ 20:56), Max: 38.3 (01-22-20 @ 15:28)  T(F): 98.8 (01-22-20 @ 20:56), Max: 100.9 (01-22-20 @ 15:28)  HR: 68 (01-22-20 @ 20:56) (68 - 95)  BP: 104/59 (01-22-20 @ 20:56) (96/62 - 125/83)  BP(mean): --  RR: 18 (01-22-20 @ 20:56) (16 - 18)  SpO2: 93% (01-22-20 @ 20:56) (93% - 98%)  Wt(kg): --    PHYSICAL EXAM:    Constitutional: WDWN resting comfortably in bed; NAD  Head: NC/AT  Eyes: PERRL, EOMI, clear conjunctiva  ENT: no nasal discharge; uvula midline, no oropharyngeal erythema or exudates; MMM  Neck: supple; no JVD or thyromegaly  Respiratory: CTA B/L; no W/R/R, no retractions  Cardiac: +S1/S2; RRR; no M/R/G; PMI non-displaced  Gastrointestinal: soft, NT/ND; no rebound or guarding; +BSx4  Genitourinary: normal external genitalia  Back: spine midline, no bony tenderness or step-offs; no CVAT B/L  Extremities: WWP, no clubbing or cyanosis; no peripheral edema  Musculoskeletal: NROM x4; no joint swelling, tenderness or erythema  Vascular: 2+ radial, femoral, DP/PT pulses B/L  Dermatologic: skin warm, dry and intact; no rashes, wounds, or scars  Lymphatic: no submandibular or cervical LAD  Neurologic: AAOx3; CNII-XII grossly intact; no focal deficits  Psychiatric: affect and characteristics of appearance, verbalizations, behaviors are appropriate

## 2020-01-22 NOTE — ED PROVIDER NOTE - OBJECTIVE STATEMENT
50 y/o M with PMHx bipolar disease, schizophrenia (on haldol and Seroquel), reports compliance with meds, presents to the ED after hearing voices telling him to kill himself and following him for the past 2 days. Pt lives in a shelter and reports feeling more paranoid within the past 24 hours. Denies EtOH/drug use, chest pain, SOB, dizziness or syncope. Pt is tolerating PO well.

## 2020-01-22 NOTE — ED BEHAVIORAL HEALTH ASSESSMENT NOTE - SUMMARY
50 y/o  M, domiciled at a shelter, unemployed, on parole, under care of [facility], single, non-caregiver, with legal history, hx/o aggressive behavior towards staff on [facility], PPhx of unspecified paranoid disorder, antisocial P/D, and ASPD (self-reported hx of PTSD), heroin use in [facility], on Suboxone, Self-reported three past SA's by OD, at least 4 psych admissions [facility] (12/9-3/22/2019, 10/21-11/ 6/2019; [facility] 11/23-yesterday), presented to the ED for worsening paranoia and thoughts to stab self and people who are following him.    On evaluation pt appears paranoid, looking around ED, unable to contract for safety stating he is having thoughts to stab himself or someone who is following him.  Pt is at elevated risk for harm self and others requiring inpatient psychiatric hospitalization for safety.

## 2020-01-22 NOTE — ED BEHAVIORAL HEALTH ASSESSMENT NOTE - AXIS IV
Problems with primary support/Problem related to social environment/Educational problems/Economic problems/Problems with interaction with legal system/Housing problems/Occupational problems

## 2020-01-22 NOTE — ED ADULT NURSE REASSESSMENT NOTE - NS ED NURSE REASSESS COMMENT FT1
medicated for fever and is positive for the flu --plan for admission to medicine maintained on constant obs

## 2020-01-22 NOTE — ED ADULT NURSE NOTE - OBJECTIVE STATEMENT
expresses fears about people following him and going to "attack him"; denies physical symptoms; states has been taking meds and going to group therapy daily at the shelter; has psych doctor appointment on 1/23; has family support weekly; maintained on constant observation

## 2020-01-22 NOTE — ED BEHAVIORAL HEALTH ASSESSMENT NOTE - ACTIVATING EVENTS/STRESSORS
Legal problems/Inadequate social supports/Pending incarceration or homelessness/Recent inpatient discharge

## 2020-01-22 NOTE — ED BEHAVIORAL HEALTH ASSESSMENT NOTE - PSYCHIATRIC ISSUES AND PLAN (INCLUDE STANDING AND PRN MEDICATION)
Confirm meds with pharmacy in am. For now continue Klonopin 2 mg bid, Remeron 45 mg qhs, Seroquel 100 mg qhs, Haldol 5 mg bid. Haldol 5 mg q 8 hrs prn agitation. ,

## 2020-01-22 NOTE — ED BEHAVIORAL HEALTH ASSESSMENT NOTE - CURRENT MEDICATION
Adderall 20mg TID, klonopin 2mg TID, Suboxone 8 mg tid  seroquel 100 mg qhs  remeron 45 mg qhs  haldol 5 mg bid

## 2020-01-22 NOTE — ED PROVIDER NOTE - CLINICAL SUMMARY MEDICAL DECISION MAKING FREE TEXT BOX
48 y/o M with PMHx bipolar disease and schizophrenia presents with paranoia and SI, no associated HI or specific plan. Pt here multiple times in the past, last visit 2 months ago. Plan for psych consult. 50 y/o M with PMHx bipolar disease and schizophrenia presents with paranoia and SI, no associated HI or specific plan. Pt here multiple times in the past, last visit 2 months ago. Plan for psych consult.  transfer to [facility] to accept  S

## 2020-01-22 NOTE — H&P ADULT - PROBLEM SELECTOR PLAN 2
c/w tamiflu   Droplet isolation seen by Psych, plan for psych admission at [facility] however pt tested Influenza positive.   Psych following recs appreciated. Pending Med rec, continue Klonopin 2 mg bid, Remeron 45 mg qhs, Seroquel 100 mg qhs, Haldol 5 mg bid. Haldol 5 mg q 8 hrs prn agitation.  Repeat EKG in AM

## 2020-01-22 NOTE — ED BEHAVIORAL HEALTH ASSESSMENT NOTE - DETAILS
[facility] attending Pt is unreliable historian. He reports hx/o 3 prior SA's last one month ago by OD on Seroquel reports allergies to Phenobarb, Depakote, Dylantin discuss with accepting hospital once the bed is located spoke with ED staff Not available

## 2020-01-22 NOTE — H&P ADULT - PROBLEM SELECTOR PLAN 4
Regular diet     Full code   no DVT prophylaxis needed hx of substance abuse on suboxone  - confirm dose in AM, one dose given in ER.

## 2020-01-22 NOTE — ED ADULT NURSE REASSESSMENT NOTE - NS ED NURSE REASSESS COMMENT FT1
Pt offered meal and drink, pt declined. Seated in chair, relaxing comfortably. Awaiting psych dispo.

## 2020-01-22 NOTE — H&P ADULT - NSICDXPASTMEDICALHX_GEN_ALL_CORE_FT
PAST MEDICAL HISTORY:  ADHD     PTSD (post-traumatic stress disorder)     Schizoaffective disorder     Substance abuse

## 2020-01-22 NOTE — ED ADULT NURSE NOTE - CAS EDN DISCHARGE ASSESSMENT
Patient baseline mental status/Alert and oriented to person, place and time/Pt alert and oriented X3, denies discomfort, 1:1 active for safety

## 2020-01-22 NOTE — H&P ADULT - NSHPLABSRESULTS_GEN_ALL_CORE
.  LABS:                         13.5   6.85  )-----------( 289      ( 2020 13:43 )             40.7         139  |  97  |  12  ----------------------------<  95  4.5   |  27  |  0.99    Ca    9.8      2020 13:43    TPro  7.7  /  Alb  4.9  /  TBili  1.0  /  DBili  x   /  AST  34  /  ALT  22  /  AlkPhos  70        Urinalysis Basic - ( 2020 13:47 )    Color: Yellow / Appearance: Clear / S.025 / pH: x  Gluc: x / Ketone: NEGATIVE  / Bili: Negative / Urobili: 0.2 E.U./dL   Blood: x / Protein: NEGATIVE mg/dL / Nitrite: NEGATIVE   Leuk Esterase: NEGATIVE / RBC: x / WBC x   Sq Epi: x / Non Sq Epi: x / Bacteria: x                RADIOLOGY, EKG & ADDITIONAL TESTS: Reviewed.

## 2020-01-22 NOTE — H&P ADULT - ASSESSMENT
49 yoM, with pmh of paranoid disorder, antisocial P/D, andd ASPD (self-reported hx of PTSD), recent psych admissions presenting for SI/HI ideations found to be Influenza positive.

## 2020-01-22 NOTE — ED ADULT TRIAGE NOTE - CHIEF COMPLAINT QUOTE
"People ar following me , they reading my text constantly , I can't take it no more , I think I will kill myself before they kill me,"

## 2020-01-22 NOTE — ED BEHAVIORAL HEALTH ASSESSMENT NOTE - DIFFERENTIAL
Psychosis  Heroin use in [facility] on Suboxone  r/o MDD with psychotic features  r/o antisocial personality disorder

## 2020-01-22 NOTE — H&P ADULT - PROBLEM SELECTOR PLAN 5
1) PCP Contacted on Admission: (Y/N) --> Name & Phone #:  2) Date of Contact with PCP:  3) PCP Contacted at Discharge: (Y/N)  4) Summary of Handoff Given to PCP:   5) Post-Discharge Appointment Date and Location: ADDENDUM: holding Adderall , in setting of psych sxs

## 2020-01-22 NOTE — ED ADULT NURSE NOTE - PMH
ADHD    Bipolar disorder    PTSD (post-traumatic stress disorder)    Schizoaffective disorder    Substance abuse

## 2020-01-22 NOTE — H&P ADULT - PROBLEM SELECTOR PLAN 1
seen by Psych, plan for psych admission at [facility] however pt tested Influenza positive.   Psych following recs appreciated. Pending Med rec, continue Klonopin 2 mg bid, Remeron 45 mg qhs, Seroquel 100 mg qhs, Haldol 5 mg bid. Haldol 5 mg q 8 hrs prn agitation.  Repeat EKG in AM c/w tamiflu   Droplet isolation

## 2020-01-22 NOTE — ED BEHAVIORAL HEALTH ASSESSMENT NOTE - RISK ASSESSMENT
· Suicide Risk Factors	Psychotic disorder current/past, prior SA's. forensic hx, hx/o violence    Activating Events/Stressors:  · Activating Events/Stressors	Pending incarceration or homelessness, Legal problems, Inadequate social supports  · Other	None    Suicide Protective Factors:  · Suicide Protective Factors	Identifies reasons for living, Has future plans, Fear of death or the actual act of killing self High Acute Suicide Risk

## 2020-01-22 NOTE — H&P ADULT - HISTORY OF PRESENT ILLNESS
49 yoM, with pmh of paranoid disorder, antisocial P/D,  and ASPD (self-reported hx of PTSD), recent psych admissions presenting for SI/HI ideations. Pt states he was discharged 4 months ago and started having paranoia and thinks he is being followed and monitored and that someone might harm him. He endorses HI ideations towards people who are following him. His symptoms have been worsening so he came to ER  Of note, he mentions that since yesterday he has been experiencing productive cough with yellow sputum, runny nose, headaches, muscle aches. His temp at home was 99. Endorses sick contacts at shelter. no recent travel  In ER, Febrile to 100.9, othewise VSS. WBC 6.85. Influenza +.  U tox with benzos and amphetamine. Pt seen by psych will need inpt psych once influenza treated.

## 2020-01-22 NOTE — ED BEHAVIORAL HEALTH ASSESSMENT NOTE - VIOLENCE RISK FACTORS:
Feeling of being under threat and being unable to control threat/Violent ideation/threat/speech/History of violation of a legal mandate (e.g., parole, probation, AOT)/Substance abuse/Irritability/Community stressors that increase the risk of destabilization/Antisocial behavior/cognition (past or present)/Impulsivity

## 2020-01-23 DIAGNOSIS — R65.10 SYSTEMIC INFLAMMATORY RESPONSE SYNDROME (SIRS) OF NON-INFECTIOUS ORIGIN WITHOUT ACUTE ORGAN DYSFUNCTION: ICD-10-CM

## 2020-01-23 DIAGNOSIS — J10.1 INFLUENZA DUE TO OTHER IDENTIFIED INFLUENZA VIRUS WITH OTHER RESPIRATORY MANIFESTATIONS: ICD-10-CM

## 2020-01-23 PROCEDURE — 99233 SBSQ HOSP IP/OBS HIGH 50: CPT

## 2020-01-23 PROCEDURE — 99233 SBSQ HOSP IP/OBS HIGH 50: CPT | Mod: GC

## 2020-01-23 RX ORDER — INFLUENZA VIRUS VACCINE 15; 15; 15; 15 UG/.5ML; UG/.5ML; UG/.5ML; UG/.5ML
0.5 SUSPENSION INTRAMUSCULAR ONCE
Refills: 0 | Status: DISCONTINUED | OUTPATIENT
Start: 2020-01-23 | End: 2020-01-29

## 2020-01-23 RX ORDER — IBUPROFEN 200 MG
400 TABLET ORAL ONCE
Refills: 0 | Status: COMPLETED | OUTPATIENT
Start: 2020-01-23 | End: 2020-01-23

## 2020-01-23 RX ORDER — BUPRENORPHINE AND NALOXONE 2; .5 MG/1; MG/1
1 TABLET SUBLINGUAL EVERY 8 HOURS
Refills: 0 | Status: DISCONTINUED | OUTPATIENT
Start: 2020-01-23 | End: 2020-01-29

## 2020-01-23 RX ORDER — BUPRENORPHINE AND NALOXONE 2; .5 MG/1; MG/1
0 TABLET SUBLINGUAL
Qty: 0 | Refills: 0 | DISCHARGE

## 2020-01-23 RX ADMIN — BUPRENORPHINE AND NALOXONE 1 FILM(S): 2; .5 TABLET SUBLINGUAL at 22:29

## 2020-01-23 RX ADMIN — BUPRENORPHINE AND NALOXONE 1 FILM(S): 2; .5 TABLET SUBLINGUAL at 14:16

## 2020-01-23 RX ADMIN — Medication 650 MILLIGRAM(S): at 15:02

## 2020-01-23 RX ADMIN — Medication 400 MILLIGRAM(S): at 04:20

## 2020-01-23 RX ADMIN — HALOPERIDOL DECANOATE 5 MILLIGRAM(S): 100 INJECTION INTRAMUSCULAR at 17:25

## 2020-01-23 RX ADMIN — HALOPERIDOL DECANOATE 5 MILLIGRAM(S): 100 INJECTION INTRAMUSCULAR at 05:52

## 2020-01-23 RX ADMIN — QUETIAPINE FUMARATE 100 MILLIGRAM(S): 200 TABLET, FILM COATED ORAL at 22:31

## 2020-01-23 RX ADMIN — Medication 650 MILLIGRAM(S): at 00:49

## 2020-01-23 RX ADMIN — Medication 650 MILLIGRAM(S): at 20:43

## 2020-01-23 RX ADMIN — Medication 75 MILLIGRAM(S): at 05:52

## 2020-01-23 RX ADMIN — Medication 650 MILLIGRAM(S): at 14:17

## 2020-01-23 RX ADMIN — Medication 2 MILLIGRAM(S): at 17:25

## 2020-01-23 RX ADMIN — Medication 650 MILLIGRAM(S): at 21:15

## 2020-01-23 RX ADMIN — Medication 75 MILLIGRAM(S): at 17:25

## 2020-01-23 RX ADMIN — Medication 650 MILLIGRAM(S): at 04:13

## 2020-01-23 RX ADMIN — MIRTAZAPINE 45 MILLIGRAM(S): 45 TABLET, ORALLY DISINTEGRATING ORAL at 22:31

## 2020-01-23 RX ADMIN — Medication 2 MILLIGRAM(S): at 05:52

## 2020-01-23 RX ADMIN — Medication 400 MILLIGRAM(S): at 05:40

## 2020-01-23 NOTE — PROGRESS NOTE ADULT - PROBLEM SELECTOR PLAN 3
Psych consulted for suicidal/homocidal ideations. Psych, plan for psych admission at [facility] however pt tested Influenza positive.   -following psych recs  -c/w Klonopin 2 mg bid  -c/w Remeron 45 mg qhs  -c/w Seroquel 100 mg qhs  -c/w Haldol 5 mg bid  -c/w Haldol 5 mg q 8 hrs prn agitation.

## 2020-01-23 NOTE — PROGRESS NOTE ADULT - SUBJECTIVE AND OBJECTIVE BOX
OVERNIGHT EVENTS: admitted overnight for suicidal ideations, found to be flu positive    SUBJECTIVE / INTERVAL HPI: Patient seen and examined at bedside. Patient laying comfortably in bed. Patient continues to report suicidal ideations with some preliminary planning; states he plans on harming himself with a gun although he does not own a gun, however also stated he may use a knife since he owns many at home. He reports productive cough with yellow sputum,  unchanged from admission to [facility]. Denies fever, chills, SOB, abdominal pain, chest pain    VITAL SIGNS:  Vital Signs Last 24 Hrs  T(C): 37.9 (2020 07:26), Max: 38.7 (2020 04:18)  T(F): 100.2 (2020 07:26), Max: 101.6 (2020 04:18)  HR: 74 (2020 07:26) (68 - 95)  BP: 110/64 (2020 07:26) (96/62 - 125/83)  BP(mean): --  RR: 18 (2020 07:26) (14 - 18)  SpO2: 96% (2020 07:26) (93% - 98%)        PHYSICAL EXAM:  General: NAD, Laying comfortably in bed  HEENT: NC/AT, anicteric sclera, MMM  Neck: supple  Cardiovascular: +S1/S2, RRR, No murmurs, rubs, gallops  Respiratory: CTA B/L, no W/R/R  Gastrointestinal: soft, NT/ND, +BSx4  Extremities: WWP, no edema, clubbing or cyanosis  Vascular: 2+ radial, DP/PT pulses B/L  Neurological: AAOx3, no focal deficits    MEDICATIONS  (STANDING):  clonazePAM  Tablet 2 milliGRAM(s) Oral two times a day  haloperidol     Tablet 5 milliGRAM(s) Oral every 12 hours  mirtazapine 45 milliGRAM(s) Oral at bedtime  oseltamivir 75 milliGRAM(s) Oral two times a day  QUEtiapine 100 milliGRAM(s) Oral at bedtime    MEDICATIONS  (PRN):  acetaminophen   Tablet .. 650 milliGRAM(s) Oral every 6 hours PRN Temp greater or equal to 38C (100.4F)  haloperidol     Tablet 5 milliGRAM(s) Oral every 8 hours PRN Agitation    Allergies    Depakote (Unknown)  Dilantin (Other)  phenobarbital (Other)    Intolerances        LABS:                        13.5   6.85  )-----------( 289      ( 2020 13:43 )             40.7         139  |  97  |  12  ----------------------------<  95  4.5   |  27  |  0.99    Ca    9.8      2020 13:43    TPro  7.7  /  Alb  4.9  /  TBili  1.0  /  DBili  x   /  AST  34  /  ALT  22  /  AlkPhos  70        Urinalysis Basic - ( 2020 13:47 )    Color: Yellow / Appearance: Clear / S.025 / pH: x  Gluc: x / Ketone: NEGATIVE  / Bili: Negative / Urobili: 0.2 E.U./dL   Blood: x / Protein: NEGATIVE mg/dL / Nitrite: NEGATIVE   Leuk Esterase: NEGATIVE / RBC: x / WBC x   Sq Epi: x / Non Sq Epi: x / Bacteria: x      CAPILLARY BLOOD GLUCOSE      POCT Blood Glucose.: 104 mg/dL (2020 20:00)          RADIOLOGY & ADDITIONAL TESTS: Reviewed. OVERNIGHT EVENTS: admitted overnight for suicidal ideations, found to have Tmax 100.9 and influenza positive    SUBJECTIVE / INTERVAL HPI: Patient seen and examined at bedside. Patient laying comfortably in bed. Patient continues to report suicidal ideations with some preliminary planning; states he plans on harming himself with a gun although he does not own a gun, however also stated he may use a knife since he owns many at home. He reports productive cough with yellow sputum,  unchanged from admission to [facility]. Denies fever, chills, SOB, abdominal pain, chest pain    VITAL SIGNS:  Vital Signs Last 24 Hrs  T(C): 37.9 (2020 07:26), Max: 38.7 (2020 04:18)  T(F): 100.2 (2020 07:26), Max: 101.6 (2020 04:18)  HR: 74 (2020 07:26) (68 - 95)  BP: 110/64 (2020 07:26) (96/62 - 125/83)  BP(mean): --  RR: 18 (2020 07:26) (14 - 18)  SpO2: 96% (2020 07:26) (93% - 98%)        PHYSICAL EXAM:  General: NAD, Laying comfortably in bed, looks stated age, not agitated   HEENT: NC/AT, anicteric sclera, MMM  Neck: supple  Cardiovascular: +S1/S2, RRR, No murmurs, rubs, gallops  Respiratory: CTA B/L, no wheezing or rhonci, mild intermittent cough  Gastrointestinal: soft, NT/ND, +BSx4  Extremities: WWP, no edema, no cyanosis  Vascular: 2+ radial, DP/PT pulses B/L  Neurological: AAOx3, no focal deficits  Psychiatric: continues to have suicidal and homicidal ideations with preliminary plan; auditory hallucinations (men who are chasing him and trying to harm him, unknown who they are), no visual hallucinations     MEDICATIONS  (STANDING):  clonazePAM  Tablet 2 milliGRAM(s) Oral two times a day  haloperidol     Tablet 5 milliGRAM(s) Oral every 12 hours  mirtazapine 45 milliGRAM(s) Oral at bedtime  oseltamivir 75 milliGRAM(s) Oral two times a day  QUEtiapine 100 milliGRAM(s) Oral at bedtime    MEDICATIONS  (PRN):  acetaminophen   Tablet .. 650 milliGRAM(s) Oral every 6 hours PRN Temp greater or equal to 38C (100.4F)  haloperidol     Tablet 5 milliGRAM(s) Oral every 8 hours PRN Agitation    Allergies    Depakote (Unknown)  Dilantin (Other)  phenobarbital (Other)    Intolerances        LABS:                        13.5   6.85  )-----------( 289      ( 2020 13:43 )             40.7         139  |  97  |  12  ----------------------------<  95  4.5   |  27  |  0.99    Ca    9.8      2020 13:43    TPro  7.7  /  Alb  4.9  /  TBili  1.0  /  DBili  x   /  AST  34  /  ALT  22  /  AlkPhos  70        Urinalysis Basic - ( 2020 13:47 )    Color: Yellow / Appearance: Clear / S.025 / pH: x  Gluc: x / Ketone: NEGATIVE  / Bili: Negative / Urobili: 0.2 E.U./dL   Blood: x / Protein: NEGATIVE mg/dL / Nitrite: NEGATIVE   Leuk Esterase: NEGATIVE / RBC: x / WBC x   Sq Epi: x / Non Sq Epi: x / Bacteria: x      CAPILLARY BLOOD GLUCOSE      POCT Blood Glucose.: 104 mg/dL (2020 20:00)          RADIOLOGY & ADDITIONAL TESTS: Reviewed.

## 2020-01-23 NOTE — PROGRESS NOTE ADULT - PROBLEM SELECTOR PLAN 1
Patient meeting 1/4 SIRS criteria (T 100.9). HR 90, concerns for impending sepsis in setting of positive influenza. Patient otherwise hemodynamically stable.   -c/w tamiflu 75mg bid, for a total 5 day course  -droplet precautions  -Tylenol PRN for fever

## 2020-01-23 NOTE — PATIENT PROFILE ADULT - VIOLENT/TRAUMATIC EVENT EXPERIENCED
Last 5 Patient Entered Readings                                                               Current 30 Day Average: 118/73     Recent Readings 4/15/2017 4/15/2017 4/10/2017 4/10/2017 3/31/2017    Systolic BP (mmHg) 134 139 135 133 108    Diastolic BP (mmHg) 78 82 88 89 71    Pulse 78 80 80 85 76          Follow up with Mrs. Jyoti Tyler completed. Patient is maintaining a low sodium diet and continuing her exercise regime. She called to see if she could bonnie a refill on her Valsartan so I forwarded that request to her PharmD. Overall, she is doing well and her readings are looking good. Patient did not have any further questions or concerns. I will follow up in a few weeks to see how she is doing and progressing.      
-

## 2020-01-23 NOTE — PROGRESS NOTE BEHAVIORAL HEALTH - NSBHFUPINTERVALHXFT_PSY_A_CORE
He still wants admission. Denies any substance use other than prescription benzos and adderall. He had temp of 101.3 at 4:00 am but temp has come down since. [facility] and [facility] will not admit him at this point until he has been 24 hours afebrile. I am starting to think that with continued treatment with haldol, seroquel, and remeron, he could be discharged soon. Although he reports SI, secondary gain for place to stay may be motivation to go to inpatient psych unit. His paranoia seems much better at this point. On Tamiflu. Denies any flu symptoms and said he had been feeling worse prior to coming as far as flu symptoms go.

## 2020-01-23 NOTE — PROGRESS NOTE ADULT - SUBJECTIVE AND OBJECTIVE BOX
Patient is a 49y old  Male who presents with a chief complaint of SI/ HI , flu (2020 08:30)      INTERVAL HPI/OVERNIGHT EVENTS:    Pt. seen and examined this morning  Pt. denies flu sx but appears to have rhinorrhea  +Fevers  Pt. reports SI    Review of Systems: 12 point review of systems otherwise negative    MEDICATIONS  (STANDING):  buprenorphine 8 mG/naloxone 2 mG SL Film 1 Film(s) SubLingual every 8 hours  clonazePAM  Tablet 2 milliGRAM(s) Oral two times a day  haloperidol     Tablet 5 milliGRAM(s) Oral every 12 hours  influenza   Vaccine 0.5 milliLiter(s) IntraMuscular once  mirtazapine 45 milliGRAM(s) Oral at bedtime  oseltamivir 75 milliGRAM(s) Oral two times a day  QUEtiapine 100 milliGRAM(s) Oral at bedtime    MEDICATIONS  (PRN):  acetaminophen   Tablet .. 650 milliGRAM(s) Oral every 6 hours PRN Temp greater or equal to 38C (100.4F)  haloperidol     Tablet 5 milliGRAM(s) Oral every 8 hours PRN Agitation      Allergies    Depakote (Unknown)  Dilantin (Other)  phenobarbital (Other)    Intolerances          Vital Signs Last 24 Hrs  T(C): 39.2 (2020 15:02), Max: 39.2 (2020 15:02)  T(F): 102.5 (2020 15:02), Max: 102.5 (2020 15:02)  HR: 87 (2020 15:02) (68 - 90)  BP: 102/57 (2020 15:02) (96/62 - 120/64)  BP(mean): --  RR: 18 (2020 15:02) (14 - 18)  SpO2: 95% (2020 15:02) (93% - 96%)  CAPILLARY BLOOD GLUCOSE      POCT Blood Glucose.: 104 mg/dL (2020 20:00)        Physical Exam:  (this morning)  Daily     Daily   General:  tired-appearing in NAD, calm and pleasant  HEENT:  MMM  Lungs:  CTA B/L  Skin: WWP  Neuro:  AAOx3    LABS:                        13.5   6.85  )-----------( 289      ( 2020 13:43 )             40.7     -22    139  |  97  |  12  ----------------------------<  95  4.5   |  27  |  0.99    Ca    9.8      2020 13:43    TPro  7.7  /  Alb  4.9  /  TBili  1.0  /  DBili  x   /  AST  34  /  ALT  22  /  AlkPhos  70  -      Urinalysis Basic - ( 2020 13:47 )    Color: Yellow / Appearance: Clear / S.025 / pH: x  Gluc: x / Ketone: NEGATIVE  / Bili: Negative / Urobili: 0.2 E.U./dL   Blood: x / Protein: NEGATIVE mg/dL / Nitrite: NEGATIVE   Leuk Esterase: NEGATIVE / RBC: x / WBC x   Sq Epi: x / Non Sq Epi: x / Bacteria: x          RADIOLOGY & ADDITIONAL TESTS:    ---------------------------------------------------------------------------  I personally reviewed: [  ]EKG   [  ]CXR    [  ] CT    [  ]Other  ---------------------------------------------------------------------------  PLEASE CHECK WHEN PRESENT:     [  ]Heart Failure     [  ] Acute     [  ] Acute on Chronic     [  ] Chronic  -------------------------------------------------------------------     [  ]Diastolic [HFpEF]     [  ]Systolic [HFrEF]     [  ]Combined [HFpEF & HFrEF]     [  ]Other:  -------------------------------------------------------------------  [  ]OLGA LIDIA     [  ]ATN     [  ]Reneal Medullary Necrosis     [  ]Renal Cortical Necrosis     [  ]Other Pathological Lesions:    [  ]CKD 1  [  ]CKD 2  [  ]CKD 3  [  ]CKD 4  [  ]CKD 5  [  ]Other  -------------------------------------------------------------------  [  ]Other/Unspecified:    --------------------------------------------------------------------    Abdominal Nutritional Status  [  ]Malnutrition: See Nutrition Note  [  ]Cachexia  [  ]Other:   [  ]Supplement Ordered:  [  ]Morbid Obesity (BMI >=40]

## 2020-01-24 LAB
ANION GAP SERPL CALC-SCNC: 14 MMOL/L — SIGNIFICANT CHANGE UP (ref 5–17)
BASOPHILS # BLD AUTO: 0.04 K/UL — SIGNIFICANT CHANGE UP (ref 0–0.2)
BASOPHILS NFR BLD AUTO: 0.7 % — SIGNIFICANT CHANGE UP (ref 0–2)
BUN SERPL-MCNC: 13 MG/DL — SIGNIFICANT CHANGE UP (ref 7–23)
CALCIUM SERPL-MCNC: 8.8 MG/DL — SIGNIFICANT CHANGE UP (ref 8.4–10.5)
CHLORIDE SERPL-SCNC: 99 MMOL/L — SIGNIFICANT CHANGE UP (ref 96–108)
CO2 SERPL-SCNC: 26 MMOL/L — SIGNIFICANT CHANGE UP (ref 22–31)
CREAT SERPL-MCNC: 1 MG/DL — SIGNIFICANT CHANGE UP (ref 0.5–1.3)
EOSINOPHIL # BLD AUTO: 0.1 K/UL — SIGNIFICANT CHANGE UP (ref 0–0.5)
EOSINOPHIL NFR BLD AUTO: 1.9 % — SIGNIFICANT CHANGE UP (ref 0–6)
GLUCOSE SERPL-MCNC: 94 MG/DL — SIGNIFICANT CHANGE UP (ref 70–99)
HCT VFR BLD CALC: 41 % — SIGNIFICANT CHANGE UP (ref 39–50)
HGB BLD-MCNC: 13.2 G/DL — SIGNIFICANT CHANGE UP (ref 13–17)
IMM GRANULOCYTES NFR BLD AUTO: 0.2 % — SIGNIFICANT CHANGE UP (ref 0–1.5)
LYMPHOCYTES # BLD AUTO: 1.96 K/UL — SIGNIFICANT CHANGE UP (ref 1–3.3)
LYMPHOCYTES # BLD AUTO: 36.6 % — SIGNIFICANT CHANGE UP (ref 13–44)
MAGNESIUM SERPL-MCNC: 2.1 MG/DL — SIGNIFICANT CHANGE UP (ref 1.6–2.6)
MCHC RBC-ENTMCNC: 27 PG — SIGNIFICANT CHANGE UP (ref 27–34)
MCHC RBC-ENTMCNC: 32.2 GM/DL — SIGNIFICANT CHANGE UP (ref 32–36)
MCV RBC AUTO: 84 FL — SIGNIFICANT CHANGE UP (ref 80–100)
MONOCYTES # BLD AUTO: 0.52 K/UL — SIGNIFICANT CHANGE UP (ref 0–0.9)
MONOCYTES NFR BLD AUTO: 9.7 % — SIGNIFICANT CHANGE UP (ref 2–14)
NEUTROPHILS # BLD AUTO: 2.72 K/UL — SIGNIFICANT CHANGE UP (ref 1.8–7.4)
NEUTROPHILS NFR BLD AUTO: 50.9 % — SIGNIFICANT CHANGE UP (ref 43–77)
NRBC # BLD: 0 /100 WBCS — SIGNIFICANT CHANGE UP (ref 0–0)
PHOSPHATE SERPL-MCNC: 4 MG/DL — SIGNIFICANT CHANGE UP (ref 2.5–4.5)
PLATELET # BLD AUTO: 216 K/UL — SIGNIFICANT CHANGE UP (ref 150–400)
POTASSIUM SERPL-MCNC: 4.1 MMOL/L — SIGNIFICANT CHANGE UP (ref 3.5–5.3)
POTASSIUM SERPL-SCNC: 4.1 MMOL/L — SIGNIFICANT CHANGE UP (ref 3.5–5.3)
RBC # BLD: 4.88 M/UL — SIGNIFICANT CHANGE UP (ref 4.2–5.8)
RBC # FLD: 14.6 % — HIGH (ref 10.3–14.5)
SODIUM SERPL-SCNC: 139 MMOL/L — SIGNIFICANT CHANGE UP (ref 135–145)
WBC # BLD: 5.35 K/UL — SIGNIFICANT CHANGE UP (ref 3.8–10.5)
WBC # FLD AUTO: 5.35 K/UL — SIGNIFICANT CHANGE UP (ref 3.8–10.5)

## 2020-01-24 PROCEDURE — 99233 SBSQ HOSP IP/OBS HIGH 50: CPT | Mod: GC

## 2020-01-24 PROCEDURE — 99222 1ST HOSP IP/OBS MODERATE 55: CPT

## 2020-01-24 RX ORDER — BENZOCAINE AND MENTHOL 5; 1 G/100ML; G/100ML
1 LIQUID ORAL
Refills: 0 | Status: DISCONTINUED | OUTPATIENT
Start: 2020-01-24 | End: 2020-01-29

## 2020-01-24 RX ORDER — SODIUM CHLORIDE 9 MG/ML
1000 INJECTION INTRAMUSCULAR; INTRAVENOUS; SUBCUTANEOUS
Refills: 0 | Status: DISCONTINUED | OUTPATIENT
Start: 2020-01-24 | End: 2020-01-26

## 2020-01-24 RX ADMIN — Medication 2 MILLIGRAM(S): at 17:44

## 2020-01-24 RX ADMIN — BUPRENORPHINE AND NALOXONE 1 FILM(S): 2; .5 TABLET SUBLINGUAL at 14:52

## 2020-01-24 RX ADMIN — BUPRENORPHINE AND NALOXONE 1 FILM(S): 2; .5 TABLET SUBLINGUAL at 05:29

## 2020-01-24 RX ADMIN — Medication 650 MILLIGRAM(S): at 03:30

## 2020-01-24 RX ADMIN — Medication 75 MILLIGRAM(S): at 17:43

## 2020-01-24 RX ADMIN — Medication 75 MILLIGRAM(S): at 05:29

## 2020-01-24 RX ADMIN — Medication 650 MILLIGRAM(S): at 22:28

## 2020-01-24 RX ADMIN — HALOPERIDOL DECANOATE 5 MILLIGRAM(S): 100 INJECTION INTRAMUSCULAR at 05:29

## 2020-01-24 RX ADMIN — MIRTAZAPINE 45 MILLIGRAM(S): 45 TABLET, ORALLY DISINTEGRATING ORAL at 22:25

## 2020-01-24 RX ADMIN — HALOPERIDOL DECANOATE 5 MILLIGRAM(S): 100 INJECTION INTRAMUSCULAR at 17:44

## 2020-01-24 RX ADMIN — BUPRENORPHINE AND NALOXONE 1 FILM(S): 2; .5 TABLET SUBLINGUAL at 22:25

## 2020-01-24 RX ADMIN — Medication 650 MILLIGRAM(S): at 02:03

## 2020-01-24 RX ADMIN — Medication 2 MILLIGRAM(S): at 05:30

## 2020-01-24 RX ADMIN — QUETIAPINE FUMARATE 100 MILLIGRAM(S): 200 TABLET, FILM COATED ORAL at 22:26

## 2020-01-24 RX ADMIN — Medication 650 MILLIGRAM(S): at 23:28

## 2020-01-24 NOTE — PROGRESS NOTE BEHAVIORAL HEALTH - NSBHFUPINTERVALHXFT_PSY_A_CORE
Pt continues to state that he feels people are taking his pictures, following him, trying to harm him. He states that he "can not take it" requesting admission to psychiatry once medically cleared. Pt continues to state that he has thoughts about stabbing self as he does not feel safe.   This writer attempted again to reach outpatient mental health providers. However, I was not able to reach anyone or leave a message. As per pt's  he has not been able to speak with outpatient providers either. PO agrees that pt will benefit from ACT services given multiple hospitalizations over the past several months.   Pt continues to state that he has thoughts about stabbing self as he does not feel safe.   As per PO pt had recent Utox which was positive for MJ in addition to prescribed benzo's and amphetamines.  Of note pt with fever overnight

## 2020-01-24 NOTE — PROGRESS NOTE ADULT - PROBLEM SELECTOR PLAN 3
Psych consulted for suicidal/homocidal ideations. Psych, plan for psych admission at [facility] however pt tested Influenza positive.   -following psych recs  -c/w Klonopin 2 mg bid  -c/w Remeron 45 mg qhs  -c/w Seroquel 100 mg qhs  -c/w Haldol 5 mg bid  -c/w Haldol 5 mg q 8 hrs prn agitation.

## 2020-01-24 NOTE — PROGRESS NOTE ADULT - SUBJECTIVE AND OBJECTIVE BOX
Patient is a 49y old  Male who presents with a chief complaint of SI/ HI , flu (2020 15:50)      INTERVAL HPI/OVERNIGHT EVENTS:    Pt. seen and examined this morning  Pt. c/o sore throat, cough, rhinorrhea  Fevers resolved  +SI, Pt. agreeable to Psych admission, pending influenza tx    Review of Systems: 12 point review of systems otherwise negative    MEDICATIONS  (STANDING):  buprenorphine 8 mG/naloxone 2 mG SL Film 1 Film(s) SubLingual every 8 hours  clonazePAM  Tablet 2 milliGRAM(s) Oral two times a day  haloperidol     Tablet 5 milliGRAM(s) Oral every 12 hours  influenza   Vaccine 0.5 milliLiter(s) IntraMuscular once  mirtazapine 45 milliGRAM(s) Oral at bedtime  oseltamivir 75 milliGRAM(s) Oral two times a day  QUEtiapine 100 milliGRAM(s) Oral at bedtime  sodium chloride 0.9%. 1000 milliLiter(s) (140 mL/Hr) IV Continuous <Continuous>    MEDICATIONS  (PRN):  acetaminophen   Tablet .. 650 milliGRAM(s) Oral every 6 hours PRN Temp greater or equal to 38C (100.4F)  haloperidol     Tablet 5 milliGRAM(s) Oral every 8 hours PRN Agitation      Allergies    Depakote (Unknown)  Dilantin (Other)  phenobarbital (Other)    Intolerances          Vital Signs Last 24 Hrs  T(C): 37.2 (2020 05:57), Max: 39.2 (2020 15:02)  T(F): 98.9 (2020 05:57), Max: 102.5 (2020 15:02)  HR: 73 (2020 05:57) (71 - 87)  BP: 93/60 (2020 05:57) (92/57 - 118/76)  BP(mean): --  RR: 17 (2020 05:57) (16 - 18)  SpO2: 95% (2020 05:57) (91% - 97%)  CAPILLARY BLOOD GLUCOSE            Physical Exam:  (this morning)  Daily     Daily   General:  non-toxic appearing in NAD  HEENT:  MMM  CV:  RRR, no JVD  Lungs:  CTA B/L  Abdomen:  soft NT ND  Extremities:  no edema B/L LE  Skin:  WWP  Neuro:  AAOx3    LABS:                        13.2   5.35  )-----------( 216      ( 2020 07:11 )             41.0     01-24    139  |  99  |  13  ----------------------------<  94  4.1   |  26  |  1.00    Ca    8.8      2020 07:11  Phos  4.0     01-24  Mg     2.1     -24        Urinalysis Basic - ( 2020 13:47 )    Color: Yellow / Appearance: Clear / S.025 / pH: x  Gluc: x / Ketone: NEGATIVE  / Bili: Negative / Urobili: 0.2 E.U./dL   Blood: x / Protein: NEGATIVE mg/dL / Nitrite: NEGATIVE   Leuk Esterase: NEGATIVE / RBC: x / WBC x   Sq Epi: x / Non Sq Epi: x / Bacteria: x          RADIOLOGY & ADDITIONAL TESTS:    ---------------------------------------------------------------------------  I personally reviewed: [  ]EKG   [  ]CXR    [  ] CT    [  ]Other  ---------------------------------------------------------------------------  PLEASE CHECK WHEN PRESENT:     [  ]Heart Failure     [  ] Acute     [  ] Acute on Chronic     [  ] Chronic  -------------------------------------------------------------------     [  ]Diastolic [HFpEF]     [  ]Systolic [HFrEF]     [  ]Combined [HFpEF & HFrEF]     [  ]Other:  -------------------------------------------------------------------  [  ]OLGA LIDIA     [  ]ATN     [  ]Reneal Medullary Necrosis     [  ]Renal Cortical Necrosis     [  ]Other Pathological Lesions:    [  ]CKD 1  [  ]CKD 2  [  ]CKD 3  [  ]CKD 4  [  ]CKD 5  [  ]Other  -------------------------------------------------------------------  [  ]Other/Unspecified:    --------------------------------------------------------------------    Abdominal Nutritional Status  [  ]Malnutrition: See Nutrition Note  [  ]Cachexia  [  ]Other:   [  ]Supplement Ordered:  [  ]Morbid Obesity (BMI >=40]

## 2020-01-24 NOTE — PROGRESS NOTE ADULT - SUBJECTIVE AND OBJECTIVE BOX
OVERNIGHT EVENTS: fever overnight, was given tylenol and ice packs    SUBJECTIVE / INTERVAL HPI: Patient seen and examined at bedside. Continues to have suicidal and homicidal ideations due to auditory hallucinations. Denies headache, chest pain, abdominal pain, diarrhea, melena, dysuria.     VITAL SIGNS:  Vital Signs Last 24 Hrs  T(C): 37.7 (24 Jan 2020 14:18), Max: 39.2 (23 Jan 2020 15:02)  T(F): 99.9 (24 Jan 2020 14:18), Max: 102.5 (23 Jan 2020 15:02)  HR: 72 (24 Jan 2020 14:18) (71 - 87)  BP: 103/64 (24 Jan 2020 14:18) (92/57 - 118/76)  BP(mean): --  RR: 17 (24 Jan 2020 14:18) (17 - 18)  SpO2: 94% (24 Jan 2020 14:18) (91% - 97%)        PHYSICAL EXAM:  General: NAD, Laying comfortably in bed, looks stated age, not agitated   HEENT: NC/AT, anicteric sclera, MMM  Neck: supple  Cardiovascular: +S1/S2, RRR, No murmurs, rubs, gallops  Respiratory: CTA B/L, no wheezing or rhonci, mild intermittent cough that is improving   Gastrointestinal: soft, NT/ND, +BSx4  Extremities: WWP, no edema, no cyanosis  Vascular: 2+ radial, DP/PT pulses B/L  Neurological: AAOx3, no focal deficits  Psychiatric: unchanged suicidal and homicidal ideations with preliminary plan; auditory hallucinations (men who are chasing him and trying to harm him, unknown who they are), no visual hallucinations     MEDICATIONS  (STANDING):  buprenorphine 8 mG/naloxone 2 mG SL Film 1 Film(s) SubLingual every 8 hours  clonazePAM  Tablet 2 milliGRAM(s) Oral two times a day  haloperidol     Tablet 5 milliGRAM(s) Oral every 12 hours  influenza   Vaccine 0.5 milliLiter(s) IntraMuscular once  mirtazapine 45 milliGRAM(s) Oral at bedtime  oseltamivir 75 milliGRAM(s) Oral two times a day  QUEtiapine 100 milliGRAM(s) Oral at bedtime  sodium chloride 0.9%. 1000 milliLiter(s) (140 mL/Hr) IV Continuous <Continuous>    MEDICATIONS  (PRN):  acetaminophen   Tablet .. 650 milliGRAM(s) Oral every 6 hours PRN Temp greater or equal to 38C (100.4F)  benzocaine 15 mG/menthol 3.6 mG (Sugar-Free) Lozenge 1 Lozenge Oral four times a day PRN Sore Throat  guaiFENesin   Syrup  (Sugar-Free) 200 milliGRAM(s) Oral every 6 hours PRN Cough  haloperidol     Tablet 5 milliGRAM(s) Oral every 8 hours PRN Agitation    Allergies    Depakote (Unknown)  Dilantin (Other)  phenobarbital (Other)    Intolerances        LABS:                        13.2   5.35  )-----------( 216      ( 24 Jan 2020 07:11 )             41.0     01-24    139  |  99  |  13  ----------------------------<  94  4.1   |  26  |  1.00    Ca    8.8      24 Jan 2020 07:11  Phos  4.0     01-24  Mg     2.1     01-24          CAPILLARY BLOOD GLUCOSE      POCT Blood Glucose.: 104 mg/dL (22 Jan 2020 20:00)        Culture - Blood (collected 01-23-20 @ 16:40)  Source: .Blood Blood  Preliminary Report (01-24-20 @ 05:01):    No growth at 12 hours        RADIOLOGY & ADDITIONAL TESTS: Reviewed.

## 2020-01-24 NOTE — PROGRESS NOTE BEHAVIORAL HEALTH - DETAILS
"fatigue" rhinorrhea Pt is unreliable historian. He reports hx/o 3 prior SA's last one month ago by OD on Seroquel

## 2020-01-24 NOTE — PROGRESS NOTE ADULT - ASSESSMENT
49 year old M, with pmh of paranoia episodes, antisocial P/D, and ASPD (self-reported hx of PTSD), recent psych admissions presenting for SI/HI ideations found to be Influenza positive, currently on tamiflu day 2 of 5

## 2020-01-25 PROCEDURE — 99233 SBSQ HOSP IP/OBS HIGH 50: CPT

## 2020-01-25 RX ORDER — HALOPERIDOL DECANOATE 100 MG/ML
7.5 INJECTION INTRAMUSCULAR EVERY 12 HOURS
Refills: 0 | Status: DISCONTINUED | OUTPATIENT
Start: 2020-01-25 | End: 2020-01-29

## 2020-01-25 RX ADMIN — QUETIAPINE FUMARATE 100 MILLIGRAM(S): 200 TABLET, FILM COATED ORAL at 21:32

## 2020-01-25 RX ADMIN — MIRTAZAPINE 45 MILLIGRAM(S): 45 TABLET, ORALLY DISINTEGRATING ORAL at 21:32

## 2020-01-25 RX ADMIN — Medication 2 MILLIGRAM(S): at 05:39

## 2020-01-25 RX ADMIN — BENZOCAINE AND MENTHOL 1 LOZENGE: 5; 1 LIQUID ORAL at 05:41

## 2020-01-25 RX ADMIN — BUPRENORPHINE AND NALOXONE 1 FILM(S): 2; .5 TABLET SUBLINGUAL at 21:32

## 2020-01-25 RX ADMIN — HALOPERIDOL DECANOATE 7.5 MILLIGRAM(S): 100 INJECTION INTRAMUSCULAR at 17:06

## 2020-01-25 RX ADMIN — Medication 75 MILLIGRAM(S): at 17:06

## 2020-01-25 RX ADMIN — Medication 2 MILLIGRAM(S): at 17:06

## 2020-01-25 RX ADMIN — BUPRENORPHINE AND NALOXONE 1 FILM(S): 2; .5 TABLET SUBLINGUAL at 13:24

## 2020-01-25 RX ADMIN — BUPRENORPHINE AND NALOXONE 1 FILM(S): 2; .5 TABLET SUBLINGUAL at 05:39

## 2020-01-25 RX ADMIN — Medication 75 MILLIGRAM(S): at 05:40

## 2020-01-25 RX ADMIN — HALOPERIDOL DECANOATE 5 MILLIGRAM(S): 100 INJECTION INTRAMUSCULAR at 05:40

## 2020-01-25 NOTE — PROGRESS NOTE ADULT - PROBLEM SELECTOR PLAN 3
Psych consulted for suicidal/homocidal ideations. Psych, plan for psych admission at [facility] however pt tested Influenza positive.   -following psych recs  -c/w Klonopin 2 mg bid  -c/w Remeron 45 mg qhs  -c/w Seroquel 100 mg qhs  -c/w Haldol 5 mg bid  -c/w Haldol 5 mg q 8 hrs prn agitation. Psych consulted for suicidal/homocidal ideations. Psych, plan for psych admission at [facility] however pt tested Influenza positive.   -following psych recs  -c/w Klonopin 2 mg bid  -c/w Remeron 45 mg qhs  -c/w Seroquel 100 mg qhs  -c/w Haldol 7.5 mg bid  -c/w Haldol 5 mg q 8 hrs prn agitation.

## 2020-01-25 NOTE — PROGRESS NOTE BEHAVIORAL HEALTH - NSBHFUPINTERVALHXFT_PSY_A_CORE
Of note pt with fever overnight, assessed in room with 1:1 present. States he doesn't understand why people are following him and just wants people to leave him alone. He reports ongoing SI and is not sure who he will tell stating that he hopes to be transferred to inpatient psychiatry once medically stable. He continues to talk about people following him and how this is distressing to him.      Spoke with RN who notes that patient demonstrated intense paranoia when thinking that his pants went missing. He was able to respond to verbal redirection.

## 2020-01-25 NOTE — PROGRESS NOTE ADULT - SUBJECTIVE AND OBJECTIVE BOX
**Incomplete Note**    INTERVAL HPI/OVERNIGHT EVENTS:  Patient was seen and examined at bedside. As per nurse and patient, no o/n events, patient resting comfortably. No complaints at this time. Patient denies: fever, chills, dizziness, weakness, HA, Changes in vision, CP, palpitations, SOB, cough, N/V/D/C, dysuria, changes in bowel movements, LE edema. ROS otherwise negative.    VITAL SIGNS:  T(F): 99.8 (01-24-20 @ 23:45)  HR: 65 (01-24-20 @ 21:12)  BP: 96/60 (01-24-20 @ 21:12)  RR: 18 (01-24-20 @ 21:12)  SpO2: 96% (01-24-20 @ 21:12)  Wt(kg): --        PHYSICAL EXAM:    Constitutional: WDWN resting comfortably in bed; NAD  HEENT: NC/AT, PERRL, EOMI, anicteric sclera, no nasal discharge; uvula midline, no oropharyngeal erythema or exudates; MMM  Neck: supple; no JVD or thyromegaly  Respiratory: CTA B/L; no W/R/R, no retractions  Cardiac: +S1/S2; RRR; no M/R/G; PMI non-displaced  Gastrointestinal: soft, NT/ND; no rebound or guarding; +BSx4  Genitourinary: normal external genitalia  Back: spine midline, no bony tenderness or step-offs; no CVAT B/L  Extremities: WWP, no clubbing or cyanosis; no peripheral edema  Musculoskeletal: NROM x4; no joint swelling, tenderness or erythema  Vascular: 2+ radial, DP/PT pulses B/L  Dermatologic: skin warm, dry and intact; no rashes, wounds, or scars  Lymphatic: no submandibular or cervical LAD  Neurologic: AAOx3; CNII-XII grossly intact; no focal deficits  Psychiatric: affect and characteristics of appearance, verbalizations, behaviors are appropriate, denies SI/HI/AH/VH    MEDICATIONS  (STANDING):  buprenorphine 8 mG/naloxone 2 mG SL Film 1 Film(s) SubLingual every 8 hours  clonazePAM  Tablet 2 milliGRAM(s) Oral two times a day  haloperidol     Tablet 5 milliGRAM(s) Oral every 12 hours  influenza   Vaccine 0.5 milliLiter(s) IntraMuscular once  mirtazapine 45 milliGRAM(s) Oral at bedtime  oseltamivir 75 milliGRAM(s) Oral two times a day  QUEtiapine 100 milliGRAM(s) Oral at bedtime  sodium chloride 0.9%. 1000 milliLiter(s) (140 mL/Hr) IV Continuous <Continuous>    MEDICATIONS  (PRN):  acetaminophen   Tablet .. 650 milliGRAM(s) Oral every 6 hours PRN Temp greater or equal to 38C (100.4F)  benzocaine 15 mG/menthol 3.6 mG (Sugar-Free) Lozenge 1 Lozenge Oral four times a day PRN Sore Throat  guaiFENesin   Syrup  (Sugar-Free) 200 milliGRAM(s) Oral every 6 hours PRN Cough  haloperidol     Tablet 5 milliGRAM(s) Oral every 8 hours PRN Agitation      Allergies    Depakote (Unknown)  Dilantin (Other)  phenobarbital (Other)    Intolerances        LABS:                        13.2   5.35  )-----------( 216      ( 24 Jan 2020 07:11 )             41.0     01-24    139  |  99  |  13  ----------------------------<  94  4.1   |  26  |  1.00    Ca    8.8      24 Jan 2020 07:11  Phos  4.0     01-24  Mg     2.1     01-24            RADIOLOGY & ADDITIONAL TESTS:  Reviewed INTERVAL HPI/OVERNIGHT EVENTS:  Patient was seen and examined at bedside. As per nurse and patient, no o/n events, patient resting comfortably. No complaints at this time. Patient denies: fever, chills, dizziness, weakness, HA, Changes in vision, CP, palpitations, SOB, cough, N/V/D/C, dysuria, changes in bowel movements, LE edema. ROS otherwise negative.    VITAL SIGNS:  T(F): 99.8 (01-24-20 @ 23:45)  HR: 65 (01-24-20 @ 21:12)  BP: 96/60 (01-24-20 @ 21:12)  RR: 18 (01-24-20 @ 21:12)  SpO2: 96% (01-24-20 @ 21:12)  Wt(kg): --        PHYSICAL EXAM:  General: NAD, Laying comfortably in bed   HEENT: NC/AT, anicteric sclera, MMM  Neck: supple  Cardiovascular: +S1/S2, RRR, No murmurs, rubs, gallops  Respiratory: CTA B/L, no wheezing or rhonchi mild intermittent cough that is improving   Gastrointestinal: soft, NT/ND, +BSx4  Extremities: WWP, no edema, no cyanosis  Vascular: 2+ radial, DP/PT pulses B/L  Neurological: AAOx3, no focal deficits  Psychiatric: unchanged suicidal and homicidal ideations with preliminary plan; auditory hallucinations (men who are chasing him and trying to harm him, unknown who they are), no visual hallucinations     MEDICATIONS  (STANDING):  buprenorphine 8 mG/naloxone 2 mG SL Film 1 Film(s) SubLingual every 8 hours  clonazePAM  Tablet 2 milliGRAM(s) Oral two times a day  haloperidol     Tablet 5 milliGRAM(s) Oral every 12 hours  influenza   Vaccine 0.5 milliLiter(s) IntraMuscular once  mirtazapine 45 milliGRAM(s) Oral at bedtime  oseltamivir 75 milliGRAM(s) Oral two times a day  QUEtiapine 100 milliGRAM(s) Oral at bedtime  sodium chloride 0.9%. 1000 milliLiter(s) (140 mL/Hr) IV Continuous <Continuous>    MEDICATIONS  (PRN):  acetaminophen   Tablet .. 650 milliGRAM(s) Oral every 6 hours PRN Temp greater or equal to 38C (100.4F)  benzocaine 15 mG/menthol 3.6 mG (Sugar-Free) Lozenge 1 Lozenge Oral four times a day PRN Sore Throat  guaiFENesin   Syrup  (Sugar-Free) 200 milliGRAM(s) Oral every 6 hours PRN Cough  haloperidol     Tablet 5 milliGRAM(s) Oral every 8 hours PRN Agitation      Allergies    Depakote (Unknown)  Dilantin (Other)  phenobarbital (Other)    Intolerances        LABS:                        13.2   5.35  )-----------( 216      ( 24 Jan 2020 07:11 )             41.0     01-24    139  |  99  |  13  ----------------------------<  94  4.1   |  26  |  1.00    Ca    8.8      24 Jan 2020 07:11  Phos  4.0     01-24  Mg     2.1     01-24            RADIOLOGY & ADDITIONAL TESTS:  Reviewed

## 2020-01-26 PROCEDURE — 99233 SBSQ HOSP IP/OBS HIGH 50: CPT | Mod: GC

## 2020-01-26 RX ADMIN — Medication 2 MILLIGRAM(S): at 06:15

## 2020-01-26 RX ADMIN — Medication 200 MILLIGRAM(S): at 14:44

## 2020-01-26 RX ADMIN — Medication 75 MILLIGRAM(S): at 17:36

## 2020-01-26 RX ADMIN — Medication 75 MILLIGRAM(S): at 06:15

## 2020-01-26 RX ADMIN — BUPRENORPHINE AND NALOXONE 1 FILM(S): 2; .5 TABLET SUBLINGUAL at 21:29

## 2020-01-26 RX ADMIN — HALOPERIDOL DECANOATE 7.5 MILLIGRAM(S): 100 INJECTION INTRAMUSCULAR at 06:15

## 2020-01-26 RX ADMIN — Medication 2 MILLIGRAM(S): at 17:34

## 2020-01-26 RX ADMIN — BUPRENORPHINE AND NALOXONE 1 FILM(S): 2; .5 TABLET SUBLINGUAL at 06:16

## 2020-01-26 RX ADMIN — HALOPERIDOL DECANOATE 7.5 MILLIGRAM(S): 100 INJECTION INTRAMUSCULAR at 17:35

## 2020-01-26 RX ADMIN — MIRTAZAPINE 45 MILLIGRAM(S): 45 TABLET, ORALLY DISINTEGRATING ORAL at 21:27

## 2020-01-26 RX ADMIN — BUPRENORPHINE AND NALOXONE 1 FILM(S): 2; .5 TABLET SUBLINGUAL at 14:35

## 2020-01-26 RX ADMIN — QUETIAPINE FUMARATE 100 MILLIGRAM(S): 200 TABLET, FILM COATED ORAL at 21:27

## 2020-01-26 NOTE — PROGRESS NOTE ADULT - SUBJECTIVE AND OBJECTIVE BOX
Patient is a 49y old  Male who presents with a chief complaint of SI/ HI , flu (25 Jan 2020 06:35)      INTERVAL HPI/OVERNIGHT EVENTS: No acute events O/N. No complaints at this time.     Review of Systems: 12 point review of systems otherwise negative      MEDICATIONS  (STANDING):  buprenorphine 8 mG/naloxone 2 mG SL Film 1 Film(s) SubLingual every 8 hours  clonazePAM  Tablet 2 milliGRAM(s) Oral two times a day  haloperidol     Tablet 7.5 milliGRAM(s) Oral every 12 hours  influenza   Vaccine 0.5 milliLiter(s) IntraMuscular once  mirtazapine 45 milliGRAM(s) Oral at bedtime  oseltamivir 75 milliGRAM(s) Oral two times a day  QUEtiapine 100 milliGRAM(s) Oral at bedtime  sodium chloride 0.9%. 1000 milliLiter(s) (140 mL/Hr) IV Continuous <Continuous>    MEDICATIONS  (PRN):  acetaminophen   Tablet .. 650 milliGRAM(s) Oral every 6 hours PRN Temp greater or equal to 38C (100.4F)  benzocaine 15 mG/menthol 3.6 mG (Sugar-Free) Lozenge 1 Lozenge Oral four times a day PRN Sore Throat  guaiFENesin   Syrup  (Sugar-Free) 200 milliGRAM(s) Oral every 6 hours PRN Cough  haloperidol     Tablet 5 milliGRAM(s) Oral every 8 hours PRN Agitation      Allergies    Depakote (Unknown)  Dilantin (Other)  phenobarbital (Other)    Intolerances          Vital Signs Last 24 Hrs  T(C): 36.9 (26 Jan 2020 14:07), Max: 37 (25 Jan 2020 20:56)  T(F): 98.4 (26 Jan 2020 14:07), Max: 98.6 (25 Jan 2020 20:56)  HR: 62 (26 Jan 2020 14:07) (58 - 62)  BP: 103/65 (26 Jan 2020 14:07) (95/60 - 103/65)  BP(mean): --  RR: 19 (26 Jan 2020 14:07) (18 - 20)  SpO2: 95% (26 Jan 2020 14:07) (95% - 98%)  CAPILLARY BLOOD GLUCOSE            Physical Exam:    Daily     Daily   General:  NAD  HEENT:  Nonicteric  CV:  RRR, no murmur, no JVD  Lungs:  CTA B/L, no wheezes, rales, rhonchi  Abdomen:  Soft, non-tender  Extremities:  No edema  Skin:  Warm and dry, no rashes  :  No altamirano  Neuro:  Nonfocal  No Restraints    LABS:

## 2020-01-26 NOTE — PROGRESS NOTE ADULT - PROBLEM SELECTOR PLAN 1
Patient meeting 1/4 SIRS criteria (T 100.9). HR 90, concerns for impending sepsis in setting of positive influenza. Patient otherwise hemodynamically stable.   -c/w tamiflu 75mg bid, for a total 5 day course  -droplet precautions  -Tylenol PRN for fever Did not meet sepsis criteria.   -c/w tamiflu 75mg bid, for a total 5 day course  -droplet precautions  -Tylenol PRN for fever

## 2020-01-26 NOTE — PROGRESS NOTE ADULT - PROBLEM SELECTOR PLAN 3
Psych consulted for suicidal/homocidal ideations. Psych, plan for psych admission at [facility] however pt tested Influenza positive.   -following psych recs  -c/w Klonopin 2 mg bid  -c/w Remeron 45 mg qhs  -c/w Seroquel 100 mg qhs  -c/w Haldol 7.5 mg bid  -c/w Haldol 5 mg q 8 hrs prn agitation.

## 2020-01-26 NOTE — PROGRESS NOTE ADULT - ASSESSMENT
49 year old M, with pmh of paranoia episodes, antisocial P/D, and ASPD (self-reported hx of PTSD), recent psych admissions presenting for SI/HI ideations found to be Influenza positive, currently on tamiflu day 2 of 5 49 year old M, with pmh of paranoia episodes, antisocial P/D, and ASPD (self-reported hx of PTSD), recent psych admissions presenting for SI/HI ideations found to be Influenza positive, currently on tamiflu day 4 of 5

## 2020-01-27 PROCEDURE — 99233 SBSQ HOSP IP/OBS HIGH 50: CPT

## 2020-01-27 PROCEDURE — 99232 SBSQ HOSP IP/OBS MODERATE 35: CPT | Mod: GC

## 2020-01-27 RX ADMIN — HALOPERIDOL DECANOATE 5 MILLIGRAM(S): 100 INJECTION INTRAMUSCULAR at 05:54

## 2020-01-27 RX ADMIN — BUPRENORPHINE AND NALOXONE 1 FILM(S): 2; .5 TABLET SUBLINGUAL at 13:53

## 2020-01-27 RX ADMIN — HALOPERIDOL DECANOATE 5 MILLIGRAM(S): 100 INJECTION INTRAMUSCULAR at 22:14

## 2020-01-27 RX ADMIN — Medication 2 MILLIGRAM(S): at 05:55

## 2020-01-27 RX ADMIN — Medication 2 MILLIGRAM(S): at 18:15

## 2020-01-27 RX ADMIN — HALOPERIDOL DECANOATE 7.5 MILLIGRAM(S): 100 INJECTION INTRAMUSCULAR at 18:16

## 2020-01-27 RX ADMIN — MIRTAZAPINE 45 MILLIGRAM(S): 45 TABLET, ORALLY DISINTEGRATING ORAL at 22:14

## 2020-01-27 RX ADMIN — QUETIAPINE FUMARATE 100 MILLIGRAM(S): 200 TABLET, FILM COATED ORAL at 22:14

## 2020-01-27 RX ADMIN — Medication 75 MILLIGRAM(S): at 05:51

## 2020-01-27 RX ADMIN — BUPRENORPHINE AND NALOXONE 1 FILM(S): 2; .5 TABLET SUBLINGUAL at 05:50

## 2020-01-27 RX ADMIN — BUPRENORPHINE AND NALOXONE 1 FILM(S): 2; .5 TABLET SUBLINGUAL at 22:14

## 2020-01-27 NOTE — DISCHARGE NOTE PROVIDER - NSDCMRMEDTOKEN_GEN_ALL_CORE_FT
Adderall 20 mg oral tablet: 1 tab(s) orally 2 times a day  Haldol 5 mg oral tablet: 1 tab(s) orally 2 times a day  KlonoPIN 2 mg oral tablet: orally 2 times a day  Remeron 45 mg oral tablet: 1 tab(s) orally once a day (at bedtime)  SEROquel 100 mg oral tablet: orally once a day  Suboxone 8 mg-2 mg sublingual film: 1 film(s) sublingual 3 times a day acetaminophen 325 mg oral tablet: 2 tab(s) orally every 6 hours, As needed, Temp greater or equal to 38C (100.4F)  buprenorphine-naloxone 8 mg-2 mg sublingual film: 1 tab(s) sublingual every 8 hours  clonazePAM 2 mg oral tablet: 1 tab(s) orally 2 times a day  guaiFENesin 100 mg/5 mL oral liquid: 10 milliliter(s) orally every 6 hours, As needed, Cough  haloperidol 0.5 mg oral tablet: 15 tab(s) orally every 12 hours  haloperidol 5 mg oral tablet: 1 tab(s) orally every 8 hours, As needed, Agitation  menthol-benzocaine 3.6 mg-15 mg mucous membrane lozenge: 1 tab(s) mucous membrane 2 times a day, As Needed  mirtazapine 45 mg oral tablet: 1 tab(s) orally once a day (at bedtime)  QUEtiapine 100 mg oral tablet: 1 tab(s) orally once a day (at bedtime) acetaminophen 325 mg oral tablet: 2 tab(s) orally every 6 hours, As needed, Temp greater or equal to 38C (100.4F)  buprenorphine-naloxone 8 mg-2 mg sublingual film: 1 tab(s) sublingual every 8 hours  clonazePAM 2 mg oral tablet: 1 tab(s) orally 2 times a day  guaiFENesin 100 mg/5 mL oral liquid: 10 milliliter(s) orally every 6 hours, As needed, Cough  haloperidol 0.5 mg oral tablet: 15 tab(s) orally every 12 hours  haloperidol 5 mg oral tablet: 1 tab(s) orally every 8 hours, As needed, Agitation  menthol-benzocaine 3.6 mg-15 mg mucous membrane lozenge: 1 tab(s) mucous membrane 2 times a day, As Needed  mirtazapine 45 mg oral tablet: 1 tab(s) orally once a day (at bedtime)  ZyPREXA 5 mg oral tablet: 1 tab(s) orally every 12 hours
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
## 2020-01-27 NOTE — PROGRESS NOTE ADULT - ASSESSMENT
49 year old M, with pmh of paranoia episodes, antisocial P/D, and ASPD (self-reported hx of PTSD), recent psych admissions presenting for SI/HI ideations found to be Influenza positive, sp tamiflu

## 2020-01-27 NOTE — DIETITIAN INITIAL EVALUATION ADULT. - OTHER INFO
50y/o male with history of paranoia episodes,ASPD presents with SI/HI ideations found to have +influenza .No N/V/D/C or pain.Skin intact.143% of IBW. Eating adequate amounts per nurse.Noted to be from shelter. Patient sleeping during RD visit.Plans for referral back to Psych unit noted.
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
## 2020-01-27 NOTE — DISCHARGE NOTE PROVIDER - CARE PROVIDER_API CALL
Inpatient Psych,   Pt to be admitted to inpatient psych  Phone: (   )    -  Fax: (   )    -  Follow Up Time: Abdomen soft, nontender, nondistended, bowel sounds present in all 4 quadrants.

## 2020-01-27 NOTE — PROGRESS NOTE ADULT - PROBLEM SELECTOR PLAN 1
Patient meeting 1/4 SIRS criteria (T 100.9). HR 90 on admission, concerns for impending sepsis in setting of positive influenza. Patient without tachycardia and fever since, hemodynamically stable.   -sp tamiflu 75mg bid  -droplet precautions  -Tylenol PRN for fever

## 2020-01-27 NOTE — DISCHARGE NOTE PROVIDER - NSDCFUADDAPPT_GEN_ALL_CORE_FT
Pt being discharge to inpatient psych facility. To be actively treated inpatient and set up for follow up as appropriate.

## 2020-01-27 NOTE — DISCHARGE NOTE PROVIDER - NSDCCPCAREPLAN_GEN_ALL_CORE_FT
PRINCIPAL DISCHARGE DIAGNOSIS  Diagnosis: Suicidal ideation  Assessment and Plan of Treatment: You endorse feelings of harming yourself and others. While you were inpatient, you were treated for the flu and kept on observation for your safety. You were seen by the psychiatrists and we continued your medications as directed. You were medically stable for inpatient admission to psychiatry.      SECONDARY DISCHARGE DIAGNOSES  Diagnosis: Influenza  Assessment and Plan of Treatment: You were found to have the flu when you came to the hospital. You were given tamiflu and remained stable while inpatient.

## 2020-01-27 NOTE — DIETITIAN INITIAL EVALUATION ADULT. - PROBLEM SELECTOR PLAN 2
seen by Psych, plan for psych admission at [facility] however pt tested Influenza positive.   Psych following recs appreciated. Pending Med rec, continue Klonopin 2 mg bid, Remeron 45 mg qhs, Seroquel 100 mg qhs, Haldol 5 mg bid. Haldol 5 mg q 8 hrs prn agitation.  Repeat EKG in AM
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
## 2020-01-27 NOTE — PROGRESS NOTE ADULT - PROBLEM SELECTOR PLAN 3
Psych consulted for suicidal/homocidal ideations. Psych, plan for psych admission at [facility] however pt tested Influenza positive.   -following psych recs  -c/w Klonopin 2 mg bid  -c/w Remeron 45 mg qhs  -c/w Seroquel 100 mg qhs  -c/w Haldol 7.5 mg bid  -c/w Haldol 5 mg q 8 hrs prn agitation.

## 2020-01-27 NOTE — DISCHARGE NOTE PROVIDER - PROVIDER TOKENS
FREE:[LAST:[Inpatient Psych],PHONE:[(   )    -],FAX:[(   )    -],ADDRESS:[Pt to be admitted to inpatient psych]]

## 2020-01-28 LAB
CULTURE RESULTS: SIGNIFICANT CHANGE UP
SPECIMEN SOURCE: SIGNIFICANT CHANGE UP

## 2020-01-28 PROCEDURE — 99233 SBSQ HOSP IP/OBS HIGH 50: CPT | Mod: GC

## 2020-01-28 PROCEDURE — 99233 SBSQ HOSP IP/OBS HIGH 50: CPT

## 2020-01-28 RX ORDER — BUPRENORPHINE AND NALOXONE 2; .5 MG/1; MG/1
1 TABLET SUBLINGUAL
Qty: 0 | Refills: 0 | DISCHARGE

## 2020-01-28 RX ORDER — HALOPERIDOL DECANOATE 100 MG/ML
1 INJECTION INTRAMUSCULAR
Qty: 0 | Refills: 0 | DISCHARGE
Start: 2020-01-28

## 2020-01-28 RX ORDER — MIRTAZAPINE 45 MG/1
1 TABLET, ORALLY DISINTEGRATING ORAL
Qty: 0 | Refills: 0 | DISCHARGE
Start: 2020-01-28

## 2020-01-28 RX ORDER — QUETIAPINE FUMARATE 200 MG/1
1 TABLET, FILM COATED ORAL
Qty: 0 | Refills: 0 | DISCHARGE
Start: 2020-01-28

## 2020-01-28 RX ORDER — MIRTAZAPINE 45 MG/1
1 TABLET, ORALLY DISINTEGRATING ORAL
Qty: 0 | Refills: 0 | DISCHARGE

## 2020-01-28 RX ORDER — OLANZAPINE 15 MG/1
5 TABLET, FILM COATED ORAL EVERY 12 HOURS
Refills: 0 | Status: DISCONTINUED | OUTPATIENT
Start: 2020-01-28 | End: 2020-01-29

## 2020-01-28 RX ORDER — ACETAMINOPHEN 500 MG
2 TABLET ORAL
Qty: 0 | Refills: 0 | DISCHARGE
Start: 2020-01-28

## 2020-01-28 RX ORDER — HALOPERIDOL DECANOATE 100 MG/ML
1 INJECTION INTRAMUSCULAR
Qty: 0 | Refills: 0 | DISCHARGE

## 2020-01-28 RX ORDER — HALOPERIDOL DECANOATE 100 MG/ML
15 INJECTION INTRAMUSCULAR
Qty: 0 | Refills: 0 | DISCHARGE
Start: 2020-01-28

## 2020-01-28 RX ORDER — CLONAZEPAM 1 MG
1 TABLET ORAL
Qty: 0 | Refills: 0 | DISCHARGE
Start: 2020-01-28

## 2020-01-28 RX ORDER — DEXTROAMPHETAMINE SACCHARATE, AMPHETAMINE ASPARTATE, DEXTROAMPHETAMINE SULFATE AND AMPHETAMINE SULFATE 1.875; 1.875; 1.875; 1.875 MG/1; MG/1; MG/1; MG/1
1 TABLET ORAL
Qty: 0 | Refills: 0 | DISCHARGE

## 2020-01-28 RX ORDER — BENZOCAINE AND MENTHOL 5; 1 G/100ML; G/100ML
1 LIQUID ORAL
Qty: 0 | Refills: 0 | DISCHARGE
Start: 2020-01-28

## 2020-01-28 RX ORDER — OLANZAPINE 15 MG/1
5 TABLET, FILM COATED ORAL ONCE
Refills: 0 | Status: COMPLETED | OUTPATIENT
Start: 2020-01-28 | End: 2020-01-28

## 2020-01-28 RX ORDER — QUETIAPINE FUMARATE 200 MG/1
0 TABLET, FILM COATED ORAL
Qty: 0 | Refills: 0 | DISCHARGE

## 2020-01-28 RX ORDER — BUPRENORPHINE AND NALOXONE 2; .5 MG/1; MG/1
1 TABLET SUBLINGUAL
Qty: 0 | Refills: 0 | DISCHARGE
Start: 2020-01-28

## 2020-01-28 RX ORDER — CLONAZEPAM 1 MG
0 TABLET ORAL
Qty: 0 | Refills: 0 | DISCHARGE

## 2020-01-28 RX ADMIN — BUPRENORPHINE AND NALOXONE 1 FILM(S): 2; .5 TABLET SUBLINGUAL at 05:37

## 2020-01-28 RX ADMIN — HALOPERIDOL DECANOATE 7.5 MILLIGRAM(S): 100 INJECTION INTRAMUSCULAR at 05:37

## 2020-01-28 RX ADMIN — HALOPERIDOL DECANOATE 7.5 MILLIGRAM(S): 100 INJECTION INTRAMUSCULAR at 17:29

## 2020-01-28 RX ADMIN — Medication 2 MILLIGRAM(S): at 17:28

## 2020-01-28 RX ADMIN — OLANZAPINE 5 MILLIGRAM(S): 15 TABLET, FILM COATED ORAL at 21:25

## 2020-01-28 RX ADMIN — OLANZAPINE 5 MILLIGRAM(S): 15 TABLET, FILM COATED ORAL at 16:13

## 2020-01-28 RX ADMIN — BUPRENORPHINE AND NALOXONE 1 FILM(S): 2; .5 TABLET SUBLINGUAL at 21:25

## 2020-01-28 RX ADMIN — BUPRENORPHINE AND NALOXONE 1 FILM(S): 2; .5 TABLET SUBLINGUAL at 14:15

## 2020-01-28 RX ADMIN — Medication 2 MILLIGRAM(S): at 05:38

## 2020-01-28 RX ADMIN — MIRTAZAPINE 45 MILLIGRAM(S): 45 TABLET, ORALLY DISINTEGRATING ORAL at 21:25

## 2020-01-28 NOTE — PROGRESS NOTE BEHAVIORAL HEALTH - NSBHCHARTREVIEWVS_PSY_A_CORE FT
Vital Signs Last 24 Hrs  T(C): 36.9 (28 Jan 2020 14:15), Max: 36.9 (27 Jan 2020 20:38)  T(F): 98.4 (28 Jan 2020 14:15), Max: 98.5 (27 Jan 2020 20:38)  HR: 56 (28 Jan 2020 14:15) (52 - 64)  BP: 102/56 (28 Jan 2020 14:15) (102/56 - 112/73)  BP(mean): --  RR: 16 (28 Jan 2020 14:15) (15 - 17)  SpO2: 94% (28 Jan 2020 14:15) (94% - 98%)
reviewed
reviewed
Vital Signs Last 24 Hrs  T(C): 37.2 (24 Jan 2020 05:57), Max: 39.2 (23 Jan 2020 15:02)  T(F): 98.9 (24 Jan 2020 05:57), Max: 102.5 (23 Jan 2020 15:02)  HR: 73 (24 Jan 2020 05:57) (71 - 87)  BP: 93/60 (24 Jan 2020 05:57) (92/57 - 118/76)  BP(mean): --  RR: 17 (24 Jan 2020 05:57) (16 - 18)  SpO2: 95% (24 Jan 2020 05:57) (91% - 97%)
ICU Vital Signs Last 24 Hrs  T(C): 36.9 (27 Jan 2020 20:38), Max: 36.9 (27 Jan 2020 20:38)  T(F): 98.5 (27 Jan 2020 20:38), Max: 98.5 (27 Jan 2020 20:38)  HR: 64 (27 Jan 2020 20:38) (53 - 65)  BP: 112/73 (27 Jan 2020 20:38) (99/63 - 112/73)  BP(mean): --  ABP: --  ABP(mean): --  RR: 15 (27 Jan 2020 20:38) (15 - 16)  SpO2: 98% (27 Jan 2020 20:38) (96% - 98%)

## 2020-01-28 NOTE — PROGRESS NOTE ADULT - PROBLEM SELECTOR PLAN 1
Patient meeting 1/4 SIRS criteria (T 100.9). HR 90 on admission, concerns for impending sepsis in setting of positive influenza. Patient without tachycardia and fever since, hemodynamically stable.   -sp tamiflu 75mg bid

## 2020-01-28 NOTE — PROGRESS NOTE ADULT - ASSESSMENT
49 year old M, with pmh of paranoia episodes, antisocial P/D, and ASPD (self-reported hx of PTSD), recent psych admissions presenting for SI/HI ideations found to be Influenza positive, sp tamiflu; medically stable and pending placement for inpatient psych
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
## 2020-01-28 NOTE — PROGRESS NOTE ADULT - ATTENDING COMMENTS
Dispo: transfer to [facility] or [facility], pending influenza tx
Dispo: transfer to [facility] or [facility], pending influenza tx
Patient calm and cooperative over his whole admission. Plan for d/c to [facility] pending bed availability. Patient still feeling deeply depressed.
F/U psych recs. Depression still a major factor. Cont tamiflu

## 2020-01-28 NOTE — PROGRESS NOTE BEHAVIORAL HEALTH - NSBHCONSULTOBSREASON_PSY_A_CORE FT
head
Continues to report suicidality with plan.
poor historian, reporting SI yesterday.
poor historian, reporting SI.
Continues to report suicidality with plan and PI.
poor historian, reporting SI yesterday.
poor historian, reporting SI yesterday.

## 2020-01-28 NOTE — PROGRESS NOTE BEHAVIORAL HEALTH - NSBHCONSULTMEDAGITATION_PSY_A_CORE FT
haldol 5 mg and ativan 2 mg PO/IM/IV q6h prn agitation

## 2020-01-28 NOTE — PROGRESS NOTE ADULT - PROBLEM SELECTOR PLAN 3
Psych consulted for suicidal/homocidal ideations. Psych, plan for psych admission at [facility] however pt tested Influenza positive.   - cw 1 to 1 observation  -following psych recs  -c/w Klonopin 2 mg bid  -c/w Remeron 45 mg qhs  -c/w Seroquel 100 mg qhs  -c/w Haldol 7.5 mg bid  -c/w Haldol 5 mg q 8 hrs prn agitation.

## 2020-01-28 NOTE — PROGRESS NOTE BEHAVIORAL HEALTH - NSBHCONSULTRECOMMENDOTHER_PSY_A_CORE FT
Currently for eventual psychiatric admission but perhaps after more monitoring pt can be discharged to outpatient treatment.
We will continue to evaluate the need for inpatient admission.

## 2020-01-28 NOTE — PROGRESS NOTE BEHAVIORAL HEALTH - NSBHCONSULTSUBSTANCEOTHER_PSY_A_CORE FT
motivational interviewing regarding K2 and stimulants

## 2020-01-28 NOTE — PROGRESS NOTE BEHAVIORAL HEALTH - NSBHCONSULTWRKUPYES_PSY_A_CORE
monitor QTc/labs/EKG
labs/EKG/monitor QTc
monitor QTc/labs/EKG
labs/EKG/monitor QTc
labs/EKG/monitor QTc
monitor QTc/labs/EKG

## 2020-01-28 NOTE — PROGRESS NOTE BEHAVIORAL HEALTH - NSBHFUPINTERVALHXFT_PSY_A_CORE
Pt seen. chart reviewed; discussed with team.   Pt continues to focus on being followed, stating "I can not take it any more". He states that he does not feel safe in the shelter system, endorsing thoughts about harming self. Pt currently denies HI. He is requesting to be started on Zyprexa which was effective in the past.   Pt continues to request psychiatric admission.     As per nursing report, pt has been “sleeping most of the time” but has been calm and cooperative when awake. Pt is now medically stable for discharge but requiring psychiatric admission.

## 2020-01-28 NOTE — PROGRESS NOTE BEHAVIORAL HEALTH - NSBHCONSULTOBS_PSY_A_CORE
abd pain, intermittent, vomiting
Constant observation

## 2020-01-28 NOTE — PROGRESS NOTE BEHAVIORAL HEALTH - NSBHCONSULTMEDS_PSY_A_CORE FT
haldol 5 mg q12h, remeron 45 mg qhs, seroquel 100 mg qhs, klonopin 2 mg bid, suboxone 8/2 daily
Continue Haldol 7.5 mg q12h (provided QTc is wnl), Remeron 45 mg qhs, Klonopin 2 mg bid, Suboxone 8mg tid daily.   Start Zyprexa 5 mg bid
continue haldol to 7.5 mg q12h (provided QTc is wnl), Remeron 45 mg qhs, Seroquel 100 mg qhs, Klonopin 2 mg bid, Suboxone 8mg tid daily
Increase haldol to 7.5 mg q12h (provided QTc is wnl), Remeron 45 mg qhs, Seroquel 100 mg qhs, Klonopin 2 mg bid, Suboxone 8mg tid daily
continue haldol to 7.5 mg q12h (provided QTc is wnl), Remeron 45 mg qhs, Seroquel 100 mg qhs, Klonopin 2 mg bid, Suboxone 8mg tid daily
continue haldol to 7.5 mg q12h (provided QTc is wnl), Remeron 45 mg qhs, Seroquel 100 mg qhs, Klonopin 2 mg bid, Suboxone 8mg tid daily

## 2020-01-28 NOTE — PROGRESS NOTE ADULT - PROBLEM SELECTOR PLAN 9
1) PCP Contacted on Admission: (Y/N) --> Name & Phone #: Dr. Genesis Snider (psychiatrist)  2) Date of Contact with PCP:  3) PCP Contacted at Discharge: (Y/N)  4) Summary of Handoff Given to PCP:   5) Post-Discharge Appointment Date and Location:

## 2020-01-29 VITALS
TEMPERATURE: 98 F | DIASTOLIC BLOOD PRESSURE: 66 MMHG | SYSTOLIC BLOOD PRESSURE: 103 MMHG | HEART RATE: 52 BPM | OXYGEN SATURATION: 97 % | RESPIRATION RATE: 17 BRPM

## 2020-01-29 DIAGNOSIS — F22 DELUSIONAL DISORDERS: ICD-10-CM

## 2020-01-29 DIAGNOSIS — Z02.9 ENCOUNTER FOR ADMINISTRATIVE EXAMINATIONS, UNSPECIFIED: ICD-10-CM

## 2020-01-29 PROBLEM — F31.9 BIPOLAR DISORDER, UNSPECIFIED: Chronic | Status: ACTIVE | Noted: 2020-01-24

## 2020-01-29 PROCEDURE — 99238 HOSP IP/OBS DSCHRG MGMT 30/<: CPT | Mod: GC

## 2020-01-29 PROCEDURE — 80048 BASIC METABOLIC PNL TOTAL CA: CPT

## 2020-01-29 PROCEDURE — 36415 COLL VENOUS BLD VENIPUNCTURE: CPT

## 2020-01-29 PROCEDURE — 83735 ASSAY OF MAGNESIUM: CPT

## 2020-01-29 PROCEDURE — 80307 DRUG TEST PRSMV CHEM ANLYZR: CPT

## 2020-01-29 PROCEDURE — 84100 ASSAY OF PHOSPHORUS: CPT

## 2020-01-29 PROCEDURE — 82962 GLUCOSE BLOOD TEST: CPT

## 2020-01-29 PROCEDURE — 87631 RESP VIRUS 3-5 TARGETS: CPT

## 2020-01-29 PROCEDURE — 71046 X-RAY EXAM CHEST 2 VIEWS: CPT

## 2020-01-29 PROCEDURE — 85025 COMPLETE CBC W/AUTO DIFF WBC: CPT

## 2020-01-29 PROCEDURE — 87040 BLOOD CULTURE FOR BACTERIA: CPT

## 2020-01-29 PROCEDURE — 99285 EMERGENCY DEPT VISIT HI MDM: CPT | Mod: 25

## 2020-01-29 PROCEDURE — 84443 ASSAY THYROID STIM HORMONE: CPT

## 2020-01-29 PROCEDURE — 93005 ELECTROCARDIOGRAM TRACING: CPT

## 2020-01-29 PROCEDURE — 99233 SBSQ HOSP IP/OBS HIGH 50: CPT

## 2020-01-29 PROCEDURE — 81003 URINALYSIS AUTO W/O SCOPE: CPT

## 2020-01-29 PROCEDURE — 80053 COMPREHEN METABOLIC PANEL: CPT

## 2020-01-29 RX ORDER — OLANZAPINE 15 MG/1
1 TABLET, FILM COATED ORAL
Qty: 0 | Refills: 0 | DISCHARGE
Start: 2020-01-29

## 2020-01-29 RX ADMIN — HALOPERIDOL DECANOATE 7.5 MILLIGRAM(S): 100 INJECTION INTRAMUSCULAR at 17:20

## 2020-01-29 RX ADMIN — Medication 2 MILLIGRAM(S): at 05:25

## 2020-01-29 RX ADMIN — HALOPERIDOL DECANOATE 7.5 MILLIGRAM(S): 100 INJECTION INTRAMUSCULAR at 05:25

## 2020-01-29 RX ADMIN — BUPRENORPHINE AND NALOXONE 1 FILM(S): 2; .5 TABLET SUBLINGUAL at 05:24

## 2020-01-29 RX ADMIN — OLANZAPINE 5 MILLIGRAM(S): 15 TABLET, FILM COATED ORAL at 10:58

## 2020-01-29 RX ADMIN — BUPRENORPHINE AND NALOXONE 1 FILM(S): 2; .5 TABLET SUBLINGUAL at 13:38

## 2020-01-29 RX ADMIN — Medication 2 MILLIGRAM(S): at 17:20

## 2020-01-29 NOTE — PROGRESS NOTE ADULT - PROBLEM SELECTOR PROBLEM 6
Attention deficit hyperactivity disorder (ADHD), unspecified ADHD type
Tobacco use
Attention deficit hyperactivity disorder (ADHD), unspecified ADHD type
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
## 2020-01-29 NOTE — PROGRESS NOTE ADULT - ASSESSMENT
49 year old M, with pmh of paranoia episodes, antisocial P/D, and ASPD (self-reported hx of PTSD), recent psych admissions presenting for SI/HI ideations found to be Influenza positive, sp tamiflu; medically stable and pending placement for inpatient psych 49 year old M, with pmh of paranoia episodes, antisocial P/D, and ASPD (self-reported hx of PTSD), recent psych admissions presenting for SI/HI ideations found to be Influenza positive, sp tamiflu treatment and now improved, asymptomatic; medically stable and pending placement for inpatient psych

## 2020-01-29 NOTE — PROGRESS NOTE BEHAVIORAL HEALTH - SUMMARY
48 y/o  M, domiciled at a shelter, unemployed, on parole, under care of [facility], single, non-caregiver, with legal history, hx/o aggressive behavior towards staff on [facility], PPhx of unspecified paranoid disorder, antisocial P/D, and self-reported hx of PTSD, heroin use in [facility], on Suboxone, self-reported three past SA's by OD, at least 4 psych admissions, admission to [facility] (12/9-3/22/2019, 10/21-11/ 6/2019; Seth 11/23/2019), presented to the ED for worsening paranoia and thoughts to stab self and people who are following him.  Continues to endorse paranoia and SI and hopelessness in context of recent release from detention, being in the the shelter system, poor ability to access outpatient care. Pt is requesting inpatient psychiatric admission, unable to contract for safety. Pt was accepted to [facility] for inpatient psychiatric care.
50 y/o  M, domiciled at a shelter, unemployed, on parole, under care of [facility], single, non-caregiver, with legal history, hx/o aggressive behavior towards staff on [facility], PPhx of unspecified paranoid disorder, antisocial P/D, and self-reported hx of PTSD, heroin use in [facility], on Suboxone, Self-reported three past SA's by OD, at least 4 psych admissions [facility] (12/9-3/22/2019, 10/21-11/ 6/2019; Seth 11/23-yesterday), presented to the ED for worsening paranoia and thoughts to stab self and people who are following him.  Continues to endorse paranoia and SI and hopelessness, requesting inpatient psychiatric admission. Continue to re-evaluate if pt requires inpatient psychiatric admission.
50 y/o  M, domiciled at a shelter, unemployed, on parole, under care of [facility], single, non-caregiver, with legal history, hx/o aggressive behavior towards staff on [facility], PPhx of unspecified paranoid disorder, antisocial P/D, and self-reported hx of PTSD, heroin use in [facility], on Suboxone, Self-reported three past SA's by OD, at least 4 psych admissions [facility] (12/9-3/22/2019, 10/21-11/ 6/2019; Seth 11/23-yesterday), presented to the ED for worsening paranoia and thoughts to stab self and people who are following him.  Continues to demonstrate paranoia and requesting inpatient admission.    As discussed with team including ANYA, pt cannot be admitted to inpt psychiatry at [facility] ([facility]) due to aggressive behavior towards staff during prior admission.  Although pt was initially accepted to [facility] when he was initially evaluated in the ED, there is not currently a bed available.    Agree with plan to continue exploring potential inpt psychiatric bed availability.    For now:    --Continue C.O.  --Cont rx regimen.
48 y/o  M, domiciled at a shelter, unemployed, on parole, under care of [facility], single, non-caregiver, with legal history, hx/o aggressive behavior towards staff on [facility], PPhx of unspecified paranoid disorder, antisocial P/D, and self-reported hx of PTSD, heroin use in [facility], on Suboxone, Self-reported three past SA's by OD, at least 4 psych admissions [facility] (12/9-3/22/2019, 10/21-11/ 6/2019; Seth 11/23-yesterday), presented to the ED for worsening paranoia and thoughts to stab self and people who are following him.    On evaluation pt appears paranoid, looking around ED, unable to contract for safety stating he is having thoughts to kill himself. His methods change for different providers. He told me he would jump in front of a train. To others he says stabbing or shooting himself. Wants inpatient admission.
48 y/o  M, domiciled at a shelter, unemployed, on parole, under care of [facility], single, non-caregiver, with legal history, hx/o aggressive behavior towards staff on [facility], PPhx of unspecified paranoid disorder, antisocial P/D, and self-reported hx of PTSD, heroin use in [facility], on Suboxone, Self-reported three past SA's by OD, at least 4 psych admissions [facility] (12/9-3/22/2019, 10/21-11/ 6/2019; Seth 11/23-yesterday), presented to the ED for worsening paranoia and thoughts to stab self and people who are following him.  Continues to demonstrate paranoia and requesting inpatient admission.
50 y/o  M, domiciled at a shelter, unemployed, on parole, under care of [facility], single, non-caregiver, with legal history, hx/o aggressive behavior towards staff on [facility], PPhx of unspecified paranoid disorder, antisocial P/D, and self-reported hx of PTSD, heroin use in [facility], on Suboxone, Self-reported three past SA's by OD, at least 4 psych admissions [facility] (12/9-3/22/2019, 10/21-11/ 6/2019; Seth 11/23-yesterday), presented to the ED for worsening paranoia and thoughts to stab self and people who are following him.  Continues to demonstrate paranoia and requesting inpatient admission.
50 y/o  M, domiciled at a shelter, unemployed, on parole, under care of [facility], single, non-caregiver, with legal history, hx/o aggressive behavior towards staff on [facility], PPhx of unspecified paranoid disorder, antisocial P/D, and self-reported hx of PTSD, heroin use in [facility], on Suboxone, Self-reported three past SA's by OD, at least 4 psych admissions [facility] (12/9-3/22/2019, 10/21-11/ 6/2019; Seth 11/23-yesterday), presented to the ED for worsening paranoia and thoughts to stab self and people who are following him.    1/22On evaluation pt appears paranoid, looking around ED, unable to contract for safety stating he is having thoughts to kill himself.   1/23His methods change for different providers. He stated he would jump in front of a train to one provider. To others he says stabbing or shooting himself. Wants inpatient admission.  1/24. Pt continues to endorse PI/SI with different plans. He is unable to contract for safety.   Pt is requesting voluntary psychiatric hospitalization.
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
## 2020-01-29 NOTE — PROGRESS NOTE ADULT - REASON FOR ADMISSION
SI/ HI , flu
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
## 2020-01-29 NOTE — PROGRESS NOTE BEHAVIORAL HEALTH - NSBHATTESTSEENBY_PSY_A_CORE
attending Psychiatrist without NP/Trainee
attending Psychiatrist without NP/Trainee
Attending Psychiatrist supervising NP/Trainee, meeting pt...
attending Psychiatrist without NP/Trainee
Trainee with telephonic supervision from Attending Psychiatrist
attending Psychiatrist without NP/Trainee
attending Psychiatrist without NP/Trainee

## 2020-01-29 NOTE — PROGRESS NOTE ADULT - PROBLEM SELECTOR PROBLEM 2
Influenza
Suicidal ideation
Influenza
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
## 2020-01-29 NOTE — PROGRESS NOTE ADULT - PROBLEM SELECTOR PROBLEM 1
Discharge planning issues
Influenza A
Influenza A
SIRS (systemic inflammatory response syndrome)
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
## 2020-01-29 NOTE — PROGRESS NOTE BEHAVIORAL HEALTH - NSBHADMITCOUNSEL_PSY_A_CORE
prognosis/instructions for management, treatment and follow up/importance of adherence to chosen treatment/Dispo planning/risk factor reduction/client/family/caregiver education/diagnostic results/impressions and/or recommended studies/risks and benefits of treatment options
other.../instructions for management, treatment and follow up/risk factor reduction/client/family/caregiver education/diagnostic results/impressions and/or recommended studies/risks and benefits of treatment options/importance of adherence to chosen treatment/prognosis
importance of adherence to chosen treatment/Dispo planning/risks and benefits of treatment options/risk factor reduction/prognosis/instructions for management, treatment and follow up/client/family/caregiver education/diagnostic results/impressions and/or recommended studies
diagnostic results/impressions and/or recommended studies/risk factor reduction/instructions for management, treatment and follow up/risks and benefits of treatment options
instructions for management, treatment and follow up/risk factor reduction/other.../diagnostic results/impressions and/or recommended studies/risks and benefits of treatment options/importance of adherence to chosen treatment/client/family/caregiver education/prognosis
importance of adherence to chosen treatment/client/family/caregiver education/Dispo planning/risks and benefits of treatment options/instructions for management, treatment and follow up/risk factor reduction/prognosis/diagnostic results/impressions and/or recommended studies

## 2020-01-29 NOTE — PROGRESS NOTE BEHAVIORAL HEALTH - NSBHADMITCOORDWITH_PSY_A_CORE
medical staff/social work
medical staff/social work
social work/medical staff
medical staff/social work
social work/medical staff
social work/medical staff

## 2020-01-29 NOTE — PROGRESS NOTE BEHAVIORAL HEALTH - PROBLEM SELECTOR PLAN 3
As per  pt with recent tox pos for MJ, hx/o K2 use.   Continue Suboxone 8 mg tid.

## 2020-01-29 NOTE — PROGRESS NOTE ADULT - PROBLEM SELECTOR PLAN 8
1) PCP Contacted on Admission: (Y/N) --> Name & Phone #: Dr. Genesis Snider (psychiatrist)  2) Date of Contact with PCP:  3) PCP Contacted at Discharge: (Y/N)  4) Summary of Handoff Given to PCP:   5) Post-Discharge Appointment Date and Location:
Regular diet     Full code   no DVT prophylaxis needed

## 2020-01-29 NOTE — DISCHARGE NOTE NURSING/CASE MANAGEMENT/SOCIAL WORK - PATIENT PORTAL LINK FT
You can access the FollowMyHealth Patient Portal offered by [facility] by registering at the following website: [email or web address]. By joining [facility]’s FollowMyHealth portal, you will also be able to view your health information using other applications (apps) compatible with our system.

## 2020-01-29 NOTE — PROGRESS NOTE BEHAVIORAL HEALTH - NSBHFUPSUICINTERVALFT_PSY_A_CORE
Pt states that he can not cope with being followed
It is remains unclear if suicidality is conditional as pt is clearly seeking admission.   He however has hx/o admission to [facility] for three months with plan at the [facility] ([facility]).
Multiple plans to harm self but little intent in my opinion. Suicidality seems very conditional (e.g. if admitted to psych unit, would not be suicidal).,
It is remains unclear if suicidality is conditional as pt is clearly seeking admission.   He however has hx/o admission to [facility] for three months with plan at the time to [facility] (pt was rejected).
It is remains unclear if suicidality is conditional as pt is clearly seeking admission.   He however has hx/o admission to [facility] for three months with plan at the time to [facility] hospital (pt was rejected).
It is remains unclear if suicidality is conditional as pt is clearly seeking admission.   He however has hx/o admission to [facility] for three months with plan at the time to [facility] (pt was rejected).
It is unclear if suicidality is conditional as pt is clearly seeking admission.   He however has hx/o admission to [facility] for three months with plan at the time to [facility] ([facility] was rejected).

## 2020-01-29 NOTE — PROGRESS NOTE BEHAVIORAL HEALTH - OTHER
paranoid ideation
paranoid ideation about being targeted. Feels that people "hunters" are targeting him with intent to harm him
paranoid ideation

## 2020-01-29 NOTE — PROGRESS NOTE BEHAVIORAL HEALTH - PRIMARY DX
Paranoid ideation
Psychosis, unspecified psychosis type

## 2020-01-29 NOTE — PROGRESS NOTE BEHAVIORAL HEALTH - PROBLEM SELECTOR PROBLEM 1
Psychosis, unspecified psychosis type

## 2020-01-29 NOTE — PROGRESS NOTE ADULT - PROBLEM SELECTOR PROBLEM 5
Attention deficit hyperactivity disorder (ADHD), unspecified ADHD type
Substance abuse

## 2020-01-29 NOTE — PROGRESS NOTE ADULT - PROBLEM SELECTOR PROBLEM 3
Homicidal ideation
Psychosis, unspecified psychosis type
Substance abuse
Suicidal ideation

## 2020-01-29 NOTE — PROGRESS NOTE ADULT - PROBLEM SELECTOR PLAN 2
Management as above  -continue supportive care
Psych consulted for suicidal/homocidal ideations. Psych, plan for psych admission at [facility] however pt tested Influenza positive.   - cw 1 to 1 observation  -following psych recs  -c/w Klonopin 2 mg bid  -c/w Remeron 45 mg qhs  -c/w Seroquel 100 mg qhs  -c/w Haldol 7.5 mg bid  -c/w Haldol 5 mg q 8 hrs prn agitation.
cont. mgmt per Psych, on constant observation
cont. mgmt per Psych, on constant observation
resolved
Management as above  -continue supportive care

## 2020-01-29 NOTE — PROGRESS NOTE BEHAVIORAL HEALTH - PROBLEM SELECTOR PLAN 2
Pt continue to endorse SI without specific plan today.   Continue Remeron 45 mg qhs. Klonopin 2 mg bid  Continue CO at this time  Transfer to [facility] for further stabilization/safety.  Contact outpatient psychiatric NP to discuss contraindications of Adderall. This writer was unable to reach provider despite of several attempts.
Pt continue to endorse SI without specific plan today.   Continue CO at this time
Pt now denies SI
Pt continues to endorse SI with various plans. Unclear if pt's suicidality is conditional. However given pt's multiple risks for self harm and recent HI will continue CO at this time.
Pt now denies SI
Pt now denies SI

## 2020-01-29 NOTE — PROGRESS NOTE ADULT - PROBLEM SELECTOR PLAN 7
Patient advised cessation  -hold off on nicotine patch  in setting of psych sxs
Regular diet     Full code   no DVT prophylaxis needed
Patient advised cessation  -hold off on nicotine patch  in setting of psych sxs

## 2020-01-29 NOTE — PROGRESS NOTE ADULT - PROBLEM SELECTOR PLAN 6
Patient advised cessation  -hold off on nicotine patch  in setting of psych sxs
history of ADHD  -holding Adderall , in setting of psych sxs

## 2020-01-29 NOTE — PROGRESS NOTE ADULT - PROBLEM SELECTOR PLAN 5
history of ADHD  -holding Adderall , in setting of psych sxs
hx of substance abuse on suboxone. On 8mg tid  - Suboxone dose confirmed on I-STOP  -c/w Suboxone
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
## 2020-01-29 NOTE — PROGRESS NOTE BEHAVIORAL HEALTH - NSBHFUPINTERVALCCFT_PSY_A_CORE
"I'm being followed by a witch hunt."
"I'm being followed"
"I'm doing ok."
"I'm okay this morning"
48 y/o  M, domiciled at a shelter, unemployed, on parole, under care of [facility], single, non-caregiver, with legal history, hx/o aggressive behavior towards staff on [facility], PPhx of unspecified paranoid disorder, antisocial P/D, and self-reported hx of PTSD, heroin use in [facility], on Suboxone, Self-reported three past SA's by OD, at least 4 psych admissions [facility] (12/9-3/22/2019, 10/21-11/ 6/2019; Seth 11/23-yesterday), presented to the ED for worsening paranoia and thoughts to stab self and people who are following him.    On evaluation pt appears paranoid, looking around ED, unable to contract for safety stating he is having thoughts to kill himself. His methods change for different providers. He told me he would jump in front of a train. To others he says stabbing or shooting himself. Wants inpatient admission.
50 y/o  M, domiciled at a shelter, unemployed, on parole, under care of [facility], single, non-caregiver, with legal history, hx/o aggressive behavior towards staff on [facility], PPhx of unspecified paranoid disorder, antisocial P/D, and self-reported hx of PTSD, heroin use in [facility], on Suboxone, Self-reported three past SA's by OD, at least 4 psych admissions [facility] (12/9-3/22/2019, 10/21-11/ 6/2019; Seth 11/23-yesterday), presented to the ED for worsening paranoia and thoughts to stab self and people who are following him.    On evaluation pt appears paranoid, looking around ED, unable to contract for safety stating he is having thoughts to kill himself. His methods change for different providers. He told me he would jump in front of a train. To others he says stabbing or shooting himself. Wants inpatient admission.
Pt continues to endorse PI and SI

## 2020-01-29 NOTE — PROGRESS NOTE BEHAVIORAL HEALTH - NSBHFUPREASONCONS_PSY_A_CORE
depression/suicidality/other substance/psychosis/med management
psychosis/depression/med management/suicidality/other substance
psychosis/med management/suicidality/depression/other substance
psychosis/suicidality
psychosis/suicidality/med management/other substance/depression
suicidality/depression/med management/other substance/psychosis
psychosis/depression/med management/other substance/suicidality

## 2020-01-29 NOTE — PROGRESS NOTE ADULT - PROBLEM SELECTOR PROBLEM 7
Nutrition, metabolism, and development symptoms
Tobacco use

## 2020-01-29 NOTE — PROGRESS NOTE BEHAVIORAL HEALTH - THOUGHT CONTENT
Preoccupations/Other
Hopelessness/Other/Suicidality/Preoccupations
Hopelessness/Suicidality/Other/Preoccupations
Suicidality/Homicidality
Other/Preoccupations
Preoccupations/Other
Suicidality/Homicidality

## 2020-01-29 NOTE — PROGRESS NOTE ADULT - PROBLEM SELECTOR PROBLEM 8
Nutrition, metabolism, and development symptoms
Transition of care performed with sharing of clinical summary
Nutrition, metabolism, and development symptoms

## 2020-01-29 NOTE — PROGRESS NOTE ADULT - PROBLEM SELECTOR PLAN 4
cont. Suboxone, confirmed on I-STOP
hx of substance abuse on suboxone. On 8mg tid  - Suboxone dose confirmed on I-STOP  -c/w Suboxone
see above
see above  continue to monitor
see above

## 2020-01-29 NOTE — PROGRESS NOTE ADULT - PROBLEM SELECTOR PROBLEM 4
Homicidal ideation
Substance abuse
Substance abuse
Homicidal ideation

## 2020-01-29 NOTE — DISCHARGE NOTE NURSING/CASE MANAGEMENT/SOCIAL WORK - NSDCCRNAME_GEN_ALL_CORE_FT
Discharge to [facility] [address] [facility] [address]  [facility] set for 5:00 pm

## 2020-02-05 DIAGNOSIS — R45.850 HOMICIDAL IDEATIONS: ICD-10-CM

## 2020-02-05 DIAGNOSIS — Z88.8 ALLERGY STATUS TO OTHER DRUGS, MEDICAMENTS AND BIOLOGICAL SUBSTANCES STATUS: ICD-10-CM

## 2020-02-05 DIAGNOSIS — J10.89 INFLUENZA DUE TO OTHER IDENTIFIED INFLUENZA VIRUS WITH OTHER MANIFESTATIONS: ICD-10-CM

## 2020-02-05 DIAGNOSIS — R50.9 FEVER, UNSPECIFIED: ICD-10-CM

## 2020-02-05 DIAGNOSIS — F25.9 SCHIZOAFFECTIVE DISORDER, UNSPECIFIED: ICD-10-CM

## 2020-02-05 DIAGNOSIS — F90.9 ATTENTION-DEFICIT HYPERACTIVITY DISORDER, UNSPECIFIED TYPE: ICD-10-CM

## 2020-02-05 DIAGNOSIS — F60.2 ANTISOCIAL PERSONALITY DISORDER: ICD-10-CM

## 2020-02-05 DIAGNOSIS — Z56.0 UNEMPLOYMENT, UNSPECIFIED: ICD-10-CM

## 2020-02-05 DIAGNOSIS — R45.851 SUICIDAL IDEATIONS: ICD-10-CM

## 2020-02-05 DIAGNOSIS — F11.20 OPIOID DEPENDENCE, UNCOMPLICATED: ICD-10-CM

## 2020-02-05 DIAGNOSIS — F31.9 BIPOLAR DISORDER, UNSPECIFIED: ICD-10-CM

## 2020-02-05 DIAGNOSIS — F29 UNSPECIFIED PSYCHOSIS NOT DUE TO A SUBSTANCE OR KNOWN PHYSIOLOGICAL CONDITION: ICD-10-CM

## 2020-02-05 DIAGNOSIS — J10.1 INFLUENZA DUE TO OTHER IDENTIFIED INFLUENZA VIRUS WITH OTHER RESPIRATORY MANIFESTATIONS: ICD-10-CM

## 2020-02-05 DIAGNOSIS — F60.0 PARANOID PERSONALITY DISORDER: ICD-10-CM

## 2020-02-05 DIAGNOSIS — Z72.0 TOBACCO USE: ICD-10-CM

## 2020-02-05 SDOH — ECONOMIC STABILITY - INCOME SECURITY: UNEMPLOYMENT, UNSPECIFIED: Z56.0

## 2020-07-13 NOTE — ED ADULT NURSE NOTE - NS TRANSFER PATIENT BELONGINGS
[FreeTextEntry1] : Procedure: Microscopic Ear Exam\par \par Left ear:  \par Ear canal intact without inflammation or lesion.  \par Tympanic membrane intact without inflammation.\par \par \par Right ear:  \par Ear canal intact without inflammation or lesion.  \par The tympanic membrane appeared to be in normal position. Partially replaced by cartilage. Inferior tympanic membrane perforation, 15%. No associated inflammation. [Normal] : no rashes Clothing

## 2020-10-19 NOTE — PROGRESS NOTE BEHAVIORAL HEALTH - NS ED B[facility] MED ROS MUSCULOSKELETAL
No complaints Drysol Pregnancy And Lactation Text: This medication is considered safe during pregnancy and breast feeding.

## 2021-05-26 NOTE — ED BEHAVIORAL HEALTH ASSESSMENT NOTE - TIME CONSULT PERFORMED
22-Jan-2000 15:22
Detail Level: Detailed
Add 07608 Cpt? (Important Note: In 2017 The Use Of 07269 Is Being Tracked By Cms To Determine Future Global Period Reimbursement For Global Periods): yes

## 2021-06-16 NOTE — ED BEHAVIORAL HEALTH ASSESSMENT NOTE - INSIGHT (REGARDING PSYCHIATRIC ILLNESS)
Called Ellie (Mom) to review thread below-    Mom reports he has been off the omeprazole since Monday. Reviewed ok to take tums as needed, follow instructions on the bottle. Mom verbalized understanding, denies further questions/concerns at this time    LEELEE Marte, RN    ----- Message from FAHAD Zavala CNP sent at 6/16/2021  2:52 PM CDT -----  Regarding: RE: Med Question  Patricia you can tell them Tums are fine   Camilo  ----- Message -----  From: Alicia Skelton  Sent: 6/16/2021   2:34 PM CDT  To: FAHAD Zavala CNP, #  Subject: Med Question                                     Hi - This pt is scheduled for his ph study on 6/28. They started tapering off his Omeprazole last week. Mom called and said he's having heart burn more frequently and want to make sure they can give him Tums. Could you give mom a call to discuss?    Thanks!  Alicia        
Fair

## 2021-08-19 NOTE — PROGRESS NOTE BEHAVIORAL HEALTH - NSBHADMITIPDSM_PSY_A_CORE
Induction of labor-Medicinal/Augmentation of labor Induction of labor-Medicinal/Augmentation of labor/External electronic FM see above for Axis I, II, III

## 2022-06-02 NOTE — PROGRESS NOTE BEHAVIORAL HEALTH - RECENT MEMORY
Discharge Planning Assessment  [facility]     Patient Name: Juanito Hawkins  MRN: [id number]  Today's Date: 6/2/2022    Admit Date: 6/1/2022     Discharge Needs Assessment     Row Name 06/02/22 1041       Living Environment    Current Living Arrangements correction[facility] facility    Duration at Residence [facility]       Discharge Needs Assessment    Discharge Coordination/Progress patient is on the vent and is resident of [facility] will follow               Discharge Plan    No documentation.               Continued Care and Services - Admitted Since 6/1/2022    Coordination has not been started for this encounter.          Demographic Summary    No documentation.                Functional Status    No documentation.                Psychosocial    No documentation.                Abuse/Neglect    No documentation.                Legal    No documentation.                Substance Abuse    No documentation.                Patient Forms    No documentation.                   Spring Mast RN    
Normal

## 2022-07-19 NOTE — PROGRESS NOTE ADULT - ASSESSMENT
Patient Education     Eating Heart-Healthy Foods  Eating has a big impact on your heart health. In fact, eating healthier can improve several of your heart risks at once. For instance, it helps you manage weight, cholesterol, and blood pressure. Here are ideas to help you make heart-healthy changes without giving up all the foods and flavors you love.  Getting started  Talk with your healthcare provider about eating plans, such as the DASH or Mediterranean diet. You may also be referred to a dietitian.  Change a few things at a time. Give yourself time to get used to a few eating changes before adding more.  Work to create a tasty, healthy eating plan that you can stick to for the rest of your life.    Goals for healthy eating  Below are some tips to improve your eating habits:  Limit saturated fats and trans fats. Saturated fats raise your levels of cholesterol, so keep these fats to a minimum. They are found in foods such as fatty meats, whole milk, cheese, and palm and coconut oils. Avoid trans fats because they lower good cholesterol as well as raise bad cholesterol. Trans fats are most often found in processed foods.  Reduce sodium (salt) intake. Eating too much salt may increase your blood pressure. Limit your sodium intake to 2,300 milligrams (mg) per day (the amount in 1 teaspoon of salt), or less if your healthcare provider recommends it. Dining out less often and eating fewer processed foods are two great ways to decrease the amount of salt you consume.  Managing calories. A calorie is a unit of energy. Your body burns calories for fuel, but if you eat more calories than your body burns, the extras are stored as fat. Your healthcare provider can help you create a diet plan to manage your calories. This will likely include eating healthier foods as well as exercising regularly. To help you track your progress, keep a diary to record what you eat and how often you exercise.  Choose the right foods  Aim to  make these foods staples of your diet. If you have diabetes, you may have different recommendations than what is listed here:  Fruits and vegetables provide plenty of nutrients without a lot of calories. At meals, fill half your plate with these foods. Split the other half of your plate between whole grains and lean protein.  Whole grains are high in fiber and rich in vitamins and nutrients. Good choices include whole-wheat bread, pasta, and brown rice.  Lean proteins give you nutrition with less fat. Good choices include fish, skinless chicken, and beans.  Low-fat or nonfat dairy provides nutrients without a lot of fat. Try low-fat or nonfat milk, cheese, or yogurt.  Healthy fats can be good for you in small amounts. These are unsaturated fats, such as olive oil, nuts, and fish. Try to have at least 2 servings per week of fatty fish, such as salmon, sardines, mackerel, rainbow trout, and albacore tuna. These contain omega-3 fatty acids, which are good for your heart. Flaxseed is another source of a heart-healthy fat.  More on heart-healthy eating  Read food labels  Healthy eating starts at the grocery store. Be sure to pay attention to food labels on packaged foods. Look for products that are high in fiber and protein, and low in saturated fat, cholesterol, and sodium. Avoid products that contain trans fat. And pay close attention to serving size. For instance, if you plan to eat two servings, double all the numbers on the label.  Prepare food right  A key part of healthy cooking is cutting down on added fat and salt. Look on the internet for lower-fat, lower-sodium recipes. Also, try these tips:  Remove fat from meat and skin from poultry before cooking.  Skim fat from the surface of soups and sauces.  Broil, boil, bake, steam, grill, and microwave food without added fats.  Choose ingredients that spice up your food without adding calories, fat, or sodium. Try these items: horseradish, hot sauce, lemon, mustard,  nonfat salad dressings, and vinegar. For salt-free herbs and spices, try basil, cilantro, cinnamon, pepper, and rosemary.  Date Last Reviewed: 10/1/2017  © 7794-3005 [employer]. [address] [place] [address]. All rights reserved. This information is not intended as a substitute for professional medical care. Always follow your healthcare professional's instructions.            49 year old M, with pmh of paranoia episodes, antisocial P/D, and ASPD (self-reported hx of PTSD), recent psych admissions presenting for SI/HI ideations found to be Influenza positive, currently on tamiflu

## 2022-07-20 NOTE — PROGRESS NOTE BEHAVIORAL HEALTH - NSBHATTESTSEENBY_PSY_A_CORE
Last Visit Date: 6/29/2022   Next Visit Date: 9/29/2022
Trainee with telephonic supervision from Attending Psychiatrist
attending Psychiatrist without NP/Trainee
Trainee with telephonic supervision from Attending Psychiatrist
attending Psychiatrist without NP/Trainee

## 2022-09-12 NOTE — PROGRESS NOTE BEHAVIORAL HEALTH - NSBHCHARTREVIEWVS_PSY_A_CORE FT
72year-old female with a PMH of gastritis with hemorrhage and chronic constipation presented with 2-day h/o epigastric discomfort and bloating with nausea, dry heaving, belching, poor appetite, and decreased stool volume  Presenting symptoms are consistent with dyspepsia +/- gastritis likely exacerbated by constipation with low suspicion for PUD  · Last EGD from 06/2021 with evidence of erosive gastritis and hemorrhage with unclear etiology  No ulcer noted and biopsy results were negative for H  Pylori  Symptoms improved with daily PPI therapy which was then discontinue as of 12/2021  · CT A/P with contrast on admission with no acute findings  Mildly decreased bowel sounds with no tenderness noted on abdominal exam    · Patient reports somewhat improved symptoms since admission  Outpatient vs inpatient EGD was offered and patient opting to proceed with inpatient EGD       Recommendations:  · Monitor symptoms clinically   · Continue Protonix 40 mg daily   · Would need to continue bowel regimen as below to optimize BMs   · No indications for urgent EGD -  Will plan for EGD tomorrow to r/o other causes including neoplasm    · Advance diet as tolerated - NPO starting midnight Vital Signs Last 24 Hrs  T(C): 36.9 (01 Nov 2019 16:56), Max: 37.4 (01 Nov 2019 09:30)  T(F): 98.5 (01 Nov 2019 16:56), Max: 99.3 (01 Nov 2019 09:30)  HR: 56 (01 Nov 2019 16:56) (56 - 70)  BP: 127/86 (01 Nov 2019 16:56) (127/86 - 131/83)  BP(mean): --  RR: 18 (01 Nov 2019 16:56) (18 - 18)  SpO2: 98% (01 Nov 2019 16:56) (95% - 98%)

## 2022-10-14 NOTE — PROGRESS NOTE BEHAVIORAL HEALTH - NSBHFUPINTERVALHXFT_PSY_A_CORE
Per staff, pt is visible, in good behavioral control, .  continues  visible on the unit, socializing with peers, asks about knee xray, stating that he has had discomfort for over a year, no acute changes in pain.  No evidence of pt responding to internal stimuli.  Attends select groups. No evidence of irritability.  No acute suicidality at this time. +paranoid ideation  Plan for long-term hospitalization in view of fragility and chronic paranoid ideation and depressed mood  with significant anxiety. Glycopyrrolate Pregnancy And Lactation Text: This medication is Pregnancy Category B and is considered safe during pregnancy. It is unknown if it is excreted breast milk.

## 2023-05-24 NOTE — PROGRESS NOTE BEHAVIORAL HEALTH - ABNORMAL MOVEMENTS
[facility]  Surgery Department  Operative Note    SUMMARY     Date of Procedure: 5/24/2023     Procedure:  Glenohumeral debridement extensive right shoulder    Surgeon(s) and Role:     * Curtis Ricardo MD - Primary    Assisting Surgeon:  Jose    Pre-Operative Diagnosis: Traumatic complete tear of right rotator cuff, subsequent encounter [S46.011D]    Post-Operative Diagnosis: Post-Op Diagnosis Codes:     * Traumatic complete tear of right rotator cuff, subsequent encounter [S46.011D]    Anesthesia: * No anesthesia type entered *    Intraoperative Findings:  The glenohumeral joint was noted to have grade 3 and some early grade 4 on the humeral head.  The glenoid demonstrated grade 3 chondromalacia.  The labrum was frayed.  The biceps anchor was flattened.  There was a lot of thick tissue in the subacromial space.  There was a massive rotator cuff tear which was retracted beyond the glenoid and adhesed down to the glenoid.  There was a significant Hill-Sachs lesion    Description of the Findings of the Procedure:  Patient was placed on the operative table in the lateral decubitus position.  She was prepped and draped in the usual sterile manner for surgery.  Standard posterior arthroscopic portal was established and diagnostic arthroscopy was undertaken the findings of those stated above.  At this point I used a shaver extensively debrided the labrum the biceps anchor the rotator cuff remnant and the subacromial space bursa.  When this was complete I study the rotator cuff extensively.  I put multiple stitches across its edge and potential on an attempt to release the rotator cuff.  It is simply did not have any excursion to it and the tissue was extraordinarily poor quality.  It was clearly a repairable.  At this point I simply removed my stitches.  The portals were closed with nylon stitches and sterile dressings were applied.  The plan for this patient will be to rehab her deltoid.  If she has  persistent discomfort she will require reverse total shoulder    Complications: No    Estimated Blood Loss (EBL): * No values recorded between 5/24/2023  9:03 AM and 5/24/2023  9:19 AM *           Implants: * No implants in log *    Specimens:   Specimen (24h ago, onward)      None                    Condition: Good    Disposition: PACU - hemodynamically stable.              Discharge Note    SUMMARY     Admit Date: 5/24/2023    Discharge Date and Time:  05/24/2023 9:19 AM    Hospital Course (synopsis of major diagnoses, care, treatment, and services provided during the course of the hospital stay): Uneventful      Final Diagnosis: Post-Op Diagnosis Codes:     * Traumatic complete tear of right rotator cuff, subsequent encounter [S46.011D]    Disposition: Home or Self Care    Follow Up/Patient Instructions:     Medications:  Reconciled Home Medications:   Current Discharge Medication List        CONTINUE these medications which have NOT CHANGED    Details   b complex vitamins capsule Take 1 capsule by mouth once daily.      furosemide (LASIX) 20 MG tablet Take 1 tablet (20 mg total) by mouth daily as needed.  Qty: 90 tablet, Refills: 1    Associated Diagnoses: Physical exam      HYDROcodone-acetaminophen (NORCO) 7.5-325 mg per tablet Take 1 tablet by mouth every 6 (six) hours as needed for Pain (Take as directed for postoperative pain.).  Qty: 28 tablet, Refills: 0    Associated Diagnoses: Traumatic complete tear of right rotator cuff, subsequent encounter      LYRICA 200 mg Cap Take 1 capsule (200 mg total) by mouth 3 (three) times daily.  Qty: 270 capsule, Refills: 1      mupirocin (BACTROBAN) 2 % ointment       pantoprazole (PROTONIX) 40 MG tablet Take 1 tablet (40 mg total) by mouth once daily.  Qty: 90 tablet, Refills: 3    Associated Diagnoses: Gastroesophageal reflux disease, unspecified whether esophagitis present      traZODone (DESYREL) 50 MG tablet Take 2 tablets (100 mg total) by mouth every  evening.  Qty: 180 tablet, Refills: 0    Associated Diagnoses: Insomnia, unspecified type; Physical exam      venlafaxine (EFFEXOR-XR) 150 MG Cp24 Take 1 capsule (150 mg total) by mouth once daily.  Qty: 90 capsule, Refills: 1      ascorbic acid, vitamin C, (VITAMIN C) 500 MG tablet Take 500 mg by mouth once daily.      atomoxetine (STRATTERA) 40 MG capsule Take 1 capsule (40 mg total) by mouth once daily.  Qty: 30 capsule, Refills: 0      calcium carbonate (OS-JULIANNE) 600 mg calcium (1,500 mg) Tab Take 600 mg by mouth once.      docusate sodium (COLACE) 100 MG capsule Take 1 capsule (100 mg total) by mouth once daily.  Qty: 90 capsule, Refills: 0    Associated Diagnoses: Constipation, unspecified constipation type      glucosamine-chondroitin 250-200 mg Tab Take 2 tablets by mouth once daily.      magnesium 250 mg Tab Take 250 mg by mouth once daily.      multivitamin capsule Take 1 capsule by mouth once daily.      naproxen (NAPROSYN) 500 MG tablet Take 1 tablet (500 mg total) by mouth 2 (two) times daily.  Qty: 90 tablet, Refills: 1    Associated Diagnoses: Physical exam      terbinafine HCL (LAMISIL) 250 mg tablet Take 250 mg by mouth once daily. TAKE 1 TABLET BY MOUTH EVERY DAY           Discharge Procedure Orders   Diet Adult Regular     Leave dressing on - Keep it clean, dry, and intact until clinic visit     Activity as tolerated        No abnormal movements

## 2023-08-25 NOTE — ED BEHAVIORAL HEALTH ASSESSMENT NOTE - AXIS III
MST triggered for UWL and decreased appetite. Patient stated UBW of 132#. Denies poor appetite. Currently on Cardiac diet with good intake. Skin intact. Weight history reviewed and weight increasing. Labs reviewed. No nutrition intervention indicated at this time. RD available via consult. Will follow per policy.     none

## 2023-09-15 NOTE — PROGRESS NOTE BEHAVIORAL HEALTH - LANGUAGE
numerical 0-10
No abnormalities noted

## 2023-12-11 NOTE — PROGRESS NOTE BEHAVIORAL HEALTH - NSBHADMITCOORDCARE_PSY_A_CORE
Problem: Adult Inpatient Plan of Care  Goal: Plan of Care Review  Outcome: Ongoing, Progressing  Flowsheets (Taken 12/5/2023 1935)  Plan of Care Reviewed With: patient  Outcome Evaluation: Patient is currently on 10L high flow NC. No complaints of pain. Patient is alert and oriented. CT of the chest obtained. Patient is ambulating to the bedside commode independently, otherwise is a stand-by assist.   Goal Outcome Evaluation:  Plan of Care Reviewed With: patient           Outcome Evaluation: Patient is currently on 10L high flow NC. No complaints of pain. Patient is alert and oriented. CT of the chest obtained. Patient is ambulating to the bedside commode independently, otherwise is a stand-by assist.         
  Problem: Adult Inpatient Plan of Care  Goal: Plan of Care Review  Outcome: Ongoing, Progressing  Flowsheets (Taken 12/6/2023 2016)  Outcome Evaluation: Patient is on 10L HFNC. Patient is alert and oriented. Echo obtained. IV fluids running at 100mL. IV antibiotics administered. Swabbed for MRSA and respiratory panel. No complaints of pain. Patient had a bowel movement this shift.   Goal Outcome Evaluation:  Plan of Care Reviewed With: patient           Outcome Evaluation: Patient is on 10L HFNC. Patient is alert and oriented. Echo obtained. IV fluids running at 100mL. IV antibiotics administered. Swabbed for MRSA and respiratory panel. No complaints of pain. Patient had a bowel movement this shift.         
  Problem: Adult Inpatient Plan of Care  Goal: Plan of Care Review  Outcome: Ongoing, Progressing  Flowsheets (Taken 12/7/2023 0506)  Plan of Care Reviewed With: patient  Outcome Evaluation: Patient is alert and oriented throughout the shift. VSS. Patient is on 10L highflow nasal cannal. No complaints of pain or discomfort. Continue with plan of care.   Goal Outcome Evaluation:  Plan of Care Reviewed With: patient           Outcome Evaluation: Patient is alert and oriented throughout the shift. VSS. Patient is on 10L highflow nasal cannal. No complaints of pain or discomfort. Continue with plan of care.         
  Problem: Adult Inpatient Plan of Care  Goal: Plan of Care Review  Outcome: Ongoing, Progressing  Flowsheets (Taken 12/8/2023 0441)  Progress: no change  Plan of Care Reviewed With: patient  Outcome Evaluation: Patient is alert and oriented throughout shift. VSS. Patient remains on 10L HFNC. Patient complained of a sore throat. Treated per MAR. Continue with plan of care.   Goal Outcome Evaluation:  Plan of Care Reviewed With: patient        Progress: no change  Outcome Evaluation: Patient is alert and oriented throughout shift. VSS. Patient remains on 10L HFNC. Patient complained of a sore throat. Treated per MAR. Continue with plan of care.         
Goal Outcome Evaluation:           Progress: no change  Outcome Evaluation: No acute changes throughout this shift, vss, patient on 10L HFNC, antibiotics infused, up to bsc with minimal assist.         
Goal Outcome Evaluation:           Progress: no change  Outcome Evaluation: Patient remains on 10L HFNC, independent to bedside commode, patient had massive nose bleed requiring pressure and afrin to be administered, nose bleed has since substained, patient cleaned up, remains in chair.         
Goal Outcome Evaluation:         Patient refused BiPAP. Patient attempted to wear BiPAP for 30 minutes tonight at 12/5 and states she cannot tolerate.               
Goal Outcome Evaluation:      Patient has not worn bipap this morning. Currently on 10L High flow nasal cannula, tolerating well.                  
Goal Outcome Evaluation:      Patient remains on 6L HFNC. Spoke with RN and agreed to hold BiPAP tonight due to patient's reoccurring nose bleeds.                  
Goal Outcome Evaluation:      Patient was hesitant to wear the bipap tonight. She was afraid it would dry her nose out and it would bleed again. I placed humidifier on the bipap. She wore it for about an hour. Placed back on 4l n/c.                  
Goal Outcome Evaluation:   Pt admitted from ED for acute resp disease due to COVID-19 virus. Precaution initiated. Pt on 7L high flow NC, A&Ox4, NSR w/BBB on monitor. No acute changes in status, VSS, POC ongoing.                      
Goal Outcome Evaluation:   Pt wore bipap for portion of night, O2 sat drops upon exertion. No complaints of pain or discomfort. Remains on 10 L high flow NC. VSS, no acute changes, POC ongoing.                      
Goal Outcome Evaluation:  Patient did not wear Bipap tonight and remained on 10L high flow nasal cannula. She stated that the night before, she could not tolerate it due to coughing and secretions.                      
Goal Outcome Evaluation:  Patient refused BIPAP last night. Previous night RT placed a heated circuit on the BIPAP and patient unable to tolerate. When I talked to patient tonight she stated that she had a BIPAP at home but did not use it. She stated that she could not tolerate this unit because she's claustrophobic. Patient on 4L NC and titrated to 3L which is her home o2.                         
Goal Outcome Evaluation:  Plan of Care Reviewed With: patient            VSS during shift. Patient being DC per MD.         
Goal Outcome Evaluation:  Plan of Care Reviewed With: patient            VSS during shift. Patient is A/O and has had no complaints of pain. Patient has been ad christelle to bsc and has been up in chair during shift. Continue plan of care.         
Goal Outcome Evaluation:  Plan of Care Reviewed With: patient           Outcome Evaluation: Patient did not wear bipap last night due to nose bleed. This morning on 6L HFNC maintaining sats in the low 90's. No complaints of shortness of breath. Bipap at bedside.         
Goal Outcome Evaluation:  Plan of Care Reviewed With: patient           Outcome Evaluation: Patient is able to complete all transfers and bed mobilities indepedently in the room. Pt is able to ambulate ad christelle in the room. Pt does not need inpatient PT services. Recommend DC home.      Anticipated Discharge Disposition (PT): home  
Goal Outcome Evaluation:  Plan of Care Reviewed With: patient         VSS during shift. Patient being DC per MD.            
Goal Outcome Evaluation:  Plan of Care Reviewed With: patient         VSS during shift. Patient is A/O, not complaining of any pain. Patient is ad christelle to bsc. Patient has not had any further nose bleeds on this shift. Continue plan of care.            
Goal Outcome Evaluation:  Plan of Care Reviewed With: patient        Progress: improving  Outcome Evaluation: Patient tolerated BIPAP 12/5 rate 16 fio2 .40 last night. Patient now resting on 7L high flow nasal cannul.         
Goal Outcome Evaluation:  Plan of Care Reviewed With: patient        Progress: no change  Outcome Evaluation: Patient continues on 10lpm nasal cannula today, with oxygen satuation of 90%. Patient has our V60 BIPAP at bedside, and it has not been used since 12/5. I will keep in room for today/tonight due to patient's high oxygen demand, in case we need to use it.         
Goal Outcome Evaluation:  Plan of Care Reviewed With: patient        Progress: no change  Outcome Evaluation: Pt only wore bipap for a short time last night. V60 is currently on standby at bedside. Pt is on a 10L High flow nasal cannula resting comfortably at this time.         
Goal Outcome Evaluation:  Plan of Care Reviewed With: patient        Progress: no change  Outcome Evaluation: patient could not tolerate bipap for more than an hour last night. She is on 3l nc her home o2 she is saying.         
Goal Outcome Evaluation: No complaints of pain. BP high once, MD contacted and meds given to lower BP. Continue plan of care.                        
Goal Outcome Evaluation:Patient refused BIPAP and is currently on 10L HFNC. No distress noted.                        
Goal Outcome Evaluation:Patient states that she can not tolerate hospital BiPap which is why she did not wear.  Patient is currently on 3lpm n/c which is her home O2 setting.                         
yes

## 2024-03-18 NOTE — PROGRESS NOTE BEHAVIORAL HEALTH - NSBHFUPVIOL_PSY_A_CORE
Detail Level: Zone Sunscreen Recommendations: I recommend a zinc or titanium based sunscreen for daily wear.  I recommend 30 spf  or greater. Detail Level: Generalized Moisturizer Recommendations: Cerave Cream Detail Level: Detailed Sunscreen Recommendations: Recommend sunscreen daily, or sun protective clothing.  I recommend a zinc or titanium based sunscreen with a spf of 30 or greater. none known

## 2024-05-18 NOTE — PROGRESS NOTE BEHAVIORAL HEALTH - AFFECT QUALITY
Virtual appointment with patient and mother.  Consent obtained for this platform and identification verified.  They were located at home.      Patient has been mostly adherent to Dexmethylphenidate ER 15 mg every morning and Fluoxetine 20 mg every evening.       After discontinuation of afternoon Dexmethylphenidate 5 mg at last appointment he states depressed mood in evening hours stopped.  However he is now describing similar sadness during the hours after he takes the morning Dexmethylphenidate ER 15 mg.       Patient and mother agree Dexmethylphenidate ER helps with ADHD along with strategies that he has learned over the years.       Academics at [employer] in [facility] good.  He will return to school this weekend and then back home mid-July.      He denies suicidal ideation.   No aggression or self-harm.     No jakub or hallucinations.      Sleeps through the night.  Appetite good, weight between 115-120 pounds.      He has been vaping nicotine daily   We reviewed risks during this appointment and patient verbalized some commitment to stop.      At home he displays defiance, especially toward mother.   We also processed this pattern in this appointment.      MSE:   Normal dress and grooming.  Thought process goal-directed.  Speech normal tone, rate, quality.   Euthymic mood, full range of affect.   No suicidal or homicidal ideation.   No agitation.   Alert and oriented X 4.  Judgment and insight fair.      Dx:  ADHD;  Unspecified Anxiety Disorder; Other Specified Depressive Episodes    Plan:    Reduce Dexmethylphenidate ER to 5 mg every morning.  Consent/assent obtained after review of alternatives as well with changing ADHD medication.   Also discussed 10 mg vs 5 mg and patient prefers 5 mg.     Rx for Dexmethylphenidate ER 5 mg #30 sent to [facility]    Update 1-2 weeks    Continue Fluoxetine 20 mg every evening.  Ongoing consent obtained.   Has sufficient supply.       Follow-up in 3 months when returns  home.     Euthymic

## 2024-11-20 NOTE — ED BEHAVIORAL HEALTH ASSESSMENT NOTE - NS ED BHA PLAN NEED FOR 1 TO 1 ON INPATIENT UNIT YES2 FT
Patient was informed of results. Patient expressed understanding. No further questions at this time.  Patient decline cholesterol medicine at this time.   danger to self

## 2025-03-24 NOTE — ED ADULT NURSE NOTE - NS ED BHA ALCOHOL
EXAM: CT LS SPINE WO CONTRAST



INDICATION: R/o fracture



COMPARISON: None



TECHNIQUE:  Multiple axial CT images of the lumbar spine were obtained using bone algorithm. Axial an
d coronal reformatting was done. Bone and soft tissue windows were reviewed.



Radiation Dose Information:



CT Dose: CTDI volume is  mGy. Dose-length product is  mGy*cm



FINDINGS: 



No CT evidence of acute fracture or traumatic mal-alignment. The visualized paraspinal soft tissues a
re grossly unremarkable.



Moderate disc space narrowing at T12-L1 There is multilevel degenerative change of the spine, with di
sc space narrowing, subchondral sclerosis, and marginal osteophyte formation.



IMPRESSION: 



1. No CT evidence of acute fracture or traumatic mal-alignment of the bony lumbar spine.



2. Moderate disc space narrowing at T12-L1



3. Radiation optimization: All CT scans at this facility use at least one of these dose optimization 
techniques: automated exposure control  mA and/or kV adjustment per patient size (includes targeted e
xams where dose is matched to clinical indication)  or iterative reconstruction. 



Electronically Signed by: Sudeep Smith at 03/24/2025 23:44:45 PM None known
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
## 2025-05-19 NOTE — PROGRESS NOTE BEHAVIORAL HEALTH - ORIENTED TO SITUATION
Addended by: YULY KELLEY on: 5/19/2025 10:48 AM     Modules accepted: Orders    
Yes
